# Patient Record
Sex: FEMALE | Race: WHITE | NOT HISPANIC OR LATINO | Employment: FULL TIME | URBAN - METROPOLITAN AREA
[De-identification: names, ages, dates, MRNs, and addresses within clinical notes are randomized per-mention and may not be internally consistent; named-entity substitution may affect disease eponyms.]

---

## 2017-01-23 ENCOUNTER — OFFICE VISIT (OUTPATIENT)
Dept: NEUROLOGY | Facility: CLINIC | Age: 42
End: 2017-01-23
Payer: COMMERCIAL

## 2017-01-23 VITALS — WEIGHT: 137.13 LBS | HEIGHT: 67 IN | BODY MASS INDEX: 21.52 KG/M2

## 2017-01-23 DIAGNOSIS — G43.109 MIGRAINE WITH AURA AND WITHOUT STATUS MIGRAINOSUS, NOT INTRACTABLE: Primary | ICD-10-CM

## 2017-01-23 PROCEDURE — 99999 PR PBB SHADOW E&M-EST. PATIENT-LVL II: CPT | Mod: PBBFAC,,, | Performed by: NEUROMUSCULOSKELETAL MEDICINE & OMM

## 2017-01-23 PROCEDURE — 99214 OFFICE O/P EST MOD 30 MIN: CPT | Mod: S$GLB,,, | Performed by: NEUROMUSCULOSKELETAL MEDICINE & OMM

## 2017-01-23 PROCEDURE — 1159F MED LIST DOCD IN RCRD: CPT | Mod: S$GLB,,, | Performed by: NEUROMUSCULOSKELETAL MEDICINE & OMM

## 2017-01-23 RX ORDER — SUMATRIPTAN SUCCINATE 100 MG/1
100 TABLET ORAL
Qty: 10 TABLET | Refills: 3 | Status: SHIPPED | OUTPATIENT
Start: 2017-01-23 | End: 2017-04-11 | Stop reason: SDUPTHER

## 2017-01-23 RX ORDER — AMITRIPTYLINE HYDROCHLORIDE 10 MG/1
10 TABLET, FILM COATED ORAL NIGHTLY PRN
Qty: 30 TABLET | Refills: 1 | Status: SHIPPED | OUTPATIENT
Start: 2017-01-23 | End: 2017-06-12 | Stop reason: SDUPTHER

## 2017-01-23 NOTE — PROGRESS NOTES
This history is dictated on [email or web address] word recognition program. There are word recognition mistakes that are occasionally missed on review.     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 She has 3 girls.     Present Medical History: Patient continues to have migraine headaches approximately one per month.  She typically takes 3-4 pills during a several day stretch of the headaches during her menstrual cycle.  Imitrex 100 mg works quite well.  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  She occasionally gets the aura but not often.  Alcohol or stress typically triggers her headaches as well as her menstrual cycle.  We discussed taking preventive medicine at this point however she does not wish to take it all month.    Previous note: 4-6-16: This is a 40-year-old white female who presents with a history of severe headache one month ago. She describes a 10/10 headache as a constant throbbing pain in the right frontal area associated with nausea and worse with sitting up. Headache was so bad he could not get up to take her kids to school. She denies any particular triggers for the headache however does note that if she has not slept properly for a couple days this seems to trigger a headache. She does occasionally have blurry vision lasting about 1 hour prior to the headache. This occurs 3-4 times per year the last of which was 3 months ago. Patient went to the ER one month ago with a bad headache. CT scan of the head was reportedly normal. Patient has normal periods but does not think the headaches are related. Patient has been taking birth control pills which she has been on for many years.  Gen. Appearance: Well-developed with no obvious deformities  Carotid arteries  symmetrical pulses  Peripheral vascular shows symmetrical pulses with no obvious edema or tenderness  Neurological Exam:  Mental status-alert and oriented to person, place, and time; attention span and concentration is good. Fund of knowledge-patient is aware of current events and able to give detailed history of the current problem.recent and remote memory seems intact. Language function is normal with no evidence of aphasia     Cranial nerves:Visual acuity to hand chart -normal; visual fields to confrontation normal;pupils were equal and reactive to light ; funduscopic examination was normal with sharp disc margins. external ocular movements were full with no nystagmus. Facial sensation to pinprick : normal ; corneal reflexes intact; Facial muscles were symmetrical. Hearing is unimpaired symmetrical finger rub; Tongue movements - normal ; palate movements - normal ;Swallowing unimpaired. Shoulder shrug was intact with good strength Speech was normal     Motor examination: Upper : normal Lower extremities - Normal and symmetrical;muscle tone was normal ; right-handed  Sensory examination:Upper; normal pinprick and soft touch ; lower extremities - normal and symmetrical. Vibration sense: normal for age :15-20 seconds @ toes  Deep tendon reflexes: upper extremities :1-2+ symmetrical ; lower extremities KJ- 1-2 +; AJ - 1-2+ Both plantar responses were flexor  Cerebellar examination upper: Normal finger to nose and rapid alternating movements  Gait: Steady with no ataxia; heel and toe walk normal  Romberg test: negative Tandem gait: Normal   Involuntary movements: Negative  Cervical examination: Full range of motion with no pain Cervical tenderness :negative  Lumbar examination: Low back tenderness-negative Sciatic notch tenderness-negative Straight leg raising test-negative     Impression:  hormonal migraines;  Recommendations/plan: 3-4 days of headaches patient is interested in preventive medicine for that period of  time.  We will start her on amitriptyline 10 mg at night to start 2 days prior to her menstrual cycle and take throughout the menstrual cycle to see if this will help prevent some of the headaches.  She will keep a headache diary.  Follow-up in 4 months

## 2017-04-11 ENCOUNTER — PATIENT MESSAGE (OUTPATIENT)
Dept: NEUROLOGY | Facility: CLINIC | Age: 42
End: 2017-04-11

## 2017-04-12 RX ORDER — SUMATRIPTAN SUCCINATE 100 MG/1
100 TABLET ORAL
Qty: 10 TABLET | Refills: 3 | Status: SHIPPED | OUTPATIENT
Start: 2017-04-12 | End: 2017-10-20 | Stop reason: ALTCHOICE

## 2017-06-12 ENCOUNTER — PATIENT MESSAGE (OUTPATIENT)
Dept: NEUROLOGY | Facility: CLINIC | Age: 42
End: 2017-06-12

## 2017-06-12 RX ORDER — AMITRIPTYLINE HYDROCHLORIDE 10 MG/1
10 TABLET, FILM COATED ORAL NIGHTLY PRN
Qty: 30 TABLET | Refills: 1 | Status: SHIPPED | OUTPATIENT
Start: 2017-06-12 | End: 2017-10-20 | Stop reason: SDUPTHER

## 2017-10-11 ENCOUNTER — PATIENT OUTREACH (OUTPATIENT)
Dept: INTERNAL MEDICINE | Facility: CLINIC | Age: 42
End: 2017-10-11

## 2017-10-11 NOTE — PROGRESS NOTES
[facility] is committed to your overall health.  To help you get the most out of each of your visits, we will review your information to make sure you are up to date on all of your recommended tests and/or procedures.       Your PCP  Tian Espinal MD   found that you may be due for:       Health Maintenance Due   Topic Date Due    TETANUS VACCINE  07/02/1994    Pap Smear with HPV Cotest  11/06/2016    Mammogram  12/09/2016    Influenza Vaccine  08/01/2017             If you have had any of the above done at another facility, please bring the records or information with you so that your record at [facility] will be complete.  If you would like to schedule any of these, please contact me.     If you are currently taking medication, please bring it with you to your appointment for review.     Also, if you have any type of Advanced Directives, please bring them with you to your office visit so we may scan them into your chart.       Thank you for choosing [facility] for your healthcare needs.

## 2017-10-20 ENCOUNTER — OFFICE VISIT (OUTPATIENT)
Dept: INTERNAL MEDICINE | Facility: CLINIC | Age: 42
End: 2017-10-20
Attending: INTERNAL MEDICINE
Payer: COMMERCIAL

## 2017-10-20 VITALS
SYSTOLIC BLOOD PRESSURE: 118 MMHG | BODY MASS INDEX: 21.97 KG/M2 | OXYGEN SATURATION: 97 % | HEIGHT: 67 IN | WEIGHT: 140 LBS | HEART RATE: 74 BPM | DIASTOLIC BLOOD PRESSURE: 80 MMHG

## 2017-10-20 DIAGNOSIS — F41.9 ANXIETY: ICD-10-CM

## 2017-10-20 DIAGNOSIS — G47.9 SLEEP DISORDER: ICD-10-CM

## 2017-10-20 DIAGNOSIS — Z00.00 ANNUAL PHYSICAL EXAM: Primary | ICD-10-CM

## 2017-10-20 DIAGNOSIS — Z12.4 CERVICAL CANCER SCREENING: ICD-10-CM

## 2017-10-20 PROCEDURE — 90686 IIV4 VACC NO PRSV 0.5 ML IM: CPT | Mod: S$GLB,,, | Performed by: INTERNAL MEDICINE

## 2017-10-20 PROCEDURE — 90471 IMMUNIZATION ADMIN: CPT | Mod: S$GLB,,, | Performed by: INTERNAL MEDICINE

## 2017-10-20 PROCEDURE — 99386 PREV VISIT NEW AGE 40-64: CPT | Mod: 25,S$GLB,, | Performed by: INTERNAL MEDICINE

## 2017-10-20 PROCEDURE — 99999 PR PBB SHADOW E&M-EST. PATIENT-LVL IV: CPT | Mod: PBBFAC,,, | Performed by: INTERNAL MEDICINE

## 2017-10-20 RX ORDER — SUMATRIPTAN SUCCINATE 100 MG/1
TABLET ORAL
COMMUNITY
Start: 2017-08-03 | End: 2017-11-28 | Stop reason: SDUPTHER

## 2017-10-20 RX ORDER — AMITRIPTYLINE HYDROCHLORIDE 25 MG/1
25 TABLET, FILM COATED ORAL NIGHTLY PRN
Qty: 30 TABLET | Refills: 1 | Status: SHIPPED | OUTPATIENT
Start: 2017-10-20 | End: 2017-12-22 | Stop reason: SDUPTHER

## 2017-10-20 NOTE — PROGRESS NOTES
"Patient was given vaccine information sheet for the Flu Vaccine. The area of injection was palpated using the acromion process as a landmark. This area was cleaned with alcohol. Using a 25g 1" safety needle, 0.5mL of the vaccine was placed into the left deltoid muscle. The injection site was dressed with a bandage. Patient experienced no complications and was discharged in stable condition. Fluzone vaccine Lot: [id number] Exp: [id number]    "

## 2017-10-20 NOTE — PROGRESS NOTES
Subjective:   Patient ID: Lu Lopez is a 42 y.o. female  Chief complaint:   Chief Complaint   Patient presents with    [facility]       HPI   Pt here to est care and for annual exam.   + feeling overwhelmed over past couple of months. + fatigue. In racing thoughts and worrying. Has 3 daughters - this time of year is typically tough for her due to anniversary of her 2nd daughter's dx of DM1. Eldest daughter is senior and she is applying for colleges. This is happy time but also emotional as oldest daughter is moving away to college.   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   + trouble falling asleep and staying asleep at times - does watch tv before bed.   Exercising 2-3 days per week   Works from home  Denies depression - mood is good, good support from   Was on lexapro after daughter initially dx with DM1 many years ago - pt stopped as did not like way it made her feel - felt flat  No fevers or chills, Cough or burning with uriantion    Gyn: Dr. Estes - mmg ordered through gyn at [facility] - due for pap next month    Has hx of migraines - followed by neuro - bianca elavil which she takes prior to cycle - 10mg not always helpful with sleep - bianca med well. Ha stable    Review of Systems   Constitutional: Negative for activity change, chills, fatigue and fever.   Eyes: Negative for discharge.   Respiratory: Negative for chest tightness, shortness of breath and wheezing.    Cardiovascular: Negative for chest pain and leg swelling.   Gastrointestinal: Negative for constipation, diarrhea and vomiting.   Genitourinary: Negative for difficulty urinating and hematuria.   Musculoskeletal: Negative for arthralgias and joint swelling.   Skin: Negative for color change and rash.   Neurological: Positive for headaches.   Psychiatric/Behavioral: Positive for decreased concentration and sleep disturbance. Negative for dysphoric mood, self-injury and suicidal ideas. The patient is  "nervous/anxious.      Objective:  Vitals:    10/20/17 1530   BP: 118/80   Pulse: 74   SpO2: 97%   Weight: 63.5 kg (139 lb 15.9 oz)   Height: 5' 7" (1.702 m)     Body mass index is 21.93 kg/m².    Physical Exam   Constitutional: She is oriented to person, place, and time. She appears well-developed and well-nourished.   HENT:   Head: Normocephalic and atraumatic.   Right Ear: External ear normal.   Left Ear: External ear normal.   Nose: Nose normal.   Mouth/Throat: Oropharynx is clear and moist. No oropharyngeal exudate.   No carotid bruits   Eyes: Conjunctivae and EOM are normal.   Neck: Neck supple. No thyromegaly present.   Cardiovascular: Normal rate, regular rhythm, normal heart sounds and intact distal pulses.    Pulmonary/Chest: Effort normal and breath sounds normal.   Abdominal: Soft. Bowel sounds are normal.   Musculoskeletal: She exhibits no edema or tenderness.   Lymphadenopathy:     She has no cervical adenopathy.   Neurological: She is alert and oriented to person, place, and time.   Skin: Skin is warm and dry. Capillary refill takes less than 2 seconds.   Psychiatric: Her behavior is normal. Thought content normal.   Vitals reviewed.    Assessment:  1. Annual physical exam    2. Cervical cancer screening    3. Sleep disorder    4. Anxiety        Plan:  Lu was seen today for establish care.    Diagnoses and all orders for this visit:    Annual physical exam  -     Influenza - Quadrivalent (3 years & older) (PF)  -     TSH; Future  -     Lipid panel; Future  -     CBC auto differential; Future  -     Comprehensive metabolic panel; Future  -     Hemoglobin A1c; Future  Recommend daily sunscreen, cardiovascular exercise min 30 min 5 days per week. Seatbelts routinely.    Sleep disorder: counseled on good sleep hygiene - otc meds discussed - trial of otc benadryl or elavil since bianca this. Will give higher dose of elavil 25mg to see if helpful for sleep as well as migraines (she is currently taking 10mg " near menstrual cycle for migraine prevention)    Anxiety: discussed rx, counseling, exercise - for now will try to improve sleep habits - see above. If no improvement she will consider trial of zoloft and let me know if decides to pursue this as well as counseling    Other orders  -     amitriptyline (ELAVIL) 25 MG tablet; Take 1 tablet (25 mg total) by mouth nightly as needed for Insomnia.      Health Maintenance   Topic Date Due    TETANUS VACCINE  07/02/1994    Pap Smear with HPV Cotest  11/06/2016    Mammogram  01/11/2019    Influenza Vaccine  Completed    Lipid Panel  Completed

## 2017-10-21 PROBLEM — F41.9 ANXIETY: Status: ACTIVE | Noted: 2017-10-21

## 2017-10-21 PROBLEM — G47.9 SLEEP DISORDER: Status: ACTIVE | Noted: 2017-10-21

## 2017-10-24 ENCOUNTER — LAB VISIT (OUTPATIENT)
Dept: LAB | Facility: OTHER | Age: 42
End: 2017-10-24
Attending: INTERNAL MEDICINE
Payer: COMMERCIAL

## 2017-10-24 DIAGNOSIS — Z00.00 ANNUAL PHYSICAL EXAM: ICD-10-CM

## 2017-10-24 LAB
ALBUMIN SERPL BCP-MCNC: 3.8 G/DL
ALP SERPL-CCNC: 35 U/L
ALT SERPL W/O P-5'-P-CCNC: 10 U/L
ANION GAP SERPL CALC-SCNC: 9 MMOL/L
AST SERPL-CCNC: 14 U/L
BASOPHILS # BLD AUTO: 0.01 K/UL
BASOPHILS NFR BLD: 0.2 %
BILIRUB SERPL-MCNC: 0.3 MG/DL
BUN SERPL-MCNC: 12 MG/DL
CALCIUM SERPL-MCNC: 9.1 MG/DL
CHLORIDE SERPL-SCNC: 105 MMOL/L
CHOLEST SERPL-MCNC: 183 MG/DL
CHOLEST/HDLC SERPL: 3.1 {RATIO}
CO2 SERPL-SCNC: 25 MMOL/L
CREAT SERPL-MCNC: 0.8 MG/DL
DIFFERENTIAL METHOD: NORMAL
EOSINOPHIL # BLD AUTO: 0.1 K/UL
EOSINOPHIL NFR BLD: 2 %
ERYTHROCYTE [DISTWIDTH] IN BLOOD BY AUTOMATED COUNT: 13.2 %
EST. GFR  (AFRICAN AMERICAN): >60 ML/MIN/1.73 M^2
EST. GFR  (NON AFRICAN AMERICAN): >60 ML/MIN/1.73 M^2
ESTIMATED AVG GLUCOSE: 97 MG/DL
GLUCOSE SERPL-MCNC: 91 MG/DL
HBA1C MFR BLD HPLC: 5 %
HCT VFR BLD AUTO: 37.5 %
HDLC SERPL-MCNC: 60 MG/DL
HDLC SERPL: 32.8 %
HGB BLD-MCNC: 12.3 G/DL
LDLC SERPL CALC-MCNC: 95.6 MG/DL
LYMPHOCYTES # BLD AUTO: 1.5 K/UL
LYMPHOCYTES NFR BLD: 31.8 %
MCH RBC QN AUTO: 27.9 PG
MCHC RBC AUTO-ENTMCNC: 32.8 G/DL
MCV RBC AUTO: 85 FL
MONOCYTES # BLD AUTO: 0.4 K/UL
MONOCYTES NFR BLD: 8.9 %
NEUTROPHILS # BLD AUTO: 2.6 K/UL
NEUTROPHILS NFR BLD: 56.9 %
NONHDLC SERPL-MCNC: 123 MG/DL
PLATELET # BLD AUTO: 287 K/UL
PMV BLD AUTO: 11.5 FL
POTASSIUM SERPL-SCNC: 3.8 MMOL/L
PROT SERPL-MCNC: 7.6 G/DL
RBC # BLD AUTO: 4.41 M/UL
SODIUM SERPL-SCNC: 139 MMOL/L
TRIGL SERPL-MCNC: 137 MG/DL
TSH SERPL DL<=0.005 MIU/L-ACNC: 1.43 UIU/ML
WBC # BLD AUTO: 4.59 K/UL

## 2017-10-24 PROCEDURE — 36415 COLL VENOUS BLD VENIPUNCTURE: CPT

## 2017-10-24 PROCEDURE — 80053 COMPREHEN METABOLIC PANEL: CPT

## 2017-10-24 PROCEDURE — 85025 COMPLETE CBC W/AUTO DIFF WBC: CPT

## 2017-10-24 PROCEDURE — 84443 ASSAY THYROID STIM HORMONE: CPT

## 2017-10-24 PROCEDURE — 83036 HEMOGLOBIN GLYCOSYLATED A1C: CPT

## 2017-10-24 PROCEDURE — 80061 LIPID PANEL: CPT

## 2017-11-08 ENCOUNTER — OFFICE VISIT (OUTPATIENT)
Dept: NEUROLOGY | Facility: CLINIC | Age: 42
End: 2017-11-08
Payer: COMMERCIAL

## 2017-11-08 VITALS
BODY MASS INDEX: 22.07 KG/M2 | DIASTOLIC BLOOD PRESSURE: 91 MMHG | WEIGHT: 140.63 LBS | HEIGHT: 67 IN | SYSTOLIC BLOOD PRESSURE: 138 MMHG | HEART RATE: 71 BPM

## 2017-11-08 DIAGNOSIS — G43.109 MIGRAINE WITH AURA AND WITHOUT STATUS MIGRAINOSUS, NOT INTRACTABLE: Primary | ICD-10-CM

## 2017-11-08 DIAGNOSIS — F41.9 ANXIETY: ICD-10-CM

## 2017-11-08 PROCEDURE — 99214 OFFICE O/P EST MOD 30 MIN: CPT | Mod: S$GLB,,, | Performed by: NEUROMUSCULOSKELETAL MEDICINE & OMM

## 2017-11-08 PROCEDURE — 99999 PR PBB SHADOW E&M-EST. PATIENT-LVL III: CPT | Mod: PBBFAC,,, | Performed by: NEUROMUSCULOSKELETAL MEDICINE & OMM

## 2017-11-10 NOTE — PROGRESS NOTES
My Last Outpatient Progress Note    Signed  Encounter Date: 1/23/2017 8:20 AM   This history is dictated on [employer] Speaking word recognition program. There are word recognition mistakes that are occasionally missed on review.    Social History : Patient works for [employer]; out of her house. She has 3 girls.  She has one girl with type 1 diabetes     Present Medical History: Patient presents for follow-up for her migraines.  She has been using to-3 Imitrex per month however in the last month she has had 5-6 headaches during her period.  She wakes up the next day with headaches which have lasted more than 24 hours.  She feels flushed and groggy with the headaches.  Imitrex typically works within one-2 hours.  She was recently increased to amitriptyline 25 mg at night which seems to be helping her sleep.  She has been under a lot of stress with a big  for the [employer] coming up which she is responsible for.    Previous note: 1-23-17: Patient continues to have migraine headaches approximately one per month.  She typically takes 3-4 pills during a several day stretch of the headaches during her menstrual cycle.  Imitrex 100 mg works quite well.  She did have a bad month in November where she was having 6-8 headaches under a lot of stress prior to Christmas with fund raising for her [email or web address] that she works for.  She occasionally gets the aura but not often.  Alcohol or stress typically triggers her headaches as well as her menstrual cycle.  We discussed taking preventive medicine at this point however she does not wish to take it all month.     Previous note: 4-6-16: This is a 40-year-old white female who presents with a history of severe headache one month ago. She describes a 10/10 headache as a constant throbbing pain in the right frontal area associated with nausea and worse with sitting up. Headache was so bad he could not get up to take her  kids to school. She denies any particular triggers for the headache however does note that if she has not slept properly for a couple days this seems to trigger a headache. She does occasionally have blurry vision lasting about 1 hour prior to the headache. This occurs 3-4 times per year the last of which was 3 months ago. Patient went to the ER one month ago with a bad headache. CT scan of the head was reportedly normal. Patient has normal periods but does not think the headaches are related. Patient has been taking birth control pills which she has been on for many years.  Gen. Appearance: Well-developed with no obvious deformities  Carotid arteries symmetrical pulses  Peripheral vascular shows symmetrical pulses with no obvious edema or tenderness  Neurological Exam:  Mental status-alert and oriented to person, place, and time; attention span and concentration is good. Fund of knowledge-patient is aware of current events and able to give detailed history of the current problem.recent and remote memory seems intact. Language function is normal with no evidence of aphasia     Cranial nerves:Visual acuity to hand chart -normal; visual fields to confrontation normal;pupils were equal and reactive to light ; funduscopic examination was normal with sharp disc margins. external ocular movements were full with no nystagmus. Facial sensation to pinprick : normal ; corneal reflexes intact; Facial muscles were symmetrical. Hearing is unimpaired symmetrical finger rub; Tongue movements - normal ; palate movements - normal ;Swallowing unimpaired. Shoulder shrug was intact with good strength Speech was normal     Motor examination: Upper : normal Lower extremities - Normal and symmetrical;muscle tone was normal ; right-handed  Sensory examination:Upper; normal pinprick and soft touch ; lower extremities - normal and symmetrical. Vibration sense: normal for age :15-20 seconds @ toes  Deep tendon reflexes: upper extremities  :1-2+ symmetrical ; lower extremities KJ- 1-2 +; AJ - 1-2+ Both plantar responses were flexor  Cerebellar examination upper: Normal finger to nose and rapid alternating movements  Gait: Steady with no ataxia; heel and toe walk normal  Romberg test: negative Tandem gait: Normal   Involuntary movements: Negative  Cervical examination: Full range of motion with no pain Cervical tenderness :negative  Lumbar examination: Low back tenderness-negative Sciatic notch tenderness-negative Straight leg raising test-negative     Impression:  hormonal migraines; anxiety disorder  Recommendations/plan: Continue amitriptyline 25 mg at bedtime; continue Imitrex when necessary for the migraines.   Follow-up in 6 months

## 2017-11-25 ENCOUNTER — PATIENT MESSAGE (OUTPATIENT)
Dept: NEUROLOGY | Facility: CLINIC | Age: 42
End: 2017-11-25

## 2017-11-27 ENCOUNTER — PATIENT MESSAGE (OUTPATIENT)
Dept: NEUROLOGY | Facility: CLINIC | Age: 42
End: 2017-11-27

## 2017-11-28 RX ORDER — SUMATRIPTAN SUCCINATE 100 MG/1
100 TABLET ORAL
Qty: 9 TABLET | Refills: 3 | Status: SHIPPED | OUTPATIENT
Start: 2017-11-28 | End: 2017-12-27 | Stop reason: SDUPTHER

## 2017-12-22 RX ORDER — AMITRIPTYLINE HYDROCHLORIDE 25 MG/1
TABLET, FILM COATED ORAL
Qty: 90 TABLET | Refills: 3 | Status: SHIPPED | OUTPATIENT
Start: 2017-12-22 | End: 2019-01-22 | Stop reason: SDUPTHER

## 2017-12-26 RX ORDER — ONDANSETRON 4 MG/1
TABLET, ORALLY DISINTEGRATING ORAL
Qty: 30 TABLET | Refills: 0 | Status: SHIPPED | OUTPATIENT
Start: 2017-12-26 | End: 2018-03-28 | Stop reason: SDUPTHER

## 2017-12-27 RX ORDER — SUMATRIPTAN SUCCINATE 100 MG/1
100 TABLET ORAL
Qty: 9 TABLET | Refills: 3 | Status: SHIPPED | OUTPATIENT
Start: 2017-12-27 | End: 2019-01-02 | Stop reason: SDUPTHER

## 2018-03-28 RX ORDER — ONDANSETRON 4 MG/1
TABLET, ORALLY DISINTEGRATING ORAL
Qty: 30 TABLET | Refills: 0 | Status: SHIPPED | OUTPATIENT
Start: 2018-03-28 | End: 2019-01-02 | Stop reason: SDUPTHER

## 2018-10-15 RX ORDER — SUMATRIPTAN SUCCINATE 100 MG/1
TABLET ORAL
Qty: 9 TABLET | Refills: 0 | Status: SHIPPED | OUTPATIENT
Start: 2018-10-15 | End: 2019-01-22 | Stop reason: SDUPTHER

## 2018-12-19 ENCOUNTER — PATIENT OUTREACH (OUTPATIENT)
Dept: ADMINISTRATIVE | Facility: HOSPITAL | Age: 43
End: 2018-12-19

## 2019-01-02 ENCOUNTER — OFFICE VISIT (OUTPATIENT)
Dept: INTERNAL MEDICINE | Facility: CLINIC | Age: 44
End: 2019-01-02
Attending: INTERNAL MEDICINE
Payer: COMMERCIAL

## 2019-01-02 ENCOUNTER — LAB VISIT (OUTPATIENT)
Dept: LAB | Facility: OTHER | Age: 44
End: 2019-01-02
Attending: INTERNAL MEDICINE
Payer: COMMERCIAL

## 2019-01-02 VITALS
HEIGHT: 67 IN | HEART RATE: 66 BPM | BODY MASS INDEX: 22.91 KG/M2 | OXYGEN SATURATION: 97 % | SYSTOLIC BLOOD PRESSURE: 120 MMHG | WEIGHT: 145.94 LBS | DIASTOLIC BLOOD PRESSURE: 82 MMHG

## 2019-01-02 DIAGNOSIS — Z00.00 ANNUAL PHYSICAL EXAM: Primary | ICD-10-CM

## 2019-01-02 DIAGNOSIS — G47.9 SLEEP DISORDER: ICD-10-CM

## 2019-01-02 DIAGNOSIS — G43.109 MIGRAINE WITH AURA AND WITHOUT STATUS MIGRAINOSUS, NOT INTRACTABLE: ICD-10-CM

## 2019-01-02 DIAGNOSIS — Z00.00 ANNUAL PHYSICAL EXAM: ICD-10-CM

## 2019-01-02 DIAGNOSIS — Z23 NEED FOR TDAP VACCINATION: ICD-10-CM

## 2019-01-02 DIAGNOSIS — Z23 NEED FOR INFLUENZA VACCINATION: ICD-10-CM

## 2019-01-02 LAB
25(OH)D3+25(OH)D2 SERPL-MCNC: 39 NG/ML
ALBUMIN SERPL BCP-MCNC: 4.1 G/DL
ALP SERPL-CCNC: 38 U/L
ALT SERPL W/O P-5'-P-CCNC: 11 U/L
ANION GAP SERPL CALC-SCNC: 8 MMOL/L
AST SERPL-CCNC: 16 U/L
BASOPHILS # BLD AUTO: 0.05 K/UL
BASOPHILS NFR BLD: 0.7 %
BILIRUB SERPL-MCNC: 0.4 MG/DL
BUN SERPL-MCNC: 16 MG/DL
CALCIUM SERPL-MCNC: 9.7 MG/DL
CHLORIDE SERPL-SCNC: 103 MMOL/L
CO2 SERPL-SCNC: 28 MMOL/L
CREAT SERPL-MCNC: 0.8 MG/DL
DIFFERENTIAL METHOD: NORMAL
EOSINOPHIL # BLD AUTO: 0.2 K/UL
EOSINOPHIL NFR BLD: 2.4 %
ERYTHROCYTE [DISTWIDTH] IN BLOOD BY AUTOMATED COUNT: 13.5 %
EST. GFR  (AFRICAN AMERICAN): >60 ML/MIN/1.73 M^2
EST. GFR  (NON AFRICAN AMERICAN): >60 ML/MIN/1.73 M^2
ESTIMATED AVG GLUCOSE: 105 MG/DL
GLUCOSE SERPL-MCNC: 95 MG/DL
HBA1C MFR BLD HPLC: 5.3 %
HCT VFR BLD AUTO: 38.7 %
HGB BLD-MCNC: 12.8 G/DL
LYMPHOCYTES # BLD AUTO: 1.8 K/UL
LYMPHOCYTES NFR BLD: 26.9 %
MCH RBC QN AUTO: 27.6 PG
MCHC RBC AUTO-ENTMCNC: 33.1 G/DL
MCV RBC AUTO: 84 FL
MONOCYTES # BLD AUTO: 0.5 K/UL
MONOCYTES NFR BLD: 7.8 %
NEUTROPHILS # BLD AUTO: 4.2 K/UL
NEUTROPHILS NFR BLD: 62.1 %
PLATELET # BLD AUTO: 342 K/UL
PMV BLD AUTO: 10.5 FL
POTASSIUM SERPL-SCNC: 3.7 MMOL/L
PROT SERPL-MCNC: 7.9 G/DL
RBC # BLD AUTO: 4.63 M/UL
SODIUM SERPL-SCNC: 139 MMOL/L
TSH SERPL DL<=0.005 MIU/L-ACNC: 1.2 UIU/ML
WBC # BLD AUTO: 6.8 K/UL

## 2019-01-02 PROCEDURE — 99214 PR OFFICE/OUTPT VISIT, EST, LEVL IV, 30-39 MIN: ICD-10-PCS | Mod: 25,S$GLB,, | Performed by: INTERNAL MEDICINE

## 2019-01-02 PROCEDURE — 90472 TDAP VACCINE GREATER THAN OR EQUAL TO 7YO IM: ICD-10-PCS | Mod: S$GLB,,, | Performed by: INTERNAL MEDICINE

## 2019-01-02 PROCEDURE — 90472 IMMUNIZATION ADMIN EACH ADD: CPT | Mod: S$GLB,,, | Performed by: INTERNAL MEDICINE

## 2019-01-02 PROCEDURE — 90715 TDAP VACCINE 7 YRS/> IM: CPT | Mod: S$GLB,,, | Performed by: INTERNAL MEDICINE

## 2019-01-02 PROCEDURE — 85025 COMPLETE CBC W/AUTO DIFF WBC: CPT

## 2019-01-02 PROCEDURE — 36415 COLL VENOUS BLD VENIPUNCTURE: CPT

## 2019-01-02 PROCEDURE — 90471 IMMUNIZATION ADMIN: CPT | Mod: S$GLB,,, | Performed by: INTERNAL MEDICINE

## 2019-01-02 PROCEDURE — 99999 PR PBB SHADOW E&M-EST. PATIENT-LVL IV: CPT | Mod: PBBFAC,,, | Performed by: INTERNAL MEDICINE

## 2019-01-02 PROCEDURE — 90686 IIV4 VACC NO PRSV 0.5 ML IM: CPT | Mod: S$GLB,,, | Performed by: INTERNAL MEDICINE

## 2019-01-02 PROCEDURE — 82306 VITAMIN D 25 HYDROXY: CPT

## 2019-01-02 PROCEDURE — 84443 ASSAY THYROID STIM HORMONE: CPT

## 2019-01-02 PROCEDURE — 83036 HEMOGLOBIN GLYCOSYLATED A1C: CPT

## 2019-01-02 PROCEDURE — 99214 OFFICE O/P EST MOD 30 MIN: CPT | Mod: 25,S$GLB,, | Performed by: INTERNAL MEDICINE

## 2019-01-02 PROCEDURE — 80053 COMPREHEN METABOLIC PANEL: CPT

## 2019-01-02 PROCEDURE — 90471 FLU VACCINE (QUAD) GREATER THAN OR EQUAL TO 3YO PRESERVATIVE FREE IM: ICD-10-PCS | Mod: S$GLB,,, | Performed by: INTERNAL MEDICINE

## 2019-01-02 PROCEDURE — 99999 PR PBB SHADOW E&M-EST. PATIENT-LVL IV: ICD-10-PCS | Mod: PBBFAC,,, | Performed by: INTERNAL MEDICINE

## 2019-01-02 PROCEDURE — 90715 TDAP VACCINE GREATER THAN OR EQUAL TO 7YO IM: ICD-10-PCS | Mod: S$GLB,,, | Performed by: INTERNAL MEDICINE

## 2019-01-02 PROCEDURE — 90686 FLU VACCINE (QUAD) GREATER THAN OR EQUAL TO 3YO PRESERVATIVE FREE IM: ICD-10-PCS | Mod: S$GLB,,, | Performed by: INTERNAL MEDICINE

## 2019-01-02 RX ORDER — ONDANSETRON 4 MG/1
TABLET, ORALLY DISINTEGRATING ORAL
Qty: 30 TABLET | Refills: 0 | Status: SHIPPED | OUTPATIENT
Start: 2019-01-02 | End: 2020-01-10 | Stop reason: SDUPTHER

## 2019-01-02 NOTE — PROGRESS NOTES
"Patient was given vaccine information sheet for the Tdap immunization. The area of injection was palpated using the acromion process as a landmark. This area was cleaned with alcohol. Using a 25g 1" safety needle, 0.5mL of the vaccine was placed into the left detoid muscle. The injection site was dressed with a bandage. Patient experienced no complications and was discharged in stable condition. Tdap Lot: [id number] Exp: [id number]      Patient was given vaccine information sheet for the Flu Vaccine. The area of injection was palpated using the acromion process as a landmark. This area was cleaned with alcohol. Using a 25g 1" safety needle, 0.5mL of the vaccine was placed into the right muscle. The injection site was dressed with a bandage. Patient experienced no complications and was discharged in stable condition. Fluzone vaccine Lot: [id number] Exp: [id number]      "

## 2019-01-02 NOTE — PROGRESS NOTES
"Subjective:   Patient ID: Lu Lopez is a 43 y.o. female  Chief complaint:   Chief Complaint   Patient presents with    Annual Exam       HPI   Here for annual exam     Migraines: stable - followed by neuro - taking 25mg elavil nightly and triptan prn   - taking zofran prn migraines    Stress at home -   Oldest child in college   - Sleep disorder: attrib to stress - 25mg of elavil is effective for this     Gyn: Dr. Estes - mmg ordered through gyn at [facility] license of [facility] for mmg in March    Exercising - last was 2 months    Review of Systems    Objective:  Vitals:    01/02/19 0756   BP: 120/82   BP Location: Left arm   Patient Position: Sitting   BP Method: Medium (Manual)   Pulse: 66   SpO2: 97%   Weight: 66.2 kg (145 lb 15.1 oz)   Height: 5' 7" (1.702 m)     Body mass index is 22.86 kg/m².    Physical Exam   Constitutional: She is oriented to person, place, and time. She appears well-developed and well-nourished.   HENT:   Head: Normocephalic and atraumatic.   Right Ear: External ear normal.   Left Ear: External ear normal.   Nose: Nose normal.   Mouth/Throat: Oropharynx is clear and moist. No oropharyngeal exudate.   No carotid bruits   Eyes: Conjunctivae and EOM are normal.   Neck: Neck supple. No thyromegaly present.   Cardiovascular: Normal rate, regular rhythm, normal heart sounds and intact distal pulses.   Pulmonary/Chest: Effort normal and breath sounds normal.   Abdominal: Soft. Bowel sounds are normal.   Musculoskeletal: She exhibits no edema or tenderness.   Lymphadenopathy:     She has no cervical adenopathy.   Neurological: She is alert and oriented to person, place, and time.   Skin: Skin is warm and dry.   Psychiatric: Her behavior is normal. Thought content normal.   Vitals reviewed.      Assessment:  1. Annual physical exam    2. Need for Tdap vaccination    3. Need for influenza vaccination    4. Migraine with aura and without status migrainosus, not intractable    5. Sleep disorder  "       Plan:  Lu was seen today for annual exam.    Diagnoses and all orders for this visit:    Annual physical exam  -     CBC auto differential; Future  -     Comprehensive metabolic panel; Future  -     TSH; Future  -     Vitamin D; Future  -     Hemoglobin A1c; Future  Recommend daily sunscreen, cardiovascular exercise min 30 min 5 days per week. Seatbelts routinely.    Need for Tdap vaccination  -     Tdap Vaccine; Future    Need for influenza vaccination  -     Influenza - Quadrivalent (3 years & older) (PF)    Migraines: current rx effective - HA stable   Prn med refill given today   Followed by neuro  -     ondansetron (ZOFRAN-ODT) 4 MG TbDL; DISSOLVE 2 TABLETS(8 MG) ON THE TONGUE TWICE DAILY AS NEEDED    Sleep disorder: attrib to stress - 25mg of elavil is effective for this - graded exercise program, good sleep hygiene     Health Maintenance   Topic Date Due    TETANUS VACCINE  07/02/1994    Influenza Vaccine  08/01/2018    Mammogram  03/21/2020    Pap Smear with HPV Cotest  11/15/2020    Lipid Panel  Completed

## 2019-01-17 RX ORDER — SUMATRIPTAN SUCCINATE 100 MG/1
TABLET ORAL
Qty: 9 TABLET | Refills: 0 | OUTPATIENT
Start: 2019-01-17

## 2019-01-22 ENCOUNTER — OFFICE VISIT (OUTPATIENT)
Dept: NEUROLOGY | Facility: CLINIC | Age: 44
End: 2019-01-22
Payer: COMMERCIAL

## 2019-01-22 VITALS
WEIGHT: 146.63 LBS | BODY MASS INDEX: 23.01 KG/M2 | DIASTOLIC BLOOD PRESSURE: 90 MMHG | SYSTOLIC BLOOD PRESSURE: 131 MMHG | HEIGHT: 67 IN | HEART RATE: 89 BPM

## 2019-01-22 DIAGNOSIS — G43.109 MIGRAINE WITH AURA AND WITHOUT STATUS MIGRAINOSUS, NOT INTRACTABLE: Primary | ICD-10-CM

## 2019-01-22 PROCEDURE — 99214 OFFICE O/P EST MOD 30 MIN: CPT | Mod: S$GLB,,, | Performed by: NEUROMUSCULOSKELETAL MEDICINE & OMM

## 2019-01-22 PROCEDURE — 99214 PR OFFICE/OUTPT VISIT, EST, LEVL IV, 30-39 MIN: ICD-10-PCS | Mod: S$GLB,,, | Performed by: NEUROMUSCULOSKELETAL MEDICINE & OMM

## 2019-01-22 PROCEDURE — 99999 PR PBB SHADOW E&M-EST. PATIENT-LVL III: CPT | Mod: PBBFAC,,, | Performed by: NEUROMUSCULOSKELETAL MEDICINE & OMM

## 2019-01-22 PROCEDURE — 99999 PR PBB SHADOW E&M-EST. PATIENT-LVL III: ICD-10-PCS | Mod: PBBFAC,,, | Performed by: NEUROMUSCULOSKELETAL MEDICINE & OMM

## 2019-01-22 RX ORDER — AMITRIPTYLINE HYDROCHLORIDE 25 MG/1
TABLET, FILM COATED ORAL
Qty: 90 TABLET | Refills: 3 | Status: SHIPPED | OUTPATIENT
Start: 2019-01-22 | End: 2020-01-28

## 2019-01-22 RX ORDER — SUMATRIPTAN SUCCINATE 100 MG/1
TABLET ORAL
Qty: 9 TABLET | Refills: 5 | Status: SHIPPED | OUTPATIENT
Start: 2019-01-22 | End: 2019-08-03 | Stop reason: SDUPTHER

## 2019-01-22 NOTE — PROGRESS NOTES
This history is dictated on [employer] word recognition program. There are word recognition mistakes that are occasionally missed on review.     Social History : Patient works for [employer]; out of her house. She has 3 girls.  She has one girl with type 1 diabetes     Present Medical History:  The patient presents for follow-up for migraine headaches.  She typically gets 2-3 headaches per month now.  Imitrex works 98% of the time within less than 2 hr.  She occasionally has to take a 2nd pill.  She has had rainbow alteration of her vision on several occasions throughout the year preceding the headache.    Previous patient:  11-8-17:   Patient presents for follow-up for her migraines.  She has been using 2-3 Imitrex per month however in the last month she has had 5-6 headaches during her period.  She wakes up the next day with headaches which have lasted more than 24 hours.  She feels flushed and groggy with the headaches.  Imitrex typically works within one-2 hours.  She was recently increased to amitriptyline 25 mg at night which seems to be helping her sleep.  She has been under a lot of stress with a big  for the diabetes [employer] coming up which she is responsible for.     Previous note: 1-23-17: Patient continues to have migraine headaches approximately one per month.  She typically takes 3-4 pills during a several day stretch of the headaches during her menstrual cycle.  Imitrex 100 mg works quite well.  She did have a bad month in November where she was having 6-8 headaches under a lot of stress prior to Christmas with fund raising for her [employer] that she works for.  She occasionally gets the aura but not often.  Alcohol or stress typically triggers her headaches as well as her menstrual cycle.  We discussed taking preventive medicine at this point however she does not wish to take it all month.       Gen. Appearance: Well-developed with  no obvious deformities  Carotid arteries symmetrical pulses  Peripheral vascular shows symmetrical pulses with no obvious edema or tenderness  Neurological Exam:  Mental status-alert and oriented to person, place, and time; attention span and concentration is good. Fund of knowledge-patient is aware of current events and able to give detailed history of the current problem.recent and remote memory seems intact. Language function is normal with no evidence of aphasia     Cranial nerves:Visual acuity to hand chart -normal; visual fields to confrontation normal;pupils were equal and reactive to light ; funduscopic examination was normal with sharp disc margins. external ocular movements were full with no nystagmus. Facial sensation to pinprick : normal ; corneal reflexes intact; Facial muscles were symmetrical. Hearing is unimpaired symmetrical finger rub; Tongue movements - normal ; palate movements - normal ;Swallowing unimpaired. Shoulder shrug was intact with good strength Speech was normal     Motor examination: Upper : normal Lower extremities - Normal and symmetrical;muscle tone was normal ; right-handed  Sensory examination:Upper; normal pinprick and soft touch ; lower extremities - normal and symmetrical. Vibration sense: normal for age :15-20 seconds @ toes  Deep tendon reflexes: upper extremities :1-2+ symmetrical ; lower extremities KJ- 1-2 +; AJ - 1-2+ Both plantar responses were flexor  Cerebellar examination upper: Normal finger to nose and rapid alternating movements  Gait: Steady with no ataxia; heel and toe walk normal  Romberg test: negative Tandem gait: Normal   Involuntary movements: Negative  Cervical examination: Full range of motion with no pain Cervical tenderness :negative  Lumbar examination: Low back tenderness-negative Sciatic notch tenderness-negative Straight leg raising test-negative     Impression:  hormonal migraines; anxiety disorder  Recommendations/plan:  we discussed the vision  alteration is probably an aura.  Patient can take the Imitrex at the time of the aura in attempt to prevent the headache from coming on completely.  Continue amitriptyline 25 mg at bedtime; continue Imitrex 100 mg she when necessary for the migraines.   Follow-up in 4months

## 2019-08-06 RX ORDER — SUMATRIPTAN SUCCINATE 100 MG/1
TABLET ORAL
Qty: 9 TABLET | Refills: 0 | Status: SHIPPED | OUTPATIENT
Start: 2019-08-06 | End: 2019-10-25 | Stop reason: SDUPTHER

## 2019-10-29 RX ORDER — SUMATRIPTAN SUCCINATE 100 MG/1
TABLET ORAL
Qty: 9 TABLET | Refills: 3 | Status: SHIPPED | OUTPATIENT
Start: 2019-10-29 | End: 2020-07-13

## 2020-01-06 ENCOUNTER — TELEPHONE (OUTPATIENT)
Dept: INTERNAL MEDICINE | Facility: CLINIC | Age: 45
End: 2020-01-06

## 2020-01-06 ENCOUNTER — PATIENT MESSAGE (OUTPATIENT)
Dept: INTERNAL MEDICINE | Facility: CLINIC | Age: 45
End: 2020-01-06

## 2020-01-07 ENCOUNTER — PATIENT MESSAGE (OUTPATIENT)
Dept: INTERNAL MEDICINE | Facility: CLINIC | Age: 45
End: 2020-01-07

## 2020-01-10 ENCOUNTER — PATIENT MESSAGE (OUTPATIENT)
Dept: INTERNAL MEDICINE | Facility: CLINIC | Age: 45
End: 2020-01-10

## 2020-01-10 RX ORDER — ONDANSETRON 4 MG/1
TABLET, ORALLY DISINTEGRATING ORAL
Qty: 30 TABLET | Refills: 0 | Status: SHIPPED | OUTPATIENT
Start: 2020-01-10 | End: 2020-03-13 | Stop reason: SDUPTHER

## 2020-01-10 RX ORDER — ACETAMINOPHEN AND CODEINE PHOSPHATE 120; 12 MG/5ML; MG/5ML
SOLUTION ORAL
COMMUNITY
Start: 2020-01-04

## 2020-01-14 ENCOUNTER — PATIENT MESSAGE (OUTPATIENT)
Dept: INTERNAL MEDICINE | Facility: CLINIC | Age: 45
End: 2020-01-14

## 2020-01-14 DIAGNOSIS — Z00.00 ANNUAL PHYSICAL EXAM: Primary | ICD-10-CM

## 2020-01-15 ENCOUNTER — PATIENT MESSAGE (OUTPATIENT)
Dept: ADMINISTRATIVE | Facility: OTHER | Age: 45
End: 2020-01-15

## 2020-01-15 ENCOUNTER — PATIENT MESSAGE (OUTPATIENT)
Dept: INTERNAL MEDICINE | Facility: CLINIC | Age: 45
End: 2020-01-15

## 2020-01-15 ENCOUNTER — OFFICE VISIT (OUTPATIENT)
Dept: INTERNAL MEDICINE | Facility: CLINIC | Age: 45
End: 2020-01-15
Attending: INTERNAL MEDICINE
Payer: COMMERCIAL

## 2020-01-15 ENCOUNTER — LAB VISIT (OUTPATIENT)
Dept: LAB | Facility: OTHER | Age: 45
End: 2020-01-15
Attending: INTERNAL MEDICINE
Payer: COMMERCIAL

## 2020-01-15 VITALS
OXYGEN SATURATION: 100 % | HEART RATE: 87 BPM | WEIGHT: 149.69 LBS | SYSTOLIC BLOOD PRESSURE: 124 MMHG | BODY MASS INDEX: 23.49 KG/M2 | DIASTOLIC BLOOD PRESSURE: 90 MMHG | HEIGHT: 67 IN

## 2020-01-15 DIAGNOSIS — R06.09 DOE (DYSPNEA ON EXERTION): ICD-10-CM

## 2020-01-15 DIAGNOSIS — R00.2 PALPITATIONS: ICD-10-CM

## 2020-01-15 DIAGNOSIS — G43.119 INTRACTABLE MIGRAINE WITH AURA WITHOUT STATUS MIGRAINOSUS: ICD-10-CM

## 2020-01-15 DIAGNOSIS — F41.9 ANXIETY: ICD-10-CM

## 2020-01-15 DIAGNOSIS — Z00.00 ANNUAL PHYSICAL EXAM: ICD-10-CM

## 2020-01-15 DIAGNOSIS — I10 HYPERTENSION, BENIGN: ICD-10-CM

## 2020-01-15 DIAGNOSIS — Z00.00 ANNUAL PHYSICAL EXAM: Primary | ICD-10-CM

## 2020-01-15 PROBLEM — G47.00 INSOMNIA: Status: ACTIVE | Noted: 2020-01-15

## 2020-01-15 PROBLEM — N80.9 ENDOMETRIOSIS: Status: ACTIVE | Noted: 2020-01-15

## 2020-01-15 PROBLEM — G43.109 MIGRAINE WITH AURA AND WITHOUT STATUS MIGRAINOSUS, NOT INTRACTABLE: Status: ACTIVE | Noted: 2019-12-08

## 2020-01-15 PROBLEM — R03.0 BORDERLINE HIGH BLOOD PRESSURE: Status: ACTIVE | Noted: 2019-12-08

## 2020-01-15 LAB
25(OH)D3+25(OH)D2 SERPL-MCNC: 30 NG/ML (ref 30–96)
ALBUMIN SERPL BCP-MCNC: 4.1 G/DL (ref 3.5–5.2)
ALP SERPL-CCNC: 48 U/L (ref 55–135)
ALT SERPL W/O P-5'-P-CCNC: 30 U/L (ref 10–44)
ANION GAP SERPL CALC-SCNC: 9 MMOL/L (ref 8–16)
AST SERPL-CCNC: 25 U/L (ref 10–40)
BASOPHILS # BLD AUTO: 0.03 K/UL (ref 0–0.2)
BASOPHILS NFR BLD: 0.5 % (ref 0–1.9)
BILIRUB SERPL-MCNC: 0.5 MG/DL (ref 0.1–1)
BUN SERPL-MCNC: 13 MG/DL (ref 6–20)
CALCIUM SERPL-MCNC: 9.3 MG/DL (ref 8.7–10.5)
CHLORIDE SERPL-SCNC: 107 MMOL/L (ref 95–110)
CHOLEST SERPL-MCNC: 174 MG/DL (ref 120–199)
CHOLEST/HDLC SERPL: 2.7 {RATIO} (ref 2–5)
CO2 SERPL-SCNC: 26 MMOL/L (ref 23–29)
CREAT SERPL-MCNC: 0.8 MG/DL (ref 0.5–1.4)
DIFFERENTIAL METHOD: ABNORMAL
EOSINOPHIL # BLD AUTO: 0.4 K/UL (ref 0–0.5)
EOSINOPHIL NFR BLD: 6 % (ref 0–8)
ERYTHROCYTE [DISTWIDTH] IN BLOOD BY AUTOMATED COUNT: 13.2 % (ref 11.5–14.5)
EST. GFR  (AFRICAN AMERICAN): >60 ML/MIN/1.73 M^2
EST. GFR  (NON AFRICAN AMERICAN): >60 ML/MIN/1.73 M^2
ESTIMATED AVG GLUCOSE: 105 MG/DL (ref 68–131)
GLUCOSE SERPL-MCNC: 85 MG/DL (ref 70–110)
HBA1C MFR BLD HPLC: 5.3 % (ref 4–5.6)
HCT VFR BLD AUTO: 39.1 % (ref 37–48.5)
HDLC SERPL-MCNC: 64 MG/DL (ref 40–75)
HDLC SERPL: 36.8 % (ref 20–50)
HGB BLD-MCNC: 12 G/DL (ref 12–16)
IMM GRANULOCYTES # BLD AUTO: 0.01 K/UL (ref 0–0.04)
IMM GRANULOCYTES NFR BLD AUTO: 0.2 % (ref 0–0.5)
LDLC SERPL CALC-MCNC: 94 MG/DL (ref 63–159)
LYMPHOCYTES # BLD AUTO: 2 K/UL (ref 1–4.8)
LYMPHOCYTES NFR BLD: 34.2 % (ref 18–48)
MCH RBC QN AUTO: 26.5 PG (ref 27–31)
MCHC RBC AUTO-ENTMCNC: 30.7 G/DL (ref 32–36)
MCV RBC AUTO: 87 FL (ref 82–98)
MONOCYTES # BLD AUTO: 0.7 K/UL (ref 0.3–1)
MONOCYTES NFR BLD: 11.2 % (ref 4–15)
NEUTROPHILS # BLD AUTO: 2.9 K/UL (ref 1.8–7.7)
NEUTROPHILS NFR BLD: 47.9 % (ref 38–73)
NONHDLC SERPL-MCNC: 110 MG/DL
NRBC BLD-RTO: 0 /100 WBC
PLATELET # BLD AUTO: 311 K/UL (ref 150–350)
PMV BLD AUTO: 10.7 FL (ref 9.2–12.9)
POTASSIUM SERPL-SCNC: 3.8 MMOL/L (ref 3.5–5.1)
PROT SERPL-MCNC: 7.3 G/DL (ref 6–8.4)
RBC # BLD AUTO: 4.52 M/UL (ref 4–5.4)
SODIUM SERPL-SCNC: 142 MMOL/L (ref 136–145)
TRIGL SERPL-MCNC: 80 MG/DL (ref 30–150)
TSH SERPL DL<=0.005 MIU/L-ACNC: 1.37 UIU/ML (ref 0.4–4)
WBC # BLD AUTO: 5.96 K/UL (ref 3.9–12.7)

## 2020-01-15 PROCEDURE — 99396 PREV VISIT EST AGE 40-64: CPT | Mod: S$GLB,,, | Performed by: INTERNAL MEDICINE

## 2020-01-15 PROCEDURE — 99999 PR PBB SHADOW E&M-EST. PATIENT-LVL IV: ICD-10-PCS | Mod: PBBFAC,,, | Performed by: INTERNAL MEDICINE

## 2020-01-15 PROCEDURE — 99396 PR PREVENTIVE VISIT,EST,40-64: ICD-10-PCS | Mod: S$GLB,,, | Performed by: INTERNAL MEDICINE

## 2020-01-15 PROCEDURE — 85025 COMPLETE CBC W/AUTO DIFF WBC: CPT

## 2020-01-15 PROCEDURE — 80053 COMPREHEN METABOLIC PANEL: CPT

## 2020-01-15 PROCEDURE — 83036 HEMOGLOBIN GLYCOSYLATED A1C: CPT

## 2020-01-15 PROCEDURE — 36415 COLL VENOUS BLD VENIPUNCTURE: CPT

## 2020-01-15 PROCEDURE — 82306 VITAMIN D 25 HYDROXY: CPT

## 2020-01-15 PROCEDURE — 84443 ASSAY THYROID STIM HORMONE: CPT

## 2020-01-15 PROCEDURE — 99999 PR PBB SHADOW E&M-EST. PATIENT-LVL IV: CPT | Mod: PBBFAC,,, | Performed by: INTERNAL MEDICINE

## 2020-01-15 PROCEDURE — 80061 LIPID PANEL: CPT

## 2020-01-15 RX ORDER — HYDROCHLOROTHIAZIDE 12.5 MG/1
12.5 TABLET ORAL DAILY
Qty: 30 TABLET | Refills: 1 | Status: SHIPPED | OUTPATIENT
Start: 2020-01-15 | End: 2020-02-19 | Stop reason: SDUPTHER

## 2020-01-15 NOTE — PATIENT INSTRUCTIONS
I recommend:  monitor bp closely at home and record bp log to bring to appt  only check blood pressure when you can rest for at least 5 minutes prior to checking bp  Sit up in comfortable chair with feet flat on ground and arm resting at side on table  Avoid checking bp at times when you are nervous, in pain, frustrated or excited    Do not talk or watch TV or check emails while resting prior to and when measuring bp

## 2020-01-15 NOTE — PROGRESS NOTES
"Subjective:   Patient ID: Lu Lopez is a 44 y.o. female  Chief complaint:   Chief Complaint   Patient presents with    Annual Exam       HPI    Here for annual exam     Exercising 3 times per week and more sob than she used to   - at times will feel like her heart is racing at times   - unsure if anxiety     Palpitations: noticed when resting over past few months  - no triggers - random at times - can last up to 30 min   - reports at one time hr was 115 when sitting and watching TV  - self resolving   - no lightheadedness or chest pain with this - reports 'feels off' when this occurs  - caffeine - 1 cup per day    More stress with daughter dx of anxiety and depression   - no panic attacks at this time      Switched ocps due to elevated bp 144/90  - no prior hx of HTN but bp elevated on mult occ upon chart review     Migraines:   - stable - followed by neuro - taking 25mg elavil nightly and triptan prn and effective as needed   - trigger is periods  - taking zofran prn migraines  - has appt with neuro in Feb      Stress at home -   Oldest child in college   - Sleep disorder: attrib to stress - 25mg of elavil is effective for this      Gyn: Dr. Estes - mmg ordered through gyn at [facility] - due for mmg in March     Exercising - last was 2 months     Due for flu vaccine    Review of Systems    Objective:  Vitals:    01/15/20 0800   BP: (!) 124/90   BP Location: Left arm   Patient Position: Sitting   BP Method: Medium (Manual)   Pulse: 87   SpO2: 100%   Weight: 67.9 kg (149 lb 11.1 oz)   Height: 5' 7" (1.702 m)     Body mass index is 23.45 kg/m².    Physical Exam   Constitutional: She is oriented to person, place, and time. She appears well-developed and well-nourished.   HENT:   Head: Normocephalic and atraumatic.   Right Ear: External ear normal.   Left Ear: External ear normal.   Nose: Nose normal.   Mouth/Throat: Oropharynx is clear and moist. No oropharyngeal exudate.   No carotid bruits   Eyes: Conjunctivae " and EOM are normal.   Neck: Neck supple. No thyromegaly present.   Cardiovascular: Normal rate, regular rhythm, normal heart sounds and intact distal pulses.   Pulmonary/Chest: Effort normal and breath sounds normal.   Abdominal: Soft. Bowel sounds are normal.   Musculoskeletal: She exhibits no edema or tenderness.   Lymphadenopathy:     She has no cervical adenopathy.   Neurological: She is alert and oriented to person, place, and time.   Skin: Skin is warm and dry.   Psychiatric: Her behavior is normal. Thought content normal.   Vitals reviewed.      Assessment:  1. Annual physical exam    2. CRAWFORD (dyspnea on exertion)    3. Palpitations    4. Hypertension, benign    5. Intractable migraine with aura without status migrainosus    6. Anxiety        Plan:  Lu was seen today for annual exam.    Diagnoses and all orders for this visit:    Annual physical exam  Labs pending from today    CRAWFORD (dyspnea on exertion)  -     Stress Echo Which stress agent will be used? Treadmill Exercise; Color Flow Doppler? No; Future  -     SCHEDULED EKG 12-LEAD (to Muse); Future    Palpitations  -     Holter monitor - 48 hour; Future  -     Ambulatory Referral to Electrophysiology    Hypertension, benign  -     hydroCHLOROthiazide (HYDRODIURIL) 12.5 MG Tab; Take 1 tablet (12.5 mg total) by mouth once daily.  -     Hypertension Digital Medicine (HDMP) Enrollment Order  -     Hypertension Digital Medicine (HDMP): Assign Onboarding Questionnaires    Start hctz  Dig htn prog   Low salt diet   rtc in 2 weeks for bp check     Intractable migraine with aura without status migrainosus  Cont med and f/u with neuro   Stable    Anxiety  Cont med     Er and rtc prompts given     Health Maintenance   Topic Date Due    Mammogram  03/21/2020    Pap Smear with HPV Cotest  11/15/2020    TETANUS VACCINE  01/02/2029    Lipid Panel  Completed

## 2020-01-16 ENCOUNTER — PATIENT MESSAGE (OUTPATIENT)
Dept: INTERNAL MEDICINE | Facility: CLINIC | Age: 45
End: 2020-01-16

## 2020-01-17 ENCOUNTER — PATIENT OUTREACH (OUTPATIENT)
Dept: OTHER | Facility: OTHER | Age: 45
End: 2020-01-17

## 2020-01-17 NOTE — PROGRESS NOTES
Digital Medicine: Health  Introduction    Introduced Lu Lopez to Digital Medicine. Discussed health  role and recommended lifestyle modifications.    The history is provided by the patient. No  was used.     HYPERTENSION  Our goal is to get BP to consistently below 130/80mmHg and make the process convenient so patient can avoid extra trips to the office. Getting your blood pressure below 130/80mmHg (definition of control) will reduce your risk for heart attack, kidney failure, stroke and death (as well as kidney failure, eye disease, & dementia)      Reviewed that the Digital Medicine care team - consisting of a clinician and a health  - will follow the most current evidence-based national guidelines for treating your condition.  The health  will focus on lifestyle modifications and motivation while the clinician will focus on medication therapy.  The care team will review all data on a regular basis and reach out as needed.      Explained that one of the key parts of the program is communication with the care team.  Asked patient to respond to outreach attempts and complete questionnaires.  Stressed importance of medication adherence.    Explained that we expect patient to obtain several blood pressures per week at random times of day.  Instructed patient not to allow anyone else to use phone and monitoring device.  Confirmed appropriate BP monitoring technique.      Explained to patient that the digital medicine team is not available for emergencies.  Patient will call [facility] on-call ([phone] or [phone]) or [phone] if needed.      Patient's BP goal is 130/80.Patient's BP average is 125/88 mmHg, which is above goal, per 2017 ACC/AHA Hypertension Guidelines.        Last 5 Patient Entered Readings                                      Current 30 Day Average: 125/88     Recent Readings 1/17/2020 1/16/2020 1/16/2020 1/15/2020 1/15/2020    SBP (mmHg) 120 118 133 138  132    DBP (mmHg) 83 83 84 91 89    Pulse 113 90 91 84 94        There are no preventive care reminders to display for this patient.    Reviewed the importance of self-monitoring, medication adherence, and that the health  can be used as a resource for lifestyle modifications to help reduce or maintain a healthy lifestyle.    Sent link to [facility]'s [employer] webpages and my contact information via [employer] for future questions. Follow up scheduled.

## 2020-01-17 NOTE — LETTER
January 17, 2020     Lu Lopez  [address] [facility] [place] [address]       Dear Lu,    Welcome to [facility]! Our goal is to make care effective, proactive and convenient by using data you send us from home to better treat your chronic conditions.              My name is Sherri Osman, and I am your dedicated Digital Medicine clinician. As an expert in medication management, I will help ensure that the medications you are taking continue to provide the intended benefits and help you reach your goals. You can reach me directly at [phone] or by sending me a message directly through your [facility] account.      I am Guicho Ann and I will be your health . My job is to help you identify lifestyle changes to improve your disease control. We will talk about nutrition, exercise, and other ways you may be able to adjust your current habits to better your health. Additionally, we will help ensure you are completing the tests and screenings that are necessary to help manage your conditions. You can reach me directly at [phone] or by sending me a message directly through your [facility] account.    Most importantly, YOU are at the center of this team. Together, we will work to improve your overall health and encourage you to meet your goals for a healthier lifestyle.     What we expect from YOU:  · Please take frequent home blood pressure measurements. We ask that you take at least 1 blood pressure reading per week, but more information will better help us get you know you. Be sure you rest for a few minutes before taking the reading in a quiet, comfortable place.     Be available to receive phone calls or invest[employer] messages, when appropriate, from your care team. Please let us know if there are any specific days or times that work best for us to reach you via phone.     Complete routine tests and screenings. Dont worry, we will help keep you on track!           What you should  expect from your Digital Medicine Care Team:   We will work with you to create a personalized plan of care and provide you with encouragement and education, including regarding lifestyle changes, that could help you manage your disease states.     We will adjust your current medications, if needed, and continue to monitor your long-term progress.     We will provide you and your physician with monthly progress reports after you have been in the program for more than 30 days.     We will send you reminders through [employer] and text messages to help ensure you do not miss any testing deadlines to help manage your disease states.    You will be able to reach us by phone or through your [employer] account by clicking our names under Care Team on the right side of the home screen.    I look forward to working with you to achieve your blood pressure goals!    We look forward to working with you to help manage your health,    Sincerely,    Your Digital Medicine Team    Please visit our websites to learn more:   · Hypertension: [email or web address]      Remember, we are not available for emergencies. If you have an emergency, please contact your doctors office directly or call [facility] on-call ([phone] or [phone]) or [phone].

## 2020-01-17 NOTE — LETTER
January 17, 2020     Lu Lopez  [address] [facility] [place] [address]       Dear Lu,    Welcome to [facility]! Our goal is to make care effective, proactive and convenient by using data you send us from home to better treat your chronic conditions.              My name is Sherri Osman, and I am your dedicated Digital Medicine clinician. As an expert in medication management, I will help ensure that the medications you are taking continue to provide the intended benefits and help you reach your goals. You can reach me directly at [phone] or by sending me a message directly through your [facility] account.      I am Guicho Ann and I will be your health . My job is to help you identify lifestyle changes to improve your disease control. We will talk about nutrition, exercise, and other ways you may be able to adjust your current habits to better your health. Additionally, we will help ensure you are completing the tests and screenings that are necessary to help manage your conditions. You can reach me directly at [phone] or by sending me a message directly through your [facility] account.    Most importantly, YOU are at the center of this team. Together, we will work to improve your overall health and encourage you to meet your goals for a healthier lifestyle.     What we expect from YOU:  · Please take frequent home blood pressure measurements. We ask that you take at least 1 blood pressure reading per week, but more information will better help us get you know you. Be sure you rest for a few minutes before taking the reading in a quiet, comfortable place.     Be available to receive phone calls or [employer] messages, when appropriate, from your care team. Please let us know if there are any specific days or times that work best for us to reach you via phone.     Complete routine tests and screenings. Dont worry, we will help keep you on track!           What you should  expect from your Digital Medicine Care Team:   We will work with you to create a personalized plan of care and provide you with encouragement and education, including regarding lifestyle changes, that could help you manage your disease states.     We will adjust your current medications, if needed, and continue to monitor your long-term progress.     We will provide you and your physician with monthly progress reports after you have been in the program for more than 30 days.     We will send you reminders through [employer] and text messages to help ensure you do not miss any testing deadlines to help manage your disease states.    You will be able to reach us by phone or through your [employer] account by clicking our names under Care Team on the right side of the home screen.    I look forward to working with you to achieve your blood pressure goals!    We look forward to working with you to help manage your health,    Sincerely,    Your Digital Medicine Team    Please visit our websites to learn more:   · Hypertension: [email or web address]      Remember, we are not available for emergencies. If you have an emergency, please contact your doctors office directly or call [facility] on-call ([phone] or [phone]) or [phone].

## 2020-01-21 ENCOUNTER — HOSPITAL ENCOUNTER (OUTPATIENT)
Dept: CARDIOLOGY | Facility: OTHER | Age: 45
Discharge: HOME | End: 2020-01-21
Attending: INTERNAL MEDICINE
Payer: COMMERCIAL

## 2020-01-21 ENCOUNTER — TELEPHONE (OUTPATIENT)
Dept: ELECTROPHYSIOLOGY | Facility: CLINIC | Age: 45
End: 2020-01-21

## 2020-01-21 ENCOUNTER — PATIENT MESSAGE (OUTPATIENT)
Dept: ELECTROPHYSIOLOGY | Facility: CLINIC | Age: 45
End: 2020-01-21

## 2020-01-21 VITALS — WEIGHT: 149 LBS | BODY MASS INDEX: 23.39 KG/M2 | HEIGHT: 67 IN

## 2020-01-21 DIAGNOSIS — R06.09 DOE (DYSPNEA ON EXERTION): ICD-10-CM

## 2020-01-21 DIAGNOSIS — I49.8 OTHER SPECIFIED CARDIAC ARRHYTHMIAS: Primary | ICD-10-CM

## 2020-01-21 LAB
BSA FOR ECHO PROCEDURE: 1.79 M2
CV ECHO LV RWT: 0.38 CM
CV STRESS BASE HR: 89 BPM
DIASTOLIC BLOOD PRESSURE: 90 MMHG
ECHO LV POSTERIOR WALL: 0.9 CM (ref 0.6–1.1)
FRACTIONAL SHORTENING: 36 % (ref 28–44)
INTERVENTRICULAR SEPTUM: 0.9 CM (ref 0.6–1.1)
LEFT INTERNAL DIMENSION IN SYSTOLE: 3 CM (ref 2.1–4)
LEFT VENTRICLE MASS INDEX: 80 G/M2
LEFT VENTRICULAR INTERNAL DIMENSION IN DIASTOLE: 4.7 CM (ref 3.5–6)
LEFT VENTRICULAR MASS: 142.71 G
OHS CV CPX 85 PERCENT MAX PREDICTED HEART RATE MALE: 143
OHS CV CPX ESTIMATED METS: 15
OHS CV CPX MAX PREDICTED HEART RATE: 168
OHS CV CPX PATIENT IS FEMALE: 1
OHS CV CPX PATIENT IS MALE: 0
OHS CV CPX PEAK DIASTOLIC BLOOD PRESSURE: 89 MMHG
OHS CV CPX PEAK HEAR RATE: 182 BPM
OHS CV CPX PEAK RATE PRESSURE PRODUCT: NORMAL
OHS CV CPX PEAK SYSTOLIC BLOOD PRESSURE: 224 MMHG
OHS CV CPX PERCENT MAX PREDICTED HEART RATE ACHIEVED: 108
OHS CV CPX RATE PRESSURE PRODUCT PRESENTING: NORMAL
STRESS ANGINA INDEX: 0
STRESS ECHO POST EXERCISE DUR MIN: 13 MINUTES
STRESS ECHO POST EXERCISE DUR SEC: 8 SECONDS
SYSTOLIC BLOOD PRESSURE: 132 MMHG

## 2020-01-21 PROCEDURE — 93351 STRESS TTE COMPLETE: CPT | Mod: 26,,, | Performed by: INTERNAL MEDICINE

## 2020-01-21 PROCEDURE — 93351 STRESS TTE COMPLETE: CPT

## 2020-01-21 PROCEDURE — 93010 ELECTROCARDIOGRAM REPORT: CPT | Mod: ,,, | Performed by: INTERNAL MEDICINE

## 2020-01-21 PROCEDURE — 93005 ELECTROCARDIOGRAM TRACING: CPT | Mod: 59

## 2020-01-21 PROCEDURE — 93010 EKG 12-LEAD: ICD-10-PCS | Mod: ,,, | Performed by: INTERNAL MEDICINE

## 2020-01-21 PROCEDURE — 93351 STRESS ECHO (CUPID ONLY): ICD-10-PCS | Mod: 26,,, | Performed by: INTERNAL MEDICINE

## 2020-01-23 ENCOUNTER — TELEPHONE (OUTPATIENT)
Dept: ELECTROPHYSIOLOGY | Facility: CLINIC | Age: 45
End: 2020-01-23

## 2020-01-23 NOTE — TELEPHONE ENCOUNTER
Voice message left for patient with appointment times for 1/28/2020 and instructions to call [phone] if she needs to reschedule.

## 2020-01-27 ENCOUNTER — PATIENT OUTREACH (OUTPATIENT)
Dept: ADMINISTRATIVE | Facility: OTHER | Age: 45
End: 2020-01-27

## 2020-01-28 ENCOUNTER — INITIAL CONSULT (OUTPATIENT)
Dept: ELECTROPHYSIOLOGY | Facility: CLINIC | Age: 45
End: 2020-01-28
Payer: COMMERCIAL

## 2020-01-28 ENCOUNTER — HOSPITAL ENCOUNTER (OUTPATIENT)
Dept: CARDIOLOGY | Facility: CLINIC | Age: 45
Discharge: HOME | End: 2020-01-28
Payer: COMMERCIAL

## 2020-01-28 VITALS
DIASTOLIC BLOOD PRESSURE: 64 MMHG | HEIGHT: 67 IN | BODY MASS INDEX: 23.49 KG/M2 | SYSTOLIC BLOOD PRESSURE: 108 MMHG | WEIGHT: 149.69 LBS | HEART RATE: 75 BPM

## 2020-01-28 DIAGNOSIS — I10 HYPERTENSION, BENIGN: ICD-10-CM

## 2020-01-28 DIAGNOSIS — I49.8 OTHER SPECIFIED CARDIAC ARRHYTHMIAS: ICD-10-CM

## 2020-01-28 DIAGNOSIS — R00.2 PALPITATIONS: Primary | ICD-10-CM

## 2020-01-28 DIAGNOSIS — R06.02 SHORTNESS OF BREATH ON EXERTION: ICD-10-CM

## 2020-01-28 PROCEDURE — 93005 RHYTHM STRIP: ICD-10-PCS | Mod: S$GLB,,, | Performed by: INTERNAL MEDICINE

## 2020-01-28 PROCEDURE — 99204 OFFICE O/P NEW MOD 45 MIN: CPT | Mod: S$GLB,,, | Performed by: INTERNAL MEDICINE

## 2020-01-28 PROCEDURE — 93010 ELECTROCARDIOGRAM REPORT: CPT | Mod: S$GLB,,, | Performed by: INTERNAL MEDICINE

## 2020-01-28 PROCEDURE — 99999 PR PBB SHADOW E&M-EST. PATIENT-LVL III: CPT | Mod: PBBFAC,,, | Performed by: INTERNAL MEDICINE

## 2020-01-28 PROCEDURE — 93005 ELECTROCARDIOGRAM TRACING: CPT | Mod: S$GLB,,, | Performed by: INTERNAL MEDICINE

## 2020-01-28 PROCEDURE — 93010 RHYTHM STRIP: ICD-10-PCS | Mod: S$GLB,,, | Performed by: INTERNAL MEDICINE

## 2020-01-28 PROCEDURE — 99204 PR OFFICE/OUTPT VISIT, NEW, LEVL IV, 45-59 MIN: ICD-10-PCS | Mod: S$GLB,,, | Performed by: INTERNAL MEDICINE

## 2020-01-28 PROCEDURE — 99999 PR PBB SHADOW E&M-EST. PATIENT-LVL III: ICD-10-PCS | Mod: PBBFAC,,, | Performed by: INTERNAL MEDICINE

## 2020-01-28 RX ORDER — AMITRIPTYLINE HYDROCHLORIDE 25 MG/1
TABLET, FILM COATED ORAL
Qty: 90 TABLET | Refills: 3 | Status: SHIPPED | OUTPATIENT
Start: 2020-01-28 | End: 2021-01-26 | Stop reason: SDUPTHER

## 2020-01-28 NOTE — PROGRESS NOTES
Subjective:    Patient ID:  Lu Lopez is a 44 y.o. female who presents for evaluation of Palpitations    Referring Physician: Shawnee Cisneros MD    HPI  I had the pleasure of seeing Mrs. Lopez today in our electrophysiology clinic in consultation for her palpitations. As you are aware she is a pleasant 44 year-old woman with hypertension and migraine headaches. She recently saw Dr. Cisneros for her annual evaluation. She reported over the past year. She noted that she was experiencing more shortness of breath with exercise than she usually does as well as feeling like her heart was racing. She reported as well having random periods of palpitations while at rest with elevated heart rate (can have a heart rate of 115 bpm at times) that can last up to 30 minutes then resolve. A holter monitor was ordered to evaluate this however has not been completed yet. An exercise stress echocardiogram recently noted a preserved LVEF with no ischemic changes with 15 METS of activity and a peak heart rate of 182 bpm. No arrhythmias were observed. She did have a hypertensive response to exercise. She does report having family stressors.    I reviewed all available ECGs including the stress ECG which are consistent with sinus rhythm. No preexcitation noted.    My interpretation of today's in clinic electrocardiogram is normal sinus rhythm with normal intervals.    Review of Systems   Constitution: Negative for fever and malaise/fatigue.   HENT: Negative for congestion and sore throat.    Eyes: Negative for blurred vision and visual disturbance.   Cardiovascular: Positive for dyspnea on exertion and palpitations. Negative for chest pain, irregular heartbeat, leg swelling, near-syncope, orthopnea, paroxysmal nocturnal dyspnea and syncope.   Respiratory: Negative for cough and shortness of breath.    Hematologic/Lymphatic: Negative for bleeding problem. Does not bruise/bleed easily.   Skin: Negative.    Musculoskeletal:  Negative.    Gastrointestinal: Negative for bloating and abdominal pain.   Neurological: Negative for excessive daytime sleepiness and dizziness.        Objective:    Physical Exam   Constitutional: She is oriented to person, place, and time. She appears well-developed and well-nourished. No distress.   HENT:   Head: Normocephalic and atraumatic.   Eyes: Conjunctivae are normal. Right eye exhibits no discharge. Left eye exhibits no discharge.   Neck: Neck supple. No JVD present.   Cardiovascular: Normal rate and regular rhythm. Exam reveals no gallop and no friction rub.   No murmur heard.  Pulmonary/Chest: Effort normal and breath sounds normal. No respiratory distress. She has no wheezes. She has no rales.   Abdominal: Soft. Bowel sounds are normal. She exhibits no distension. There is no tenderness. There is no rebound.   Musculoskeletal: She exhibits no edema.   Neurological: She is alert and oriented to person, place, and time.   Skin: Skin is warm and dry. She is not diaphoretic.   Psychiatric: She has a normal mood and affect. Her behavior is normal. Judgment and thought content normal.   Vitals reviewed.        Assessment:       1. Palpitations    2. Hypertension, benign    3. Shortness of breath on exertion         Plan:       In summary, Mrs. Lopez is a pleasant 44 year-old woman with hypertension presenting for evaluation of palpitations. She has a structurally normal heart and no arrhythmias observed during a treadmill stress test. Discussed the wide differential of symptoms of palpitations and need for a symptom rhythm correlate to reach a diagnosis and then discuss treatment indications and options. Will follow-up with patient after her holter monitor. Should it be normal and she not have her typical symptoms while wearing it then would recommend an extended monitor.    Regarding her shortness of breath with exercise, this may be related to her hypertensive response (BP was 224/89 at peak). She is  quite concerned regarding this. Will refer her to Dr. Chi Chen for further evaluation of this and recommendations.    Will call with results of the monitor and discuss next steps including follow-up visit scheduling if needed.    Thank you for allowing me to participate in the care of this patient. Please do not hesitate to call me with any questions or concerns.    Lei Broderick MD, PhD  Cardiac Electrophysiology

## 2020-01-28 NOTE — LETTER
January 28, 2020      Shawnee Cisneros MD  [address]  [facility] [address]           [facility] Arrhythmia  [address]  [facility] [address]  Phone: [phone]  Fax: [phone]          Patient: Lu Lopez   MR Number: [id number]   YOB: 1975   Date of Visit: 1/28/2020       Dear Dr. Shawnee Cisneros:    Thank you for referring Lu Lopez to me for evaluation. Attached you will find relevant portions of my assessment and plan of care.    If you have questions, please do not hesitate to call me. I look forward to following Lu Lopez along with you.    Sincerely,    Lei Broderick MD    Enclosure  CC:  No Recipients    If you would like to receive this communication electronically, please contact [email or web address] or [phone] to request more information on [employer] access.    For providers and/or their staff who would like to refer a patient to [facility], please contact us through our one-stop-shop provider referral line, [facility], at [phone].    If you feel you have received this communication in error or would no longer like to receive these types of communications, please e-mail [email or web address]

## 2020-01-31 ENCOUNTER — PATIENT OUTREACH (OUTPATIENT)
Dept: OTHER | Facility: OTHER | Age: 45
End: 2020-01-31

## 2020-01-31 NOTE — PROGRESS NOTES
Digital Medicine: Health  Follow-Up    The history is provided by the patient. No  was used.     Follow Up  Follow-up reason(s): reading review      Mrs. Lopez feels her high blood pressure is related more to stress and anxiety. She states she does work a high stress job. She uses meditation to help reduce stress. She states that sometimes she will forget to take her blood pressure and will hurry up and rush to do so and feels that she is not fully calm and relaxed before taking it. Recommended on the days that she forgets, but quickly remembers, to set an alarm to take it the following day so that she can ensure that she is fully relaxed before submitting a reading.     INTERVENTION(S)  encouragement/support    There are no preventive care reminders to display for this patient.    Last 5 Patient Entered Readings                                      Current 30 Day Average: 123/85     Recent Readings 1/30/2020 1/28/2020 1/27/2020 1/27/2020 1/26/2020    SBP (mmHg) 127 106 121 135 118    DBP (mmHg) 84 77 87 91 74    Pulse 96 97 86 84 96            Diet Screening   Breakfast is typically between. Ceral and milk .  Lunch is typically between. Salad or chicken salad with crackers .    Dinner is typically between. Red beans and rice, chicken noodle soup, burgers .    Patient reports eating or drinking the following: water and caffeine    Physical Activity Screening   When asked if exercising, patient responded: yes4 day(s) a week.  Her level of intensity when exercising is moderate.    Patient participates in the following activities: yoga/stretching and total body burn class     Mrs. Lopez states she also practices meditation and breathing techniques to help with stress. She states she does not always get to do this, but reports it does help. Encouraged to continue using these techniques during stressful times.     Medication Adherence Screening   She misses doses: never      Compliant with  current medication regimen. Mrs. Lopez has been on her medications for 2 weeks. She states that she will take it in the morning but sometimes will forget and will take it at 11AM vs 9AM. Suggested setting an alarm reminder or keeping a pill box close by, such as by the sink, so that when she brushes her teeth it will be visible to take. Suggested this for consistency of time every day.     Intervention(s): adherence tools

## 2020-02-04 ENCOUNTER — TELEPHONE (OUTPATIENT)
Dept: NEUROLOGY | Facility: CLINIC | Age: 45
End: 2020-02-04

## 2020-02-04 NOTE — TELEPHONE ENCOUNTER
----- Message from Oly Baires sent at 2/4/2020  6:41 AM CST -----  [facility]  Appointment Request From: Lu Lopez    With Provider: Abilio Valenzuela MD [[facility] - Neurology]    Preferred Date Range: 2/4/2020 - 2/14/2020    Preferred Times: Tuesday Morning, Thursday Morning    Reason for visit: Existing Patient    Comments:  Check in regarding my migraines

## 2020-02-05 ENCOUNTER — PATIENT OUTREACH (OUTPATIENT)
Dept: OTHER | Facility: OTHER | Age: 45
End: 2020-02-05

## 2020-02-05 DIAGNOSIS — I10 HYPERTENSION, BENIGN: ICD-10-CM

## 2020-02-05 NOTE — PROGRESS NOTES
Called to complete clinician introduction  Left voicemail requesting call back  BP improved with initiation of hctz ~ 1/15/20  Recommend repeat BMP  BP average 124/84 mmHg  Pertinent PMH includes: migraine, anxiety, htn, palpitations    Hypertension Medications             hydroCHLOROthiazide (HYDRODIURIL) 12.5 MG Tab Take 1 tablet (12.5 mg total) by mouth once daily.

## 2020-02-07 ENCOUNTER — PATIENT OUTREACH (OUTPATIENT)
Dept: ADMINISTRATIVE | Facility: OTHER | Age: 45
End: 2020-02-07

## 2020-02-11 ENCOUNTER — OFFICE VISIT (OUTPATIENT)
Dept: NEUROLOGY | Facility: CLINIC | Age: 45
End: 2020-02-11
Payer: COMMERCIAL

## 2020-02-11 VITALS
DIASTOLIC BLOOD PRESSURE: 78 MMHG | HEIGHT: 67 IN | BODY MASS INDEX: 23.67 KG/M2 | WEIGHT: 150.81 LBS | SYSTOLIC BLOOD PRESSURE: 119 MMHG | HEART RATE: 106 BPM

## 2020-02-11 DIAGNOSIS — G43.009 MIGRAINE WITHOUT AURA AND WITHOUT STATUS MIGRAINOSUS, NOT INTRACTABLE: Primary | ICD-10-CM

## 2020-02-11 PROCEDURE — 99213 OFFICE O/P EST LOW 20 MIN: CPT | Mod: S$GLB,,, | Performed by: NEUROMUSCULOSKELETAL MEDICINE & OMM

## 2020-02-11 PROCEDURE — 99999 PR PBB SHADOW E&M-EST. PATIENT-LVL III: CPT | Mod: PBBFAC,,, | Performed by: NEUROMUSCULOSKELETAL MEDICINE & OMM

## 2020-02-11 PROCEDURE — 99213 PR OFFICE/OUTPT VISIT, EST, LEVL III, 20-29 MIN: ICD-10-PCS | Mod: S$GLB,,, | Performed by: NEUROMUSCULOSKELETAL MEDICINE & OMM

## 2020-02-11 PROCEDURE — 99999 PR PBB SHADOW E&M-EST. PATIENT-LVL III: ICD-10-PCS | Mod: PBBFAC,,, | Performed by: NEUROMUSCULOSKELETAL MEDICINE & OMM

## 2020-02-11 NOTE — PROGRESS NOTES
Progress Notes   This history is dictated on [employer] word recognition program. There are word recognition mistakes that are occasionally missed on review.   Social History : Patient works for [employer]; out of her house. She has 3 girls. She has one girl with type 1 diabetes   Present Medical History:  Patient has been doing really well with her migraines.  Her birth control pills which change to a progesterone only with the elimination of estrogen and she has had no migraines in the last month.  She continues to take the amitriptyline 25 mg at night predominantly for sleep.  She was recently diagnosed with high blood pressure which she takes medicine.  Previous note:  1-22-19:  The patient presents for follow-up for migraine headaches. She typically gets 2-3 headaches per month now. Imitrex works 98% of the time within less than 2 hr. She occasionally has to take a 2nd pill. She has had rainbow alteration of her vision on several occasions throughout the year preceding the headache.   Previous patient: 11-8-17: Patient presents for follow-up for her migraines. She has been using 2-3 Imitrex per month however in the last month she has had 5-6 headaches during her period. She wakes up the next day with headaches which have lasted more than 24 hours. She feels flushed and groggy with the headaches. Imitrex typically works within one-2 hours. She was recently increased to amitriptyline 25 mg at night which seems to be helping her sleep. She has been under a lot of stress with a big  for the [employer] coming up which she is responsible for.   Previous note: 1-23-17: Patient continues to have migraine headaches approximately one per month. She typically takes 3-4 pills during a several day stretch of the headaches during her menstrual cycle. Imitrex 100 mg works quite well. 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 She occasionally gets the aura but not often. Alcohol or stress typically triggers her headaches as well as her menstrual cycle. We discussed taking preventive medicine at this point however she does not wish to take it all month.   Gen. Appearance: Well-developed with no obvious deformities   Carotid arteries symmetrical pulses   Peripheral vascular shows symmetrical pulses with no obvious edema or tenderness   Neurological Exam:   Mental status-alert and oriented to person, place, and time; attention span and concentration is good. Fund of knowledge-patient is aware of current events and able to give detailed history of the current problem.recent and remote memory seems intact. Language function is normal with no evidence of aphasia   Cranial nerves:Visual acuity to hand chart -normal; visual fields to confrontation normal;pupils were equal and reactive to light ; funduscopic examination was normal with sharp disc margins. external ocular movements were full with no nystagmus. Facial sensation to pinprick : normal ; corneal reflexes intact; Facial muscles were symmetrical. Hearing is unimpaired symmetrical finger rub; Tongue movements - normal ; palate movements - normal ;Swallowing unimpaired. Shoulder shrug was intact with good strength Speech was normal   Motor examination: Upper : normal Lower extremities - Normal and symmetrical;muscle tone was normal ; right-handed   Sensory examination:Upper; normal pinprick and soft touch ; lower extremities - normal and symmetrical. Vibration sense: normal for age :15-20 seconds @ toes   Deep tendon reflexes: upper extremities :1-2+ symmetrical ; lower extremities KJ- 1-2 +; AJ - 1-2+ Both plantar responses were flexor   Cerebellar examination upper: Normal finger to nose and rapid alternating movements   Gait: Steady with no ataxia; heel and toe walk normal   Romberg  test: negative Tandem gait: Normal   Involuntary movements: Negative   Cervical examination: Full range of motion with no pain Cervical tenderness :negative   Lumbar examination: Low back tenderness-negative Sciatic notch tenderness-negative Straight leg raising test-negative   Impression: hormonal migraines; anxiety disorder   Recommendations/plan:  Continue amitriptyline 25 mg at bedtime; continue Imitrex 100 mg when necessary for the migraines. Follow-up in  6 months

## 2020-02-14 ENCOUNTER — TELEPHONE (OUTPATIENT)
Dept: ELECTROPHYSIOLOGY | Facility: CLINIC | Age: 45
End: 2020-02-14

## 2020-02-19 RX ORDER — HYDROCHLOROTHIAZIDE 12.5 MG/1
12.5 TABLET ORAL DAILY
Qty: 30 TABLET | Refills: 5 | Status: SHIPPED | OUTPATIENT
Start: 2020-02-19 | End: 2020-09-03

## 2020-02-19 NOTE — PROGRESS NOTES
"Digital Medicine: Clinician Introduction    Lu Lopez is a 44 y.o. female who is newly enrolled in the Digital Medicine Clinic.    The following information was reviewed and updated:  Preferred pharmacy   [id number] - [place] - [address]  [address] [facility] [address]  [address] [place] [address]  Phone: [phone] Fax: [phone]      Patient prefers a 30 days supply.     Review of patient's allergies indicates:  No Known Allergies    Patient reports a "woozy" feeling a couple hours after taking hctz dose in the beginning, lightheaded when changing position. States feeling has decreased.    PMH includes: migraine, anxiety, htn, palpitations    The history is provided by the patient.     HYPERTENSION  Our goal is to get BP to consistently below 130/80mmHg and make the process convenient so patient can avoid extra trips to the office. Getting your blood pressure below 130/80mmHg (definition of control) will reduce your risk for heart attack, kidney failure, stroke and death (as well as kidney failure, eye disease, & dementia)      Reviewed non-pharmacologic therapies and impact on BP      Explained that we expect patient to obtain several blood pressures per week at random times of day.  Instructed patient not to allow anyone else to use phone and monitoring device.  Confirmed appropriate BP monitoring technique.      Explained to patient that the digital medicine team is not available for emergencies.  Patient will call [facility] on-call ([phone] or [phone]) or [phone] if needed.    Patient's BP goal is 130/80. Patients BP average is 120/79 mmHg, which is at or below goal, per 2017 ACC/AHA Hypertension Guidelines.      Med Review complete.    Allergies reviewed.      Last 5 Patient Entered Readings                                      Current 30 Day Average: 120/79     Recent Readings 2/18/2020 2/17/2020 2/16/2020 2/16/2020 2/14/2020    SBP (mmHg) 109 115 123 " 128 106    DBP (mmHg) 70 71 79 83 71    Pulse 87 87 71 88 89            INTERVENTION(S)  reviewed appropriate dose schedule, encouragement/support and denied questions    PLAN  patient verbalizes understanding and continue monitoring    Appears hypotensive/orthostatic symptoms have resolved as patient has been on hctz longer  Reviewed s/s of hypotension and advised her to call me if experiencing  Patient amenable to repeat BMP with appointment 3/5/20  Continue current regimen      There are no preventive care reminders to display for this patient.    Current Medication Regimen:  Hypertension Medications             hydroCHLOROthiazide (HYDRODIURIL) 12.5 MG Tab Take 1 tablet (12.5 mg total) by mouth once daily.            Reviewed the importance of self-monitoring, medication adherence, and that the health  can be used as a resource for lifestyle modifications to help reduce or maintain a healthy lifestyle.    Sent link to [facility] [employer] webpages and my contact information via [employer] for future questions. Follow up scheduled.                     Medication Adherence Screening     Patient knows purpose of medications.

## 2020-02-28 ENCOUNTER — PATIENT OUTREACH (OUTPATIENT)
Dept: OTHER | Facility: OTHER | Age: 45
End: 2020-02-28

## 2020-03-02 NOTE — PROGRESS NOTES
Subjective:   Patient ID:  Lu Lopez is a 44 y.o. female who presents for follow-up of HTN    HPI:  Patient is here for evaluation and treatment of hypertension ( borderline and HTN with exercise)-also some CRAWFORD and palpitations.  The patient has no chest pain, TIA,  syncope or pre-syncope.Patient does  exercise a fair amount.BPs average < 120/80 recently.        Review of Systems   Constitution: Negative for chills, decreased appetite, diaphoresis, fever, malaise/fatigue, night sweats, weight gain and weight loss.   HENT: Negative for congestion, hoarse voice, nosebleeds, sore throat and tinnitus.    Eyes: Negative for blurred vision, double vision, vision loss in left eye, vision loss in right eye, visual disturbance and visual halos.   Cardiovascular: Positive for dyspnea on exertion and palpitations. Negative for chest pain, claudication, cyanosis, irregular heartbeat, leg swelling, near-syncope, orthopnea, paroxysmal nocturnal dyspnea and syncope.   Respiratory: Positive for shortness of breath. Negative for cough, hemoptysis, sleep disturbances due to breathing, snoring, sputum production and wheezing.    Endocrine: Negative for cold intolerance, heat intolerance, polydipsia, polyphagia and polyuria.   Hematologic/Lymphatic: Negative for adenopathy and bleeding problem. Does not bruise/bleed easily.   Skin: Negative for color change, dry skin, flushing, itching, nail changes, poor wound healing, rash, skin cancer, suspicious lesions and unusual hair distribution.   Musculoskeletal: Negative for arthritis, back pain, falls, gout, joint pain, joint swelling, muscle cramps, muscle weakness, myalgias and stiffness.   Gastrointestinal: Negative for abdominal pain, anorexia, change in bowel habit, constipation, diarrhea, dysphagia, heartburn, hematemesis, hematochezia, melena and vomiting.   Genitourinary: Negative for decreased libido, dysuria, hematuria, hesitancy and urgency.   Neurological: Negative for  "excessive daytime sleepiness, dizziness, focal weakness, headaches, light-headedness, loss of balance, numbness, paresthesias, seizures, sensory change, tremors, vertigo and weakness.   Psychiatric/Behavioral: Negative for altered mental status, depression, hallucinations, memory loss, substance abuse and suicidal ideas. The patient does not have insomnia and is not nervous/anxious.    Allergic/Immunologic: Negative for environmental allergies and hives.       Objective: /80 (BP Location: Left arm, Patient Position: Sitting, BP Method: Medium (Manual))   Pulse 76   Ht 5' 7" (1.702 m)   Wt 68.1 kg (150 lb 2.1 oz)   LMP 02/11/2020   SpO2 100%   BMI 23.51 kg/m²      Physical Exam   Constitutional: She is oriented to person, place, and time. She appears well-developed and well-nourished.   HENT:   Head: Normocephalic.   Eyes: Pupils are equal, round, and reactive to light. EOM are normal.   Neck: Normal range of motion. Normal carotid pulses, no hepatojugular reflux and no JVD present. Carotid bruit is not present. No thyromegaly present.   Cardiovascular: Normal rate, regular rhythm, normal heart sounds and intact distal pulses. Exam reveals no gallop and no friction rub.   No murmur heard.  Pulmonary/Chest: Effort normal and breath sounds normal. No tachypnea. No respiratory distress. She has no wheezes. She has no rales. She exhibits no tenderness.   Abdominal: Soft. Bowel sounds are normal. She exhibits no distension and no mass. There is no tenderness. There is no rebound and no guarding.   Musculoskeletal: Normal range of motion. She exhibits no edema or tenderness.   Lymphadenopathy:     She has no cervical adenopathy.   Neurological: She is alert and oriented to person, place, and time. No cranial nerve deficit. Coordination normal.   Skin: Skin is warm. No rash noted. No erythema.   Psychiatric: She has a normal mood and affect. Her behavior is normal. Judgment and thought content normal. "       Assessment:     1. HTN (hypertension), benign    2. Palpitations    3. Shortness of breath on exertion    4. Sleep disorder        Plan:   Discussed diet , achieving and maintaining ideal body weight, and exercise.   We reviewed meds in detail.  Reassured-discussed goals, options, plan.  Discussed pros and cons of BP med and our guidelines.    Lu was seen today for hypertension and shortness of breath.    Diagnoses and all orders for this visit:    HTN (hypertension), benign    Palpitations    Shortness of breath on exertion    Sleep disorder            Follow up for Can follow prn.

## 2020-03-03 ENCOUNTER — PATIENT OUTREACH (OUTPATIENT)
Dept: ADMINISTRATIVE | Facility: OTHER | Age: 45
End: 2020-03-03

## 2020-03-04 ENCOUNTER — PATIENT MESSAGE (OUTPATIENT)
Dept: OTHER | Facility: OTHER | Age: 45
End: 2020-03-04

## 2020-03-05 ENCOUNTER — OFFICE VISIT (OUTPATIENT)
Dept: CARDIOLOGY | Facility: CLINIC | Age: 45
End: 2020-03-05
Payer: COMMERCIAL

## 2020-03-05 VITALS
HEART RATE: 76 BPM | HEIGHT: 67 IN | SYSTOLIC BLOOD PRESSURE: 118 MMHG | WEIGHT: 150.13 LBS | BODY MASS INDEX: 23.56 KG/M2 | DIASTOLIC BLOOD PRESSURE: 80 MMHG | OXYGEN SATURATION: 100 %

## 2020-03-05 DIAGNOSIS — R00.2 PALPITATIONS: ICD-10-CM

## 2020-03-05 DIAGNOSIS — G47.9 SLEEP DISORDER: ICD-10-CM

## 2020-03-05 DIAGNOSIS — R06.02 SHORTNESS OF BREATH ON EXERTION: ICD-10-CM

## 2020-03-05 DIAGNOSIS — I10 HTN (HYPERTENSION), BENIGN: Primary | ICD-10-CM

## 2020-03-05 PROCEDURE — 99999 PR PBB SHADOW E&M-EST. PATIENT-LVL III: CPT | Mod: PBBFAC,,, | Performed by: INTERNAL MEDICINE

## 2020-03-05 PROCEDURE — 99203 PR OFFICE/OUTPT VISIT, NEW, LEVL III, 30-44 MIN: ICD-10-PCS | Mod: S$GLB,,, | Performed by: INTERNAL MEDICINE

## 2020-03-05 PROCEDURE — 99203 OFFICE O/P NEW LOW 30 MIN: CPT | Mod: S$GLB,,, | Performed by: INTERNAL MEDICINE

## 2020-03-05 PROCEDURE — 99999 PR PBB SHADOW E&M-EST. PATIENT-LVL III: ICD-10-PCS | Mod: PBBFAC,,, | Performed by: INTERNAL MEDICINE

## 2020-03-05 NOTE — LETTER
March 5, 2020      Lei Broderick MD  [address] [facility] [address]           [facility]  [address]  [place] [address]  Phone: [phone]          Patient: Lu Lopez   MR Number: [id number]   YOB: 1975   Date of Visit: 3/5/2020       Dear Dr. Lei Broderick:    Thank you for referring Lu Lopez to me for evaluation. Attached you will find relevant portions of my assessment and plan of care.    If you have questions, please do not hesitate to call me. I look forward to following Lu Lopez along with you.    Sincerely,    Dion Chen MD    Enclosure  CC:  No Recipients    If you would like to receive this communication electronically, please contact [email or web address] or [phone] to request more information on [employer] access.    For providers and/or their staff who would like to refer a patient to [facility], please contact us through our one-stop-shop provider referral line, [facility], at [phone].    If you feel you have received this communication in error or would no longer like to receive these types of communications, please e-mail [email or web address]

## 2020-03-05 NOTE — PATIENT INSTRUCTIONS
Discussed diet , achieving and maintaining ideal body weight, and exercise.   We reviewed meds in detail.  Reassured-discussed goals, options, plan.  Discussed pros and cons of BP med and our guidelines.

## 2020-03-06 LAB
BUN SERPL-MCNC: 15 MG/DL (ref 7–25)
BUN/CREAT SERPL: NORMAL (CALC) (ref 6–22)
CALCIUM SERPL-MCNC: 9.5 MG/DL (ref 8.6–10.2)
CHLORIDE SERPL-SCNC: 103 MMOL/L (ref 98–110)
CO2 SERPL-SCNC: 27 MMOL/L (ref 20–32)
CREAT SERPL-MCNC: 0.67 MG/DL (ref 0.5–1.1)
GFRSERPLBLD MDRD-ARVRAT: 108 ML/MIN/1.73M2
GLUCOSE SERPL-MCNC: 87 MG/DL (ref 65–99)
POTASSIUM SERPL-SCNC: 3.9 MMOL/L (ref 3.5–5.3)
SODIUM SERPL-SCNC: 139 MMOL/L (ref 135–146)

## 2020-03-13 RX ORDER — ONDANSETRON 4 MG/1
TABLET, ORALLY DISINTEGRATING ORAL
Qty: 30 TABLET | Refills: 0 | Status: SHIPPED | OUTPATIENT
Start: 2020-03-13 | End: 2021-02-26 | Stop reason: SDUPTHER

## 2020-04-15 NOTE — PROGRESS NOTES
Digital Medicine: Health  Follow-Up    The history is provided by the patient. No  was used.     Follow Up  Follow-up reason(s): reading review      Readings are trending down due to lifestyle change.    Mrs. Lopez and her  are currently working from home. She states that she feels her downward trend in her blood pressure is because she is handling stress better, increasing activity, and is around her family.     INTERVENTION(S)  encouragement/support    There are no preventive care reminders to display for this patient.    Last 5 Patient Entered Readings                                      Current 30 Day Average: 119/77     Recent Readings 4/14/2020 4/13/2020 4/7/2020 4/5/2020 4/1/2020    SBP (mmHg) 119 130 120 128 109    DBP (mmHg) 74 91 72 85 74    Pulse 98 100 99 72 95            Physical Activity Screening   When asked if exercising, patient responded: yes7 day(s) a week.  Her level of intensity when exercising is high.    Patient participates in the following activities: group classes and walking    Mrs. Lopez participates in her group class virtually 3 times a week and states her and her  will go on a walk every day. Praise provided for increase in physical activity.     Medication Adherence Screening   She did not miss a dose this month.    No questions or concerns at this time.

## 2020-05-25 ENCOUNTER — PATIENT OUTREACH (OUTPATIENT)
Dept: OTHER | Facility: OTHER | Age: 45
End: 2020-05-25

## 2020-05-25 NOTE — PROGRESS NOTES
Left voicemail and sent [facility] message to follow up with Ms. Lu Lopez.    Per 30 day average, blood pressure is right at goal 121/81 mmHg. Encouraged adherence to low sodium diet and physical activity guidelines. Advised patient to call or message with questions or concerns.     If initial reading is elevated, patient repeats reading and it is at or near goal    Hypertension Medications             hydroCHLOROthiazide (HYDRODIURIL) 12.5 MG Tab Take 1 tablet (12.5 mg total) by mouth once daily.

## 2020-05-27 ENCOUNTER — PATIENT OUTREACH (OUTPATIENT)
Dept: OTHER | Facility: OTHER | Age: 45
End: 2020-05-27

## 2020-06-24 NOTE — PROGRESS NOTES
Digital Medicine: Health  Follow-Up    The history is provided by the patient. No  was used.   Follow Up  Follow-up reason(s): reading review    Readings are consistent since last encounter.   Feels meditation should help BP more because it used to help. Recommended charging monitor once a month to help with accurate readings. Practices correct technique.     INTERVENTION(S)  encouragement/support    There are no preventive care reminders to display for this patient.    Last 5 Patient Entered Readings                                      Current 30 Day Average: 123/83     Recent Readings 6/21/2020 6/16/2020 6/10/2020 6/9/2020 6/8/2020    SBP (mmHg) 128 121 129 119 123    DBP (mmHg) 85 82 87 82 83    Pulse 88 76 87 83 81            Physical Activity Screening   No change to exercise routine.    When asked if exercising, patient responded: yes    Praise provided for maintaining activity. Patient answered phone while working out.    Medication Adherence Screening   She did not miss a dose this month.    No questions or concerns at this time.

## 2020-07-07 ENCOUNTER — LAB VISIT (OUTPATIENT)
Dept: LAB | Facility: OTHER | Age: 45
End: 2020-07-07
Attending: INTERNAL MEDICINE
Payer: COMMERCIAL

## 2020-07-07 ENCOUNTER — OFFICE VISIT (OUTPATIENT)
Dept: INTERNAL MEDICINE | Facility: CLINIC | Age: 45
End: 2020-07-07
Attending: INTERNAL MEDICINE
Payer: COMMERCIAL

## 2020-07-07 VITALS
BODY MASS INDEX: 24.24 KG/M2 | OXYGEN SATURATION: 99 % | HEART RATE: 97 BPM | SYSTOLIC BLOOD PRESSURE: 122 MMHG | DIASTOLIC BLOOD PRESSURE: 92 MMHG | WEIGHT: 154.75 LBS

## 2020-07-07 DIAGNOSIS — R63.5 WEIGHT GAIN: ICD-10-CM

## 2020-07-07 DIAGNOSIS — R14.0 BLOATING: ICD-10-CM

## 2020-07-07 DIAGNOSIS — I10 HTN (HYPERTENSION), BENIGN: Primary | ICD-10-CM

## 2020-07-07 LAB
IGA SERPL-MCNC: 135 MG/DL (ref 40–350)
TSH SERPL DL<=0.005 MIU/L-ACNC: 0.83 UIU/ML (ref 0.4–4)

## 2020-07-07 PROCEDURE — 83516 IMMUNOASSAY NONANTIBODY: CPT

## 2020-07-07 PROCEDURE — 99999 PR PBB SHADOW E&M-EST. PATIENT-LVL III: ICD-10-PCS | Mod: PBBFAC,,, | Performed by: INTERNAL MEDICINE

## 2020-07-07 PROCEDURE — 82784 ASSAY IGA/IGD/IGG/IGM EACH: CPT

## 2020-07-07 PROCEDURE — 36415 COLL VENOUS BLD VENIPUNCTURE: CPT

## 2020-07-07 PROCEDURE — 99214 OFFICE O/P EST MOD 30 MIN: CPT | Mod: S$GLB,,, | Performed by: INTERNAL MEDICINE

## 2020-07-07 PROCEDURE — 84443 ASSAY THYROID STIM HORMONE: CPT

## 2020-07-07 PROCEDURE — 99214 PR OFFICE/OUTPT VISIT, EST, LEVL IV, 30-39 MIN: ICD-10-PCS | Mod: S$GLB,,, | Performed by: INTERNAL MEDICINE

## 2020-07-07 PROCEDURE — 99999 PR PBB SHADOW E&M-EST. PATIENT-LVL III: CPT | Mod: PBBFAC,,, | Performed by: INTERNAL MEDICINE

## 2020-07-07 NOTE — PROGRESS NOTES
Subjective:   Patient ID: Lu Lopez is a 45 y.o. female  Chief complaint:   Chief Complaint   Patient presents with    Hypertension     f/u    Bloated       HPI  Here for f/u of HTN     Seen by cards     HTN: argued with teenager just before visit and bp elevated   Not checking bp reg but when checks it it at goal   - furloughed from job - inc stress  - bp at goal on 7/2/2020 per dig htn reading    Walking 2mi per day, exercising reg     Reports bloating at times   - felt bloated after eating ritz crackers and worried about gluten     Vit d def:  Not c/w otc vit d suppl    mmg ordered throught gyn at [facility]   Flu vaccine in fall     Review of Systems    Objective:  Vitals:    07/07/20 1016   BP: (!) 122/92   BP Location: Left arm   Patient Position: Sitting   Pulse: 97   SpO2: 99%   Weight: 70.2 kg (154 lb 12.2 oz)     Body mass index is 24.24 kg/m².    Physical Exam  Vitals signs reviewed.   Constitutional:       Appearance: Normal appearance. She is well-developed.   HENT:      Head: Normocephalic and atraumatic.      Right Ear: Tympanic membrane, ear canal and external ear normal.      Left Ear: Tympanic membrane, ear canal and external ear normal.      Nose: Nose normal. No congestion.      Mouth/Throat:      Mouth: Mucous membranes are moist.      Pharynx: Oropharynx is clear. No oropharyngeal exudate.   Eyes:      Extraocular Movements: Extraocular movements intact.      Conjunctiva/sclera: Conjunctivae normal.   Neck:      Musculoskeletal: Neck supple.      Thyroid: No thyromegaly.   Cardiovascular:      Rate and Rhythm: Normal rate and regular rhythm.      Pulses: Normal pulses.      Heart sounds: Normal heart sounds.   Pulmonary:      Effort: Pulmonary effort is normal.      Breath sounds: Normal breath sounds.   Abdominal:      General: Bowel sounds are normal.      Palpations: Abdomen is soft.   Musculoskeletal:         General: No swelling or tenderness.   Lymphadenopathy:      Cervical: No  cervical adenopathy.   Skin:     General: Skin is warm and dry.      Capillary Refill: Capillary refill takes less than 2 seconds.   Neurological:      General: No focal deficit present.      Mental Status: She is alert and oriented to person, place, and time.   Psychiatric:         Behavior: Behavior normal.         Thought Content: Thought content normal.         Assessment:  1. Bloating    2. Weight gain    3. HTN (hypertension), benign        Plan:  Lu was seen today for hypertension and bloated.    Diagnoses and all orders for this visit:    Bloating  -     Tissue transglutaminase, IgA; Future  -     IgA; Future  Avoid food triggers - dairy   - check labs  Probiotic   Er and rtc prompts given     Weight gain  -     TSH; Future  - cont diet and exercise  - increase intensity and duration of CV exercise to continue weight loss  - goal wt loss one pound per week  - portion control, healthy choices    HTN (hypertension), benign  Cont with dig htn prog  bp at goal on prior bp reading  Cont meds  Low salt diet    Health Maintenance   Topic Date Due    Mammogram  06/16/2022    TETANUS VACCINE  01/02/2029    Lipid Panel  Completed

## 2020-07-10 LAB — TTG IGA SER-ACNC: 3 UNITS

## 2020-08-12 ENCOUNTER — PATIENT OUTREACH (OUTPATIENT)
Dept: OTHER | Facility: OTHER | Age: 45
End: 2020-08-12

## 2020-09-02 DIAGNOSIS — I10 HYPERTENSION, BENIGN: ICD-10-CM

## 2020-09-02 NOTE — PROGRESS NOTES
BP at goal, average 117/77 mmHg  Continue current regimen and monitoring  Health  is attempting contact

## 2020-09-02 NOTE — TELEPHONE ENCOUNTER
Care Due:                  Date            Visit Type   Department     Provider  --------------------------------------------------------------------------------                                ESTABLISHED   [facility] Internal  Last Visit: 07-      PATIENT      Med            Shawnee Cisnreos  Next Visit: None Scheduled  None         None Found                                                            Last  Test          Frequency    Reason                     Performed    Due Date  --------------------------------------------------------------------------------    Cr..........  6 months...  hydroCHLOROthiazide......  03- 09-    K...........  6 months...  hydroCHLOROthiazide......  03- 09-    Na..........  6 months...  hydroCHLOROthiazide......  03- 09-    Powered by [employer]. Reference number: [id number]. 9/02/2020 3:24:03 AM CDT

## 2020-09-03 RX ORDER — HYDROCHLOROTHIAZIDE 12.5 MG/1
TABLET ORAL
Qty: 90 TABLET | Refills: 0 | Status: SHIPPED | OUTPATIENT
Start: 2020-09-03 | End: 2020-12-02 | Stop reason: SDUPTHER

## 2020-09-03 NOTE — TELEPHONE ENCOUNTER
Provider Staff:     Please schedule patient for Nurse BP Check and Labs (SCr, Na, K)    Please also check with your provider if any further labs need to be added and scheduled together.    Thanks!  [facility]     Appointments  past 12m or future 3m with PCP    Date Provider   Last Visit   7/7/2020 Shawnee Cisneros MD   Next Visit   Visit date not found Shawnee Cisneros MD     Note composed:1:41 PM 09/03/2020

## 2020-09-03 NOTE — PROGRESS NOTES
Refill Routing Note   Medication(s) are not appropriate for processing by [facility]:       - Required vitals are abnormal     Will follow up with your staff to schedule appointment and labs after your decision.    Medication-related problems identified:   Requires labs  Requires appointment  Medication Therapy Plan: DREW NTBO(SCr, Na, K); BP-ELEVATED(122/92) AT LOV NOT TO ORC STANDARDS ABNORMAL VITALS, PLEASE ADVISE; NEEDS APPT(BP CHECK), DEFER TO YOU  Medication reconciliation completed: No      Automatic Epic Generated Protocol Data:        Requested Prescriptions   Pending Prescriptions Disp Refills    hydroCHLOROthiazide (HYDRODIURIL) 12.5 MG Tab [Pharmacy Med Name: HYDROCHLOROTHIAZIDE 12.5MG TABLETS] 90 tablet 0     Sig: TAKE 1 TABLET BY MOUTH ONCE DAILY       Cardiovascular: Diuretics - Thiazide Failed - 9/2/2020  3:23 AM        Failed - Last BP in normal range within 360 days.     BP Readings from Last 3 Encounters:   07/07/20 (!) 122/92   03/05/20 118/80   02/11/20 119/78              Failed - Cr is 1.3 or below and within 180 days     Creatinine   Date Value Ref Range Status   03/05/2020 0.67 0.50 - 1.10 mg/dL Final   01/15/2020 0.8 0.5 - 1.4 mg/dL Final   01/02/2019 0.8 0.5 - 1.4 mg/dL Final              Failed - K in normal range and within 180 days     Potassium   Date Value Ref Range Status   03/05/2020 3.9 3.5 - 5.3 mmol/L Final   01/15/2020 3.8 3.5 - 5.1 mmol/L Final   01/02/2019 3.7 3.5 - 5.1 mmol/L Final              Failed - Na is between 130 and 148 and within 180 days     Sodium   Date Value Ref Range Status   03/05/2020 139 135 - 146 mmol/L Final   01/15/2020 142 136 - 145 mmol/L Final   01/02/2019 139 136 - 145 mmol/L Final              Failed - eGFR within 180 days     eGFR if non    Date Value Ref Range Status   03/05/2020 108 > OR = 60 mL/min/1.73m2 Final   01/15/2020 >60 >60 mL/min/1.73 m^2 Final     Comment:     Calculation used to obtain the estimated  glomerular filtration  rate (eGFR) is the CKD-EPI equation.      01/02/2019 >60 >60 mL/min/1.73 m^2 Final     Comment:     Calculation used to obtain the estimated glomerular filtration  rate (eGFR) is the CKD-EPI equation.        eGFR if    Date Value Ref Range Status   03/05/2020 125 > OR = 60 mL/min/1.73m2 Final   01/15/2020 >60 >60 mL/min/1.73 m^2 Final   01/02/2019 >60 >60 mL/min/1.73 m^2 Final              Passed - Patient is at least 18 years old        Passed - Negative Pregnancy Status Check        Passed - Office visit in past 12 months or future 90 days.     Recent Outpatient Visits            1 month ago HTN (hypertension), benign    [facility] Shawnee Cisneros MD    6 months ago HTN (hypertension), benign    [facility] 3rd [facility] Dion Chen MD    6 months ago Migraine without aura and without status migrainosus, not intractable    [place] [facility] Abilio Valenzuela MD    7 months ago Annual physical exam    [facility] [facility] [address] Shawnee Cisneros MD    1 year ago Migraine with aura and without status migrainosus, not intractable    [place] [facility] Abilio Valenzuela MD                    Passed - Ca in normal range and within 360 days     Calcium   Date Value Ref Range Status   03/05/2020 9.5 8.6 - 10.2 mg/dL Final   01/15/2020 9.3 8.7 - 10.5 mg/dL Final   01/02/2019 9.7 8.7 - 10.5 mg/dL Final                    Appointments  past 12m or future 3m with PCP    Date Provider   Last Visit   7/7/2020 Shawnee Cisneros MD   Next Visit   Visit date not found Shawnee Cisneros MD   ED visits in past 90 days: 0     Note composed:12:27 PM 09/03/2020

## 2020-09-16 NOTE — PROGRESS NOTES
Digital Medicine: Health  Follow-Up    The history is provided by the patient.             Reason for review: Blood pressure at goal        Topics Covered on Call: stress     Additional Follow-up details: Feeling well. Pleased with readings. Reports no symptoms of low BP with lower readings.   Visit with nurse tomorrow.           Diet-Not assessed          Physical Activity-Not assessed    Medication Adherence-Medication Adherence not addressed.        Substance, Sleep, Stress-change  stress-assessed  Details:Notes less stress with new job   Intervention(s):    Sleep-not assessed  Details:  Intervention(s):    Alcohol -not assessed  Details:  Intervention(s):    Tobacco-Not Assessed  Details:  Intervention(s):    Additional details:Mrs. Lopez started a new job about a month ago. She feels that stress from her previous job was affecting her BP. She is pleased with low readings associated with decrease in stress. .            Addressed patient questions and patient has my contact information if needed prior to next outreach.     There are no preventive care reminders to display for this patient.    Last 5 Patient Entered Readings                                      Current 30 Day Average: 115/76     Recent Readings 9/13/2020 9/11/2020 9/10/2020 9/8/2020 9/4/2020    SBP (mmHg) 105 110 111 111 110    DBP (mmHg) 72 78 75 70 73    Pulse 108 87 105 85 102

## 2020-09-17 ENCOUNTER — TELEPHONE (OUTPATIENT)
Dept: INTERNAL MEDICINE | Facility: CLINIC | Age: 45
End: 2020-09-17

## 2020-09-17 ENCOUNTER — CLINICAL SUPPORT (OUTPATIENT)
Dept: INTERNAL MEDICINE | Facility: CLINIC | Age: 45
End: 2020-09-17
Payer: COMMERCIAL

## 2020-09-17 VITALS — SYSTOLIC BLOOD PRESSURE: 122 MMHG | OXYGEN SATURATION: 100 % | DIASTOLIC BLOOD PRESSURE: 80 MMHG | HEART RATE: 94 BPM

## 2020-09-17 LAB
CREAT SERPL-MCNC: 0.88 MG/DL (ref 0.5–1.1)
GFRSERPLBLD MDRD-ARVRAT: 80 ML/MIN/1.73M2
POTASSIUM SERPL-SCNC: 3.7 MMOL/L (ref 3.5–5.3)
SODIUM SERPL-SCNC: 139 MMOL/L (ref 135–146)

## 2020-09-17 PROCEDURE — 99999 PR PBB SHADOW E&M-EST. PATIENT-LVL II: ICD-10-PCS | Mod: PBBFAC,,,

## 2020-09-17 PROCEDURE — 99999 PR PBB SHADOW E&M-EST. PATIENT-LVL II: CPT | Mod: PBBFAC,,,

## 2020-09-17 NOTE — TELEPHONE ENCOUNTER
----- Message from Ekaterina Auguste LPN sent at 9/17/2020 11:02 AM CDT -----  Does patient have record of home blood pressure readings no. Readings have been averaging unknown. Last dose of blood pressure medication was taken at 0630am.Patient is asymptomatic. BP: 122/80 , Pulse: 94 .

## 2020-09-17 NOTE — PROGRESS NOTES
Lu Lopez 45 y.o. female is here today for Blood Pressure check.   History of HTN yes.    Review of patient's allergies indicates:  No Known Allergies  Creatinine   Date Value Ref Range Status   09/16/2020 0.88 0.50 - 1.10 mg/dL Final     Sodium   Date Value Ref Range Status   09/16/2020 139 135 - 146 mmol/L Final     Potassium   Date Value Ref Range Status   09/16/2020 3.7 3.5 - 5.3 mmol/L Final   ]  Patient verifies taking blood pressure medications on a regular basis at the same time of the day.     Current Outpatient Medications:     amitriptyline (ELAVIL) 25 MG tablet, TAKE 1 TABLET(25 MG) BY MOUTH EVERY NIGHT AS NEEDED FOR INSOMNIA, Disp: 90 tablet, Rfl: 3    hydroCHLOROthiazide (HYDRODIURIL) 12.5 MG Tab, TAKE 1 TABLET BY MOUTH ONCE DAILY, Disp: 90 tablet, Rfl: 0    norethindrone (MICRONOR) 0.35 mg tablet, , Disp: , Rfl:     ondansetron (ZOFRAN-ODT) 4 MG TbDL, DISSOLVE 2 TABLETS(8 MG) ON THE TONGUE TWICE DAILY AS NEEDED, Disp: 30 tablet, Rfl: 0    sumatriptan (IMITREX) 100 MG tablet, TAKE 1 TABLET BY MOUTH AT ONSET OF HEADACHE. REPEAT IN 2 HOURS IF NEEDED, Disp: 9 tablet, Rfl: 3    Current Facility-Administered Medications:     ondansetron disintegrating tablet 8 mg, 8 mg, Oral, 1 time in Clinic/HOD, Abilio Valenzuela MD  Does patient have record of home blood pressure readings no. Readings have been averaging unknown.   Last dose of blood pressure medication was taken at 0630am.  Patient is asymptomatic.   BP: 122/80 , Pulse: 94 .  Dr. Cisneros notified.

## 2020-09-17 NOTE — Clinical Note
Does patient have record of home blood pressure readings no. Readings have been averaging unknown.   Last dose of blood pressure medication was taken at 0630am.  Patient is asymptomatic.   BP: 122/80 , Pulse: 94 .

## 2020-11-25 ENCOUNTER — PATIENT OUTREACH (OUTPATIENT)
Dept: OTHER | Facility: OTHER | Age: 45
End: 2020-11-25

## 2020-12-02 ENCOUNTER — PATIENT MESSAGE (OUTPATIENT)
Dept: INTERNAL MEDICINE | Facility: CLINIC | Age: 45
End: 2020-12-02

## 2020-12-02 DIAGNOSIS — I10 HYPERTENSION, BENIGN: ICD-10-CM

## 2020-12-02 RX ORDER — HYDROCHLOROTHIAZIDE 12.5 MG/1
12.5 TABLET ORAL DAILY
Qty: 90 TABLET | Refills: 2 | Status: SHIPPED | OUTPATIENT
Start: 2020-12-02 | End: 2021-03-31 | Stop reason: SDUPTHER

## 2020-12-02 NOTE — TELEPHONE ENCOUNTER
No new care gaps identified.  Powered by [employer]. Reference number: [id number]. 12/02/2020 8:40:00 AM   CST

## 2020-12-02 NOTE — PROGRESS NOTES
Refill Authorization Note   Lu Lopez  is requesting a refill authorization.  Brief Assessment and Rationale for Refill:  Approve     Medication Therapy Plan:  [facility]    Medication Reconciliation Completed: No   Comments:   Orders Placed This Encounter    hydroCHLOROthiazide (HYDRODIURIL) 12.5 MG Tab      Requested Prescriptions   Signed Prescriptions Disp Refills    hydroCHLOROthiazide (HYDRODIURIL) 12.5 MG Tab 90 tablet 2     Sig: Take 1 tablet (12.5 mg total) by mouth once daily.       Cardiovascular: Diuretics - Thiazide Passed - 12/2/2020  8:39 AM        Passed - Patient is at least 18 years old        Passed - Negative Pregnancy Status Check        Passed - Last BP in normal range within 360 days.     BP Readings from Last 3 Encounters:   09/17/20 122/80   07/07/20 (!) 122/92   03/05/20 118/80              Passed - Office visit in past 12 months or future 90 days     Recent Outpatient Visits            4 months ago HTN (hypertension), benign    [facility] [address] Shawnee Cisneros MD    9 months ago HTN (hypertension), benign    [facility] 3rd [facility] Dion Chen MD    9 months ago Migraine without aura and without status migrainosus, not intractable    [facility]    10 months ago Annual physical exam    [facility] [address] Shawnee Cisneros MD    1 year ago Migraine with aura and without status migrainosus, not intractable    [place] [facility]          Future Appointments              In 1 month Shawnee Cisneros MD [facility] [address], [facility]                Passed - Cr is 1.4 or below and within 360 days     Creatinine   Date Value Ref Range Status   09/16/2020 0.88 0.50 - 1.10 mg/dL Final   03/05/2020 0.67 0.50 - 1.10 mg/dL Final   01/15/2020 0.8 0.5 - 1.4 mg/dL Final              Passed - K in normal range and within 360 days     Potassium    Date Value Ref Range Status   09/16/2020 3.7 3.5 - 5.3 mmol/L Final   03/05/2020 3.9 3.5 - 5.3 mmol/L Final   01/15/2020 3.8 3.5 - 5.1 mmol/L Final              Passed - Na is between 130 and 148 and within 360 days     Sodium   Date Value Ref Range Status   09/16/2020 139 135 - 146 mmol/L Final   03/05/2020 139 135 - 146 mmol/L Final   01/15/2020 142 136 - 145 mmol/L Final              Passed - eGFR within 360 days     eGFR if non    Date Value Ref Range Status   09/16/2020 80 > OR = 60 mL/min/1.73m2 Final   03/05/2020 108 > OR = 60 mL/min/1.73m2 Final   01/15/2020 >60 >60 mL/min/1.73 m^2 Final     Comment:     Calculation used to obtain the estimated glomerular filtration  rate (eGFR) is the CKD-EPI equation.        eGFR if    Date Value Ref Range Status   09/16/2020 93 > OR = 60 mL/min/1.73m2 Final   03/05/2020 125 > OR = 60 mL/min/1.73m2 Final   01/15/2020 >60 >60 mL/min/1.73 m^2 Final                  Appointments  past 12m or future 3m with PCP    Date Provider   Last Visit   7/7/2020 Shawnee Cisneros MD   Next Visit   1/19/2021 Shawnee Cisneros MD   ED visits in past 90 days: 0     Note composed:12:45 PM 12/02/2020

## 2020-12-17 ENCOUNTER — TELEPHONE (OUTPATIENT)
Dept: INTERNAL MEDICINE | Facility: CLINIC | Age: 45
End: 2020-12-17

## 2020-12-17 NOTE — TELEPHONE ENCOUNTER
Attempted to contact patient to reschedule 1/19 appointment due to provider being out. Left message on voicemail to contact the office.

## 2020-12-23 ENCOUNTER — PATIENT OUTREACH (OUTPATIENT)
Dept: OTHER | Facility: OTHER | Age: 45
End: 2020-12-23

## 2020-12-23 NOTE — PROGRESS NOTES
Attempted to reach patient for routine follow up. Reviewed available blood pressure readings and labs. Per 2017 ACC/AHA Hypertension Guidelines, current 30-day average is at goal.     Last 5 Patient Entered Readings                                      Current 30 Day Average: 117/79     Recent Readings 12/21/2020 12/18/2020 12/17/2020 12/14/2020 12/10/2020    SBP (mmHg) 106 125 113 121 120    DBP (mmHg) 74 82 77 82 78    Pulse 100 76 81 76 93        Hypertension Medications             hydroCHLOROthiazide (HYDRODIURIL) 12.5 MG Tab Take 1 tablet (12.5 mg total) by mouth once daily.          Unable to leave voicemail. Sent "[employer]" message and requested patient call back if She has any questions or concerns.     Will continue to monitor regularly. Will follow up in 4-6 months, sooner if blood pressure begins to trend up.    Addendum: Patient responded that medication is correct.

## 2021-01-26 RX ORDER — AMITRIPTYLINE HYDROCHLORIDE 25 MG/1
TABLET, FILM COATED ORAL
Qty: 30 TABLET | Refills: 0 | Status: SHIPPED | OUTPATIENT
Start: 2021-01-26 | End: 2021-02-18

## 2021-01-27 ENCOUNTER — PATIENT MESSAGE (OUTPATIENT)
Dept: ADMINISTRATIVE | Facility: OTHER | Age: 46
End: 2021-01-27

## 2021-02-26 ENCOUNTER — OFFICE VISIT (OUTPATIENT)
Dept: INTERNAL MEDICINE | Facility: CLINIC | Age: 46
End: 2021-02-26
Attending: INTERNAL MEDICINE
Payer: COMMERCIAL

## 2021-02-26 VITALS
SYSTOLIC BLOOD PRESSURE: 122 MMHG | HEART RATE: 93 BPM | HEIGHT: 67 IN | DIASTOLIC BLOOD PRESSURE: 78 MMHG | OXYGEN SATURATION: 99 % | BODY MASS INDEX: 22.32 KG/M2 | WEIGHT: 142.19 LBS

## 2021-02-26 DIAGNOSIS — G47.00 INSOMNIA, UNSPECIFIED TYPE: ICD-10-CM

## 2021-02-26 DIAGNOSIS — I10 ESSENTIAL HYPERTENSION: ICD-10-CM

## 2021-02-26 DIAGNOSIS — G43.109 MIGRAINE WITH AURA AND WITHOUT STATUS MIGRAINOSUS, NOT INTRACTABLE: ICD-10-CM

## 2021-02-26 DIAGNOSIS — Z00.00 ANNUAL PHYSICAL EXAM: Primary | ICD-10-CM

## 2021-02-26 PROCEDURE — 1126F PR PAIN SEVERITY QUANTIFIED, NO PAIN PRESENT: ICD-10-PCS | Mod: S$GLB,,, | Performed by: INTERNAL MEDICINE

## 2021-02-26 PROCEDURE — 99396 PR PREVENTIVE VISIT,EST,40-64: ICD-10-PCS | Mod: S$GLB,,, | Performed by: INTERNAL MEDICINE

## 2021-02-26 PROCEDURE — 3008F BODY MASS INDEX DOCD: CPT | Mod: CPTII,S$GLB,, | Performed by: INTERNAL MEDICINE

## 2021-02-26 PROCEDURE — 99999 PR PBB SHADOW E&M-EST. PATIENT-LVL III: CPT | Mod: PBBFAC,,, | Performed by: INTERNAL MEDICINE

## 2021-02-26 PROCEDURE — 3078F PR MOST RECENT DIASTOLIC BLOOD PRESSURE < 80 MM HG: ICD-10-PCS | Mod: CPTII,S$GLB,, | Performed by: INTERNAL MEDICINE

## 2021-02-26 PROCEDURE — 1126F AMNT PAIN NOTED NONE PRSNT: CPT | Mod: S$GLB,,, | Performed by: INTERNAL MEDICINE

## 2021-02-26 PROCEDURE — 3074F SYST BP LT 130 MM HG: CPT | Mod: CPTII,S$GLB,, | Performed by: INTERNAL MEDICINE

## 2021-02-26 PROCEDURE — 3008F PR BODY MASS INDEX (BMI) DOCUMENTED: ICD-10-PCS | Mod: CPTII,S$GLB,, | Performed by: INTERNAL MEDICINE

## 2021-02-26 PROCEDURE — 99396 PREV VISIT EST AGE 40-64: CPT | Mod: S$GLB,,, | Performed by: INTERNAL MEDICINE

## 2021-02-26 PROCEDURE — 3078F DIAST BP <80 MM HG: CPT | Mod: CPTII,S$GLB,, | Performed by: INTERNAL MEDICINE

## 2021-02-26 PROCEDURE — 99999 PR PBB SHADOW E&M-EST. PATIENT-LVL III: ICD-10-PCS | Mod: PBBFAC,,, | Performed by: INTERNAL MEDICINE

## 2021-02-26 PROCEDURE — 3074F PR MOST RECENT SYSTOLIC BLOOD PRESSURE < 130 MM HG: ICD-10-PCS | Mod: CPTII,S$GLB,, | Performed by: INTERNAL MEDICINE

## 2021-02-26 RX ORDER — AMITRIPTYLINE HYDROCHLORIDE 50 MG/1
TABLET, FILM COATED ORAL
Qty: 30 TABLET | Refills: 3 | Status: SHIPPED | OUTPATIENT
Start: 2021-02-26 | End: 2021-07-06

## 2021-02-26 RX ORDER — ONDANSETRON 4 MG/1
TABLET, ORALLY DISINTEGRATING ORAL
Qty: 30 TABLET | Refills: 0 | Status: SHIPPED | OUTPATIENT
Start: 2021-02-26 | End: 2022-05-10 | Stop reason: SDUPTHER

## 2021-02-28 PROBLEM — G47.9 SLEEP DISORDER: Status: RESOLVED | Noted: 2017-10-21 | Resolved: 2021-02-28

## 2021-02-28 PROBLEM — R03.0 BORDERLINE HIGH BLOOD PRESSURE: Status: RESOLVED | Noted: 2019-12-08 | Resolved: 2021-02-28

## 2021-03-01 ENCOUNTER — TELEPHONE (OUTPATIENT)
Dept: INTERNAL MEDICINE | Facility: CLINIC | Age: 46
End: 2021-03-01

## 2021-03-01 ENCOUNTER — PATIENT MESSAGE (OUTPATIENT)
Dept: INTERNAL MEDICINE | Facility: CLINIC | Age: 46
End: 2021-03-01

## 2021-03-01 ENCOUNTER — LAB VISIT (OUTPATIENT)
Dept: LAB | Facility: OTHER | Age: 46
End: 2021-03-01
Attending: INTERNAL MEDICINE
Payer: COMMERCIAL

## 2021-03-01 DIAGNOSIS — D64.9 ANEMIA, UNSPECIFIED TYPE: Primary | ICD-10-CM

## 2021-03-01 DIAGNOSIS — Z00.00 ANNUAL PHYSICAL EXAM: ICD-10-CM

## 2021-03-01 LAB
25(OH)D3+25(OH)D2 SERPL-MCNC: 48 NG/ML (ref 30–96)
ALBUMIN SERPL BCP-MCNC: 4.2 G/DL (ref 3.5–5.2)
ALP SERPL-CCNC: 48 U/L (ref 55–135)
ALT SERPL W/O P-5'-P-CCNC: 12 U/L (ref 10–44)
ANION GAP SERPL CALC-SCNC: 8 MMOL/L (ref 8–16)
AST SERPL-CCNC: 13 U/L (ref 10–40)
BASOPHILS # BLD AUTO: 0.03 K/UL (ref 0–0.2)
BASOPHILS NFR BLD: 0.5 % (ref 0–1.9)
BILIRUB SERPL-MCNC: 0.6 MG/DL (ref 0.1–1)
BUN SERPL-MCNC: 13 MG/DL (ref 6–20)
CALCIUM SERPL-MCNC: 8.9 MG/DL (ref 8.7–10.5)
CHLORIDE SERPL-SCNC: 105 MMOL/L (ref 95–110)
CHOLEST SERPL-MCNC: 143 MG/DL (ref 120–199)
CHOLEST/HDLC SERPL: 2.8 {RATIO} (ref 2–5)
CO2 SERPL-SCNC: 25 MMOL/L (ref 23–29)
CREAT SERPL-MCNC: 0.7 MG/DL (ref 0.5–1.4)
DIFFERENTIAL METHOD: ABNORMAL
EOSINOPHIL # BLD AUTO: 0.2 K/UL (ref 0–0.5)
EOSINOPHIL NFR BLD: 3.1 % (ref 0–8)
ERYTHROCYTE [DISTWIDTH] IN BLOOD BY AUTOMATED COUNT: 13.4 % (ref 11.5–14.5)
EST. GFR  (AFRICAN AMERICAN): >60 ML/MIN/1.73 M^2
EST. GFR  (NON AFRICAN AMERICAN): >60 ML/MIN/1.73 M^2
GLUCOSE SERPL-MCNC: 88 MG/DL (ref 70–110)
HCT VFR BLD AUTO: 35.8 % (ref 37–48.5)
HDLC SERPL-MCNC: 51 MG/DL (ref 40–75)
HDLC SERPL: 35.7 % (ref 20–50)
HGB BLD-MCNC: 11.5 G/DL (ref 12–16)
IMM GRANULOCYTES # BLD AUTO: 0.01 K/UL (ref 0–0.04)
IMM GRANULOCYTES NFR BLD AUTO: 0.2 % (ref 0–0.5)
LDLC SERPL CALC-MCNC: 78.2 MG/DL (ref 63–159)
LYMPHOCYTES # BLD AUTO: 1.9 K/UL (ref 1–4.8)
LYMPHOCYTES NFR BLD: 32.1 % (ref 18–48)
MCH RBC QN AUTO: 27.4 PG (ref 27–31)
MCHC RBC AUTO-ENTMCNC: 32.1 G/DL (ref 32–36)
MCV RBC AUTO: 85 FL (ref 82–98)
MONOCYTES # BLD AUTO: 0.6 K/UL (ref 0.3–1)
MONOCYTES NFR BLD: 9.6 % (ref 4–15)
NEUTROPHILS # BLD AUTO: 3.3 K/UL (ref 1.8–7.7)
NEUTROPHILS NFR BLD: 54.5 % (ref 38–73)
NONHDLC SERPL-MCNC: 92 MG/DL
NRBC BLD-RTO: 0 /100 WBC
PLATELET # BLD AUTO: 281 K/UL (ref 150–350)
PMV BLD AUTO: 10.3 FL (ref 9.2–12.9)
POTASSIUM SERPL-SCNC: 3.5 MMOL/L (ref 3.5–5.1)
PROT SERPL-MCNC: 7.1 G/DL (ref 6–8.4)
RBC # BLD AUTO: 4.2 M/UL (ref 4–5.4)
SODIUM SERPL-SCNC: 138 MMOL/L (ref 136–145)
TRIGL SERPL-MCNC: 69 MG/DL (ref 30–150)
TSH SERPL DL<=0.005 MIU/L-ACNC: 1.78 UIU/ML (ref 0.4–4)
WBC # BLD AUTO: 6.05 K/UL (ref 3.9–12.7)

## 2021-03-01 PROCEDURE — 82306 VITAMIN D 25 HYDROXY: CPT

## 2021-03-01 PROCEDURE — 80061 LIPID PANEL: CPT

## 2021-03-01 PROCEDURE — 36415 COLL VENOUS BLD VENIPUNCTURE: CPT

## 2021-03-01 PROCEDURE — 80053 COMPREHEN METABOLIC PANEL: CPT

## 2021-03-01 PROCEDURE — 85025 COMPLETE CBC W/AUTO DIFF WBC: CPT

## 2021-03-01 PROCEDURE — 84443 ASSAY THYROID STIM HORMONE: CPT

## 2021-03-05 NOTE — PROGRESS NOTES
Slight upward trend noted  Health  completed outreach 3/2/21 and discussed increased stress  Patient seen by Dr. Cisneros 2/26/21 and no changes made to BP medications  Average 127/82 mmHg  Continue current regimen and monitoring

## 2021-03-15 ENCOUNTER — PATIENT MESSAGE (OUTPATIENT)
Dept: INTERNAL MEDICINE | Facility: CLINIC | Age: 46
End: 2021-03-15

## 2021-03-15 ENCOUNTER — LAB VISIT (OUTPATIENT)
Dept: LAB | Facility: OTHER | Age: 46
End: 2021-03-15
Attending: INTERNAL MEDICINE
Payer: COMMERCIAL

## 2021-03-15 DIAGNOSIS — D64.9 ANEMIA, UNSPECIFIED TYPE: ICD-10-CM

## 2021-03-15 LAB
FERRITIN SERPL-MCNC: 44 NG/ML (ref 20–300)
FOLATE SERPL-MCNC: 7 NG/ML (ref 4–24)
IRON SERPL-MCNC: 52 UG/DL (ref 30–160)
SATURATED IRON: 16 % (ref 20–50)
TOTAL IRON BINDING CAPACITY: 321 UG/DL (ref 250–450)
TRANSFERRIN SERPL-MCNC: 217 MG/DL (ref 200–375)
VIT B12 SERPL-MCNC: 244 PG/ML (ref 210–950)

## 2021-03-15 PROCEDURE — 82607 VITAMIN B-12: CPT | Performed by: INTERNAL MEDICINE

## 2021-03-15 PROCEDURE — 36415 COLL VENOUS BLD VENIPUNCTURE: CPT | Performed by: INTERNAL MEDICINE

## 2021-03-15 PROCEDURE — 82746 ASSAY OF FOLIC ACID SERUM: CPT | Performed by: INTERNAL MEDICINE

## 2021-03-15 PROCEDURE — 83540 ASSAY OF IRON: CPT | Performed by: INTERNAL MEDICINE

## 2021-03-15 PROCEDURE — 82728 ASSAY OF FERRITIN: CPT | Performed by: INTERNAL MEDICINE

## 2021-03-17 ENCOUNTER — PATIENT MESSAGE (OUTPATIENT)
Dept: INTERNAL MEDICINE | Facility: CLINIC | Age: 46
End: 2021-03-17

## 2021-03-17 DIAGNOSIS — E53.8 LOW SERUM VITAMIN B12: ICD-10-CM

## 2021-03-17 DIAGNOSIS — O28.0 LOW MATERNAL SERUM VITAMIN B12: ICD-10-CM

## 2021-03-17 DIAGNOSIS — D64.9 ANEMIA, UNSPECIFIED TYPE: Primary | ICD-10-CM

## 2021-03-18 ENCOUNTER — IMMUNIZATION (OUTPATIENT)
Dept: INTERNAL MEDICINE | Facility: CLINIC | Age: 46
End: 2021-03-18
Payer: COMMERCIAL

## 2021-03-18 DIAGNOSIS — Z23 NEED FOR VACCINATION: Primary | ICD-10-CM

## 2021-03-18 PROCEDURE — 91300 COVID-19, MRNA, LNP-S, PF, 30 MCG/0.3 ML DOSE VACCINE: CPT | Mod: PBBFAC | Performed by: INTERNAL MEDICINE

## 2021-03-25 ENCOUNTER — LAB VISIT (OUTPATIENT)
Dept: LAB | Facility: OTHER | Age: 46
End: 2021-03-25
Attending: INTERNAL MEDICINE
Payer: COMMERCIAL

## 2021-03-25 DIAGNOSIS — E53.8 LOW SERUM VITAMIN B12: ICD-10-CM

## 2021-03-25 LAB — HCYS SERPL-SCNC: 8.9 UMOL/L (ref 4–15.5)

## 2021-03-25 PROCEDURE — 83921 ORGANIC ACID SINGLE QUANT: CPT | Performed by: INTERNAL MEDICINE

## 2021-03-25 PROCEDURE — 36415 COLL VENOUS BLD VENIPUNCTURE: CPT | Performed by: INTERNAL MEDICINE

## 2021-03-25 PROCEDURE — 83090 ASSAY OF HOMOCYSTEINE: CPT | Performed by: INTERNAL MEDICINE

## 2021-03-30 ENCOUNTER — PATIENT MESSAGE (OUTPATIENT)
Dept: INTERNAL MEDICINE | Facility: CLINIC | Age: 46
End: 2021-03-30

## 2021-03-30 DIAGNOSIS — I10 HYPERTENSION, BENIGN: ICD-10-CM

## 2021-03-30 LAB — METHYLMALONATE SERPL-SCNC: 0.23 UMOL/L

## 2021-04-02 RX ORDER — HYDROCHLOROTHIAZIDE 12.5 MG/1
12.5 TABLET ORAL DAILY
Qty: 90 TABLET | Refills: 3 | Status: SHIPPED | OUTPATIENT
Start: 2021-04-02 | End: 2022-06-18

## 2021-04-06 ENCOUNTER — PATIENT MESSAGE (OUTPATIENT)
Dept: INTERNAL MEDICINE | Facility: CLINIC | Age: 46
End: 2021-04-06

## 2021-04-08 ENCOUNTER — IMMUNIZATION (OUTPATIENT)
Dept: INTERNAL MEDICINE | Facility: CLINIC | Age: 46
End: 2021-04-08
Payer: COMMERCIAL

## 2021-04-08 DIAGNOSIS — Z23 NEED FOR VACCINATION: Primary | ICD-10-CM

## 2021-04-08 PROCEDURE — 91300 COVID-19, MRNA, LNP-S, PF, 30 MCG/0.3 ML DOSE VACCINE: CPT | Mod: PBBFAC | Performed by: INTERNAL MEDICINE

## 2021-04-08 PROCEDURE — 0002A COVID-19, MRNA, LNP-S, PF, 30 MCG/0.3 ML DOSE VACCINE: CPT | Mod: PBBFAC | Performed by: INTERNAL MEDICINE

## 2021-06-18 ENCOUNTER — LAB VISIT (OUTPATIENT)
Dept: LAB | Facility: OTHER | Age: 46
End: 2021-06-18
Attending: INTERNAL MEDICINE
Payer: COMMERCIAL

## 2021-06-18 DIAGNOSIS — D64.9 ANEMIA, UNSPECIFIED TYPE: ICD-10-CM

## 2021-06-18 LAB
BASOPHILS # BLD AUTO: 0.04 K/UL (ref 0–0.2)
BASOPHILS NFR BLD: 0.7 % (ref 0–1.9)
DIFFERENTIAL METHOD: NORMAL
EOSINOPHIL # BLD AUTO: 0.1 K/UL (ref 0–0.5)
EOSINOPHIL NFR BLD: 2 % (ref 0–8)
ERYTHROCYTE [DISTWIDTH] IN BLOOD BY AUTOMATED COUNT: 12.9 % (ref 11.5–14.5)
HCT VFR BLD AUTO: 38.4 % (ref 37–48.5)
HGB BLD-MCNC: 12.4 G/DL (ref 12–16)
IMM GRANULOCYTES # BLD AUTO: 0.02 K/UL (ref 0–0.04)
IMM GRANULOCYTES NFR BLD AUTO: 0.4 % (ref 0–0.5)
IRON SERPL-MCNC: 88 UG/DL (ref 30–160)
LYMPHOCYTES # BLD AUTO: 1.7 K/UL (ref 1–4.8)
LYMPHOCYTES NFR BLD: 30.6 % (ref 18–48)
MCH RBC QN AUTO: 27.7 PG (ref 27–31)
MCHC RBC AUTO-ENTMCNC: 32.3 G/DL (ref 32–36)
MCV RBC AUTO: 86 FL (ref 82–98)
MONOCYTES # BLD AUTO: 0.5 K/UL (ref 0.3–1)
MONOCYTES NFR BLD: 9.4 % (ref 4–15)
NEUTROPHILS # BLD AUTO: 3.2 K/UL (ref 1.8–7.7)
NEUTROPHILS NFR BLD: 56.9 % (ref 38–73)
NRBC BLD-RTO: 0 /100 WBC
PLATELET # BLD AUTO: 307 K/UL (ref 150–450)
PMV BLD AUTO: 10.1 FL (ref 9.2–12.9)
RBC # BLD AUTO: 4.47 M/UL (ref 4–5.4)
SATURATED IRON: 25 % (ref 20–50)
TOTAL IRON BINDING CAPACITY: 346 UG/DL (ref 250–450)
TRANSFERRIN SERPL-MCNC: 234 MG/DL (ref 200–375)
WBC # BLD AUTO: 5.53 K/UL (ref 3.9–12.7)

## 2021-06-18 PROCEDURE — 85025 COMPLETE CBC W/AUTO DIFF WBC: CPT | Performed by: INTERNAL MEDICINE

## 2021-06-18 PROCEDURE — 82728 ASSAY OF FERRITIN: CPT | Performed by: INTERNAL MEDICINE

## 2021-06-18 PROCEDURE — 83540 ASSAY OF IRON: CPT | Performed by: INTERNAL MEDICINE

## 2021-06-18 PROCEDURE — 36415 COLL VENOUS BLD VENIPUNCTURE: CPT | Performed by: INTERNAL MEDICINE

## 2021-06-19 LAB — FERRITIN SERPL-MCNC: 97 NG/ML (ref 20–300)

## 2021-07-06 RX ORDER — AMITRIPTYLINE HYDROCHLORIDE 50 MG/1
TABLET, FILM COATED ORAL
Qty: 30 TABLET | Refills: 3 | Status: SHIPPED | OUTPATIENT
Start: 2021-07-06 | End: 2021-10-01

## 2021-10-01 RX ORDER — AMITRIPTYLINE HYDROCHLORIDE 50 MG/1
TABLET, FILM COATED ORAL
Qty: 90 TABLET | Refills: 1 | Status: SHIPPED | OUTPATIENT
Start: 2021-10-01 | End: 2022-02-15

## 2021-12-15 LAB — HPV MRNA E6/E7: NOT DETECTED

## 2022-01-26 ENCOUNTER — PATIENT OUTREACH (OUTPATIENT)
Dept: ADMINISTRATIVE | Facility: OTHER | Age: 47
End: 2022-01-26
Payer: COMMERCIAL

## 2022-01-26 DIAGNOSIS — Z12.11 ENCOUNTER FOR FIT (FECAL IMMUNOCHEMICAL TEST) SCREENING: Primary | ICD-10-CM

## 2022-01-26 NOTE — PROGRESS NOTES
Care Everywhere: updated, uploaded 2021 mammogram report to health maintenance   Immunization:   Health Maintenance: updated  Media Review:   Legacy Review:   DIS:  Order placed: fit kit   Upcoming appts:  EFAX:  Task Tickets:  Referrals:

## 2022-01-27 ENCOUNTER — OFFICE VISIT (OUTPATIENT)
Dept: NEUROLOGY | Facility: CLINIC | Age: 47
End: 2022-01-27
Payer: COMMERCIAL

## 2022-01-27 VITALS — BODY MASS INDEX: 23.03 KG/M2 | WEIGHT: 147.06 LBS

## 2022-01-27 DIAGNOSIS — G43.109 MIGRAINE WITH AURA AND WITHOUT STATUS MIGRAINOSUS, NOT INTRACTABLE: Primary | ICD-10-CM

## 2022-01-27 PROCEDURE — 99214 OFFICE O/P EST MOD 30 MIN: CPT | Mod: S$GLB,,, | Performed by: NEUROMUSCULOSKELETAL MEDICINE & OMM

## 2022-01-27 PROCEDURE — 99999 PR PBB SHADOW E&M-EST. PATIENT-LVL II: ICD-10-PCS | Mod: PBBFAC,,, | Performed by: NEUROMUSCULOSKELETAL MEDICINE & OMM

## 2022-01-27 PROCEDURE — 1159F MED LIST DOCD IN RCRD: CPT | Mod: CPTII,S$GLB,, | Performed by: NEUROMUSCULOSKELETAL MEDICINE & OMM

## 2022-01-27 PROCEDURE — 3008F BODY MASS INDEX DOCD: CPT | Mod: CPTII,S$GLB,, | Performed by: NEUROMUSCULOSKELETAL MEDICINE & OMM

## 2022-01-27 PROCEDURE — 1159F PR MEDICATION LIST DOCUMENTED IN MEDICAL RECORD: ICD-10-PCS | Mod: CPTII,S$GLB,, | Performed by: NEUROMUSCULOSKELETAL MEDICINE & OMM

## 2022-01-27 PROCEDURE — 3008F PR BODY MASS INDEX (BMI) DOCUMENTED: ICD-10-PCS | Mod: CPTII,S$GLB,, | Performed by: NEUROMUSCULOSKELETAL MEDICINE & OMM

## 2022-01-27 PROCEDURE — 99999 PR PBB SHADOW E&M-EST. PATIENT-LVL II: CPT | Mod: PBBFAC,,, | Performed by: NEUROMUSCULOSKELETAL MEDICINE & OMM

## 2022-01-27 PROCEDURE — 99214 PR OFFICE/OUTPT VISIT, EST, LEVL IV, 30-39 MIN: ICD-10-PCS | Mod: S$GLB,,, | Performed by: NEUROMUSCULOSKELETAL MEDICINE & OMM

## 2022-01-27 NOTE — PROGRESS NOTES
Progress Notes   This history is dictated on [employer] word recognition program. There are word recognition mistakes that are occasionally missed on review.   Social History : Patient works for [employer]; out of her house. She has 3 girls. She has one girl with type 1 diabetes   Present Medical History:   patient presents for follow-up for her migraine headaches.  The last couple months she has had 1-2 headaches per month.  She continues to use Imitrex 100 mg which works within 2-3 hours.  She does have a history of high blood pressure.  She is not interested in migraine prevention medicine at the present time.      Previous note:  2-11-20  Patient has been doing really well with her migraines.  Her birth control pills which change to a progesterone only with the elimination of estrogen and she has had no migraines in the last month.  She continues to take the amitriptyline 25 mg at night predominantly for sleep.  She was recently diagnosed with high blood pressure which she takes medicine.  Previous note:  1-22-19:  The patient presents for follow-up for migraine headaches. She typically gets 2-3 headaches per month now. Imitrex works 98% of the time within less than 2 hr. She occasionally has to take a 2nd pill. She has had rainbow alteration of her vision on several occasions throughout the year preceding the headache.     Gen. Appearance: Well-developed with no obvious deformities   Carotid arteries symmetrical pulses   Peripheral vascular shows symmetrical pulses with no obvious edema or tenderness   Neurological Exam:   Mental status-alert and oriented to person, place, and time; attention span and concentration is good. Fund of knowledge-patient is aware of current events and able to give detailed history of the current problem.recent and remote memory seems intact. Language function is normal with no evidence of aphasia   Cranial nerves:Visual acuity to hand  chart -normal; visual fields to confrontation normal;pupils were equal and reactive to light ; funduscopic examination was normal with sharp disc margins. external ocular movements were full with no nystagmus. Facial sensation to pinprick : normal ; corneal reflexes intact; Facial muscles were symmetrical. Hearing is unimpaired symmetrical finger rub; Tongue movements - normal ; palate movements - normal ;Swallowing unimpaired. Shoulder shrug was intact with good strength Speech was normal   Motor examination: Upper : normal Lower extremities - Normal and symmetrical;muscle tone was normal ; right-handed   Sensory examination:Upper; normal pinprick and soft touch ; lower extremities - normal and symmetrical. Vibration sense: normal for age :15-20 seconds @ toes   Deep tendon reflexes: upper extremities :1-2+ symmetrical ; lower extremities KJ- 1-2 +; AJ - 1-2+ Both plantar responses were flexor   Cerebellar examination upper: Normal finger to nose and rapid alternating movements   Gait: Steady with no ataxia; heel and toe walk normal   Romberg test: negative Tandem gait: Normal   Involuntary movements: Negative   Cervical examination: Full range of motion with no pain Cervical tenderness :negative   Lumbar examination: Low back tenderness-negative Sciatic notch tenderness-negative Straight leg raising test-negative   Impression: hormonal migraines; anxiety disorder   Recommendations/plan:  ; continue Imitrex 100 mg when necessary for the migraines. Follow-up in  6 months

## 2022-02-15 ENCOUNTER — OFFICE VISIT (OUTPATIENT)
Dept: INTERNAL MEDICINE | Facility: CLINIC | Age: 47
End: 2022-02-15
Attending: INTERNAL MEDICINE
Payer: COMMERCIAL

## 2022-02-15 VITALS
HEIGHT: 67 IN | WEIGHT: 146.63 LBS | DIASTOLIC BLOOD PRESSURE: 84 MMHG | HEART RATE: 88 BPM | BODY MASS INDEX: 23.01 KG/M2 | OXYGEN SATURATION: 100 % | SYSTOLIC BLOOD PRESSURE: 124 MMHG

## 2022-02-15 DIAGNOSIS — D50.9 IRON DEFICIENCY ANEMIA, UNSPECIFIED IRON DEFICIENCY ANEMIA TYPE: ICD-10-CM

## 2022-02-15 DIAGNOSIS — G43.001 MIGRAINE WITHOUT AURA AND WITH STATUS MIGRAINOSUS, NOT INTRACTABLE: ICD-10-CM

## 2022-02-15 DIAGNOSIS — G47.00 INSOMNIA, UNSPECIFIED TYPE: ICD-10-CM

## 2022-02-15 DIAGNOSIS — Z00.00 ANNUAL PHYSICAL EXAM: Primary | ICD-10-CM

## 2022-02-15 DIAGNOSIS — I10 HTN (HYPERTENSION), BENIGN: ICD-10-CM

## 2022-02-15 DIAGNOSIS — Z12.11 SCREENING FOR COLON CANCER: ICD-10-CM

## 2022-02-15 DIAGNOSIS — Z11.59 ENCOUNTER FOR HEPATITIS C SCREENING TEST FOR LOW RISK PATIENT: ICD-10-CM

## 2022-02-15 PROCEDURE — 99396 PR PREVENTIVE VISIT,EST,40-64: ICD-10-PCS | Mod: S$GLB,,, | Performed by: INTERNAL MEDICINE

## 2022-02-15 PROCEDURE — 1160F RVW MEDS BY RX/DR IN RCRD: CPT | Mod: CPTII,S$GLB,, | Performed by: INTERNAL MEDICINE

## 2022-02-15 PROCEDURE — 1160F PR REVIEW ALL MEDS BY PRESCRIBER/CLIN PHARMACIST DOCUMENTED: ICD-10-PCS | Mod: CPTII,S$GLB,, | Performed by: INTERNAL MEDICINE

## 2022-02-15 PROCEDURE — 3074F PR MOST RECENT SYSTOLIC BLOOD PRESSURE < 130 MM HG: ICD-10-PCS | Mod: CPTII,S$GLB,, | Performed by: INTERNAL MEDICINE

## 2022-02-15 PROCEDURE — 99396 PREV VISIT EST AGE 40-64: CPT | Mod: S$GLB,,, | Performed by: INTERNAL MEDICINE

## 2022-02-15 PROCEDURE — 1159F MED LIST DOCD IN RCRD: CPT | Mod: CPTII,S$GLB,, | Performed by: INTERNAL MEDICINE

## 2022-02-15 PROCEDURE — 99999 PR PBB SHADOW E&M-EST. PATIENT-LVL IV: ICD-10-PCS | Mod: PBBFAC,,, | Performed by: INTERNAL MEDICINE

## 2022-02-15 PROCEDURE — 3079F PR MOST RECENT DIASTOLIC BLOOD PRESSURE 80-89 MM HG: ICD-10-PCS | Mod: CPTII,S$GLB,, | Performed by: INTERNAL MEDICINE

## 2022-02-15 PROCEDURE — 3008F PR BODY MASS INDEX (BMI) DOCUMENTED: ICD-10-PCS | Mod: CPTII,S$GLB,, | Performed by: INTERNAL MEDICINE

## 2022-02-15 PROCEDURE — 3074F SYST BP LT 130 MM HG: CPT | Mod: CPTII,S$GLB,, | Performed by: INTERNAL MEDICINE

## 2022-02-15 PROCEDURE — 3079F DIAST BP 80-89 MM HG: CPT | Mod: CPTII,S$GLB,, | Performed by: INTERNAL MEDICINE

## 2022-02-15 PROCEDURE — 1159F PR MEDICATION LIST DOCUMENTED IN MEDICAL RECORD: ICD-10-PCS | Mod: CPTII,S$GLB,, | Performed by: INTERNAL MEDICINE

## 2022-02-15 PROCEDURE — 3008F BODY MASS INDEX DOCD: CPT | Mod: CPTII,S$GLB,, | Performed by: INTERNAL MEDICINE

## 2022-02-15 PROCEDURE — 99999 PR PBB SHADOW E&M-EST. PATIENT-LVL IV: CPT | Mod: PBBFAC,,, | Performed by: INTERNAL MEDICINE

## 2022-02-15 RX ORDER — AMITRIPTYLINE HYDROCHLORIDE 25 MG/1
25 TABLET, FILM COATED ORAL NIGHTLY
Qty: 90 TABLET | Refills: 3 | Status: SHIPPED | OUTPATIENT
Start: 2022-02-15 | End: 2023-02-19 | Stop reason: SDUPTHER

## 2022-02-15 RX ORDER — TRAZODONE HYDROCHLORIDE 50 MG/1
TABLET ORAL
Qty: 30 TABLET | Refills: 1 | Status: SHIPPED | OUTPATIENT
Start: 2022-02-15 | End: 2022-03-10

## 2022-02-15 NOTE — PROGRESS NOTES
"Subjective:   Patient ID: Lu Lopez is a 46 y.o. female  Chief complaint:   Chief Complaint   Patient presents with    Annual Exam       HPI    Here for annual exam   Exercising consistently     Migraines:   - improved after switching ocps   - seen by neuro and now on elavil 50mg and will only get migraine if very stressed or after a few days of poor sleep - migraines are mild at this time     HTN:   - did get another job after lost prior job from Covid   - bp at goal   - in dig htn program  - [facility] med on med card     Vit d def:  - c/w otc vit d suppl    High risk for breast cancer:   mmg ordered through gyn at [facility] 6/2021 and breast MRI 12/2021 - [facility]    Insomnia:  - has periods when sx wax and wane  Taking elavil 50mg and not helpful for sleep   Tried nyquil, benadryl, melatonin and ineffective   - exercising as above     Anxiety:  - too flat on lexapro in past - manageable at this time     Jennifer:   Mild - stopped oral riona t end of last year due to constipation even with colace   - periods intermittent - spotting mild     HM:  Hep c screening   cscope    Review of Systems      Objective:  Vitals:    02/15/22 0906   BP: 124/84   BP Location: Left arm   Patient Position: Sitting   Pulse: 88   SpO2: 100%   Weight: 66.5 kg (146 lb 9.7 oz)   Height: 5' 7" (1.702 m)     Body mass index is 22.96 kg/m².    Physical Exam  Vitals reviewed.   Constitutional:       Appearance: Normal appearance. She is well-developed.   HENT:      Head: Normocephalic and atraumatic.      Right Ear: Tympanic membrane, ear canal and external ear normal.      Left Ear: Tympanic membrane, ear canal and external ear normal.      Nose:      Comments: Wearing mask   Eyes:      Extraocular Movements: Extraocular movements intact.      Conjunctiva/sclera: Conjunctivae normal.   Neck:      Thyroid: No thyromegaly.   Cardiovascular:      Rate and Rhythm: Normal rate and regular rhythm.      Pulses: Normal pulses.      Heart sounds: Normal " heart sounds.   Pulmonary:      Effort: Pulmonary effort is normal.      Breath sounds: Normal breath sounds.   Abdominal:      General: Bowel sounds are normal.      Palpations: Abdomen is soft.   Musculoskeletal:         General: No swelling or tenderness.      Cervical back: Neck supple.   Lymphadenopathy:      Cervical: No cervical adenopathy.   Skin:     General: Skin is warm and dry.      Capillary Refill: Capillary refill takes less than 2 seconds.   Neurological:      General: No focal deficit present.      Mental Status: She is alert and oriented to person, place, and time. Mental status is at baseline.   Psychiatric:         Behavior: Behavior normal.         Thought Content: Thought content normal.         Assessment:  1. Annual physical exam    2. HTN (hypertension), benign    3. Insomnia, unspecified type    4. Screening for colon cancer    5. Migraine without aura and with status migrainosus, not intractable    6. Encounter for hepatitis C screening test for low risk patient    7. Iron deficiency anemia, unspecified iron deficiency anemia type        Plan:  Lu was seen today for annual exam.    Diagnoses and all orders for this visit:    Annual physical exam  -     TSH; Future  -     CBC Auto Differential; Future  -     Comprehensive Metabolic Panel; Future  -     Lipid Panel; Future  -     Ferritin; Future  -     Iron and TIBC; Future    HTN (hypertension), benign    Insomnia, unspecified type  -     traZODone (DESYREL) 50 MG tablet; Take 1/2 tab to 1 tab nightly by mouth as needed for insomnia    Screening for colon cancer  -     Ambulatory referral/consult to Gastroenterology; Future    Migraine without aura and with status migrainosus, not intractable  -     amitriptyline (ELAVIL) 25 MG tablet; Take 1 tablet (25 mg total) by mouth every evening.    Encounter for hepatitis C screening test for low risk patient  -     Hepatitis C Antibody; Future    Iron deficiency anemia, unspecified iron  deficiency anemia type  -     Ferritin; Future  -     Iron and TIBC; Future      Here for annual exam   Cont current bp med   Dec elavil to 25mg - will plan to stop if migraines stable in 4 weeks   Start trial of trazodone - she will update me on how responding to this in a couple of weeks or sooner if any issues     Health Maintenance   Topic Date Due    Hepatitis C Screening  Never done    Mammogram  06/18/2022    Lipid Panel  03/01/2026    TETANUS VACCINE  01/02/2029

## 2022-02-16 ENCOUNTER — LAB VISIT (OUTPATIENT)
Dept: LAB | Facility: OTHER | Age: 47
End: 2022-02-16
Attending: INTERNAL MEDICINE
Payer: COMMERCIAL

## 2022-02-16 DIAGNOSIS — Z11.59 ENCOUNTER FOR HEPATITIS C SCREENING TEST FOR LOW RISK PATIENT: ICD-10-CM

## 2022-02-16 DIAGNOSIS — D50.9 IRON DEFICIENCY ANEMIA, UNSPECIFIED IRON DEFICIENCY ANEMIA TYPE: ICD-10-CM

## 2022-02-16 DIAGNOSIS — Z00.00 ANNUAL PHYSICAL EXAM: ICD-10-CM

## 2022-02-16 LAB
ALBUMIN SERPL BCP-MCNC: 3.9 G/DL (ref 3.5–5.2)
ALP SERPL-CCNC: 39 U/L (ref 55–135)
ALT SERPL W/O P-5'-P-CCNC: 12 U/L (ref 10–44)
ANION GAP SERPL CALC-SCNC: 9 MMOL/L (ref 8–16)
AST SERPL-CCNC: 18 U/L (ref 10–40)
BASOPHILS # BLD AUTO: 0.04 K/UL (ref 0–0.2)
BASOPHILS NFR BLD: 0.7 % (ref 0–1.9)
BILIRUB SERPL-MCNC: 0.5 MG/DL (ref 0.1–1)
BUN SERPL-MCNC: 19 MG/DL (ref 6–20)
CALCIUM SERPL-MCNC: 8.7 MG/DL (ref 8.7–10.5)
CHLORIDE SERPL-SCNC: 108 MMOL/L (ref 95–110)
CHOLEST SERPL-MCNC: 159 MG/DL (ref 120–199)
CHOLEST/HDLC SERPL: 2.8 {RATIO} (ref 2–5)
CO2 SERPL-SCNC: 25 MMOL/L (ref 23–29)
CREAT SERPL-MCNC: 0.8 MG/DL (ref 0.5–1.4)
DIFFERENTIAL METHOD: NORMAL
EOSINOPHIL # BLD AUTO: 0.3 K/UL (ref 0–0.5)
EOSINOPHIL NFR BLD: 5.6 % (ref 0–8)
ERYTHROCYTE [DISTWIDTH] IN BLOOD BY AUTOMATED COUNT: 13 % (ref 11.5–14.5)
EST. GFR  (AFRICAN AMERICAN): >60 ML/MIN/1.73 M^2
EST. GFR  (NON AFRICAN AMERICAN): >60 ML/MIN/1.73 M^2
FERRITIN SERPL-MCNC: 89 NG/ML (ref 20–300)
GLUCOSE SERPL-MCNC: 93 MG/DL (ref 70–110)
HCT VFR BLD AUTO: 37.7 % (ref 37–48.5)
HDLC SERPL-MCNC: 57 MG/DL (ref 40–75)
HDLC SERPL: 35.8 % (ref 20–50)
HGB BLD-MCNC: 12.1 G/DL (ref 12–16)
IMM GRANULOCYTES # BLD AUTO: 0.01 K/UL (ref 0–0.04)
IMM GRANULOCYTES NFR BLD AUTO: 0.2 % (ref 0–0.5)
IRON SERPL-MCNC: 123 UG/DL (ref 30–160)
LDLC SERPL CALC-MCNC: 87.4 MG/DL (ref 63–159)
LYMPHOCYTES # BLD AUTO: 1.9 K/UL (ref 1–4.8)
LYMPHOCYTES NFR BLD: 31.7 % (ref 18–48)
MCH RBC QN AUTO: 27.9 PG (ref 27–31)
MCHC RBC AUTO-ENTMCNC: 32.1 G/DL (ref 32–36)
MCV RBC AUTO: 87 FL (ref 82–98)
MONOCYTES # BLD AUTO: 0.6 K/UL (ref 0.3–1)
MONOCYTES NFR BLD: 10.4 % (ref 4–15)
NEUTROPHILS # BLD AUTO: 3 K/UL (ref 1.8–7.7)
NEUTROPHILS NFR BLD: 51.4 % (ref 38–73)
NONHDLC SERPL-MCNC: 102 MG/DL
NRBC BLD-RTO: 0 /100 WBC
PLATELET # BLD AUTO: 322 K/UL (ref 150–450)
PMV BLD AUTO: 10.7 FL (ref 9.2–12.9)
POTASSIUM SERPL-SCNC: 3.7 MMOL/L (ref 3.5–5.1)
PROT SERPL-MCNC: 7 G/DL (ref 6–8.4)
RBC # BLD AUTO: 4.33 M/UL (ref 4–5.4)
SATURATED IRON: 40 % (ref 20–50)
SODIUM SERPL-SCNC: 142 MMOL/L (ref 136–145)
TOTAL IRON BINDING CAPACITY: 306 UG/DL (ref 250–450)
TRANSFERRIN SERPL-MCNC: 207 MG/DL (ref 200–375)
TRIGL SERPL-MCNC: 73 MG/DL (ref 30–150)
TSH SERPL DL<=0.005 MIU/L-ACNC: 1.67 UIU/ML (ref 0.4–4)
WBC # BLD AUTO: 5.89 K/UL (ref 3.9–12.7)

## 2022-02-16 PROCEDURE — 84443 ASSAY THYROID STIM HORMONE: CPT | Performed by: INTERNAL MEDICINE

## 2022-02-16 PROCEDURE — 85025 COMPLETE CBC W/AUTO DIFF WBC: CPT | Performed by: INTERNAL MEDICINE

## 2022-02-16 PROCEDURE — 82728 ASSAY OF FERRITIN: CPT | Performed by: INTERNAL MEDICINE

## 2022-02-16 PROCEDURE — 80053 COMPREHEN METABOLIC PANEL: CPT | Performed by: INTERNAL MEDICINE

## 2022-02-16 PROCEDURE — 36415 COLL VENOUS BLD VENIPUNCTURE: CPT | Performed by: INTERNAL MEDICINE

## 2022-02-16 PROCEDURE — 80061 LIPID PANEL: CPT | Performed by: INTERNAL MEDICINE

## 2022-02-16 PROCEDURE — 84466 ASSAY OF TRANSFERRIN: CPT | Performed by: INTERNAL MEDICINE

## 2022-02-16 PROCEDURE — 86803 HEPATITIS C AB TEST: CPT | Performed by: INTERNAL MEDICINE

## 2022-02-18 LAB — HCV AB SERPL QL IA: NEGATIVE

## 2022-03-02 NOTE — PROGRESS NOTES
BP remains at goal, average 120/76 mmHg    Hypertension Medications             hydroCHLOROthiazide (HYDRODIURIL) 12.5 MG Tab Take 1 tablet (12.5 mg total) by mouth once daily.           detailed exam

## 2022-03-07 ENCOUNTER — PATIENT MESSAGE (OUTPATIENT)
Dept: ADMINISTRATIVE | Facility: OTHER | Age: 47
End: 2022-03-07
Payer: COMMERCIAL

## 2022-03-09 DIAGNOSIS — G47.00 INSOMNIA, UNSPECIFIED TYPE: ICD-10-CM

## 2022-03-09 NOTE — TELEPHONE ENCOUNTER
No new care gaps identified.  Powered by [employer]. Reference number: [id number].   3/09/2022 7:33:05 AM CST

## 2022-03-10 RX ORDER — TRAZODONE HYDROCHLORIDE 50 MG/1
TABLET ORAL
Qty: 30 TABLET | Refills: 3 | Status: SHIPPED | OUTPATIENT
Start: 2022-03-10 | End: 2022-06-09

## 2022-03-10 NOTE — TELEPHONE ENCOUNTER
This Rx Request does not qualify for assessment with the ORC   Please review protocol details and the Care Due Message for extra clinical information    Reasons Rx Request may be deferred:  Labs/Vitals overdue  Labs/Vitals abnormal  Patient has been seen in the ED/Hospital since the last PCP visit  Medication is non-delegated  Provider is a non-participating provider  Medication diagnosis association not within scope for ORC    Note composed:10:38 PM 03/09/2022

## 2022-04-27 ENCOUNTER — PATIENT OUTREACH (OUTPATIENT)
Dept: INTERNAL MEDICINE | Facility: CLINIC | Age: 47
End: 2022-04-27
Payer: COMMERCIAL

## 2022-05-10 RX ORDER — ONDANSETRON 4 MG/1
TABLET, ORALLY DISINTEGRATING ORAL
Qty: 30 TABLET | Refills: 0 | Status: SHIPPED | OUTPATIENT
Start: 2022-05-10 | End: 2022-08-10

## 2022-05-10 NOTE — TELEPHONE ENCOUNTER
No new care gaps identified.  [facility] Embedded Care Gaps. Reference number: [id number]. 5/10/2022   3:21:59 PM CDT

## 2022-05-25 ENCOUNTER — PATIENT MESSAGE (OUTPATIENT)
Dept: OTHER | Facility: OTHER | Age: 47
End: 2022-05-25
Payer: COMMERCIAL

## 2022-06-09 DIAGNOSIS — G47.00 INSOMNIA, UNSPECIFIED TYPE: ICD-10-CM

## 2022-06-09 RX ORDER — TRAZODONE HYDROCHLORIDE 50 MG/1
TABLET ORAL
Qty: 90 TABLET | Refills: 1 | Status: SHIPPED | OUTPATIENT
Start: 2022-06-09 | End: 2022-10-31

## 2022-06-09 NOTE — TELEPHONE ENCOUNTER
Refill Routing Note   Medication(s) are not appropriate for processing by [facility] for the following reason(s):      - Indication is outside of scope for ORC    ORC action(s):  Defer          Medication reconciliation completed: No     Appointments  past 12m or future 3m with PCP    Date Provider   Last Visit   2/15/2022 Shawnee Cisneros MD   Next Visit   Visit date not found Shawnee Cisneros MD   ED visits in past 90 days: 0        Note composed:10:13 AM 06/09/2022

## 2022-06-09 NOTE — TELEPHONE ENCOUNTER
No new care gaps identified.  [facility] Embedded Care Gaps. Reference number: [id number]. 6/09/2022   12:20:17 AM CDT

## 2022-06-18 DIAGNOSIS — I10 HYPERTENSION, BENIGN: ICD-10-CM

## 2022-06-18 RX ORDER — HYDROCHLOROTHIAZIDE 12.5 MG/1
TABLET ORAL
Qty: 90 TABLET | Refills: 2 | Status: SHIPPED | OUTPATIENT
Start: 2022-06-18 | End: 2023-02-19 | Stop reason: SDUPTHER

## 2022-06-18 NOTE — TELEPHONE ENCOUNTER
No new care gaps identified.  [facility] Embedded Care Gaps. Reference number: [id number]. 6/18/2022   8:10:33 AM CDT

## 2022-06-18 NOTE — TELEPHONE ENCOUNTER
Refill Authorization Note   Lu Lopez  is requesting a refill authorization.  Brief Assessment and Rationale for Refill:  Approve     Medication Therapy Plan:       Medication Reconciliation Completed: No   Comments:     No Care Gaps recommended.     Note composed:4:44 PM 06/18/2022

## 2022-07-27 ENCOUNTER — OFFICE VISIT (OUTPATIENT)
Dept: NEUROLOGY | Facility: CLINIC | Age: 47
End: 2022-07-27
Payer: COMMERCIAL

## 2022-07-27 VITALS
SYSTOLIC BLOOD PRESSURE: 142 MMHG | WEIGHT: 143.31 LBS | HEART RATE: 93 BPM | HEIGHT: 67 IN | BODY MASS INDEX: 22.49 KG/M2 | DIASTOLIC BLOOD PRESSURE: 92 MMHG

## 2022-07-27 DIAGNOSIS — G43.109 MIGRAINE WITH AURA AND WITHOUT STATUS MIGRAINOSUS, NOT INTRACTABLE: Primary | ICD-10-CM

## 2022-07-27 PROCEDURE — 3008F BODY MASS INDEX DOCD: CPT | Mod: CPTII,S$GLB,, | Performed by: NEUROMUSCULOSKELETAL MEDICINE & OMM

## 2022-07-27 PROCEDURE — 3080F PR MOST RECENT DIASTOLIC BLOOD PRESSURE >= 90 MM HG: ICD-10-PCS | Mod: CPTII,S$GLB,, | Performed by: NEUROMUSCULOSKELETAL MEDICINE & OMM

## 2022-07-27 PROCEDURE — 3008F PR BODY MASS INDEX (BMI) DOCUMENTED: ICD-10-PCS | Mod: CPTII,S$GLB,, | Performed by: NEUROMUSCULOSKELETAL MEDICINE & OMM

## 2022-07-27 PROCEDURE — 1159F PR MEDICATION LIST DOCUMENTED IN MEDICAL RECORD: ICD-10-PCS | Mod: CPTII,S$GLB,, | Performed by: NEUROMUSCULOSKELETAL MEDICINE & OMM

## 2022-07-27 PROCEDURE — 99214 PR OFFICE/OUTPT VISIT, EST, LEVL IV, 30-39 MIN: ICD-10-PCS | Mod: S$GLB,,, | Performed by: NEUROMUSCULOSKELETAL MEDICINE & OMM

## 2022-07-27 PROCEDURE — 3080F DIAST BP >= 90 MM HG: CPT | Mod: CPTII,S$GLB,, | Performed by: NEUROMUSCULOSKELETAL MEDICINE & OMM

## 2022-07-27 PROCEDURE — 99999 PR PBB SHADOW E&M-EST. PATIENT-LVL III: CPT | Mod: PBBFAC,,, | Performed by: NEUROMUSCULOSKELETAL MEDICINE & OMM

## 2022-07-27 PROCEDURE — 1159F MED LIST DOCD IN RCRD: CPT | Mod: CPTII,S$GLB,, | Performed by: NEUROMUSCULOSKELETAL MEDICINE & OMM

## 2022-07-27 PROCEDURE — 3077F PR MOST RECENT SYSTOLIC BLOOD PRESSURE >= 140 MM HG: ICD-10-PCS | Mod: CPTII,S$GLB,, | Performed by: NEUROMUSCULOSKELETAL MEDICINE & OMM

## 2022-07-27 PROCEDURE — 99999 PR PBB SHADOW E&M-EST. PATIENT-LVL III: ICD-10-PCS | Mod: PBBFAC,,, | Performed by: NEUROMUSCULOSKELETAL MEDICINE & OMM

## 2022-07-27 PROCEDURE — 3077F SYST BP >= 140 MM HG: CPT | Mod: CPTII,S$GLB,, | Performed by: NEUROMUSCULOSKELETAL MEDICINE & OMM

## 2022-07-27 PROCEDURE — 99214 OFFICE O/P EST MOD 30 MIN: CPT | Mod: S$GLB,,, | Performed by: NEUROMUSCULOSKELETAL MEDICINE & OMM

## 2022-07-27 RX ORDER — ACETAMINOPHEN AND CODEINE PHOSPHATE 120; 12 MG/5ML; MG/5ML
1 SOLUTION ORAL DAILY
COMMUNITY
Start: 2021-12-13

## 2022-10-17 ENCOUNTER — PATIENT MESSAGE (OUTPATIENT)
Dept: INTERNAL MEDICINE | Facility: CLINIC | Age: 47
End: 2022-10-17
Payer: COMMERCIAL

## 2022-10-20 ENCOUNTER — OFFICE VISIT (OUTPATIENT)
Dept: INTERNAL MEDICINE | Facility: CLINIC | Age: 47
End: 2022-10-20
Attending: INTERNAL MEDICINE
Payer: COMMERCIAL

## 2022-10-20 VITALS
BODY MASS INDEX: 22.07 KG/M2 | SYSTOLIC BLOOD PRESSURE: 132 MMHG | WEIGHT: 140.63 LBS | DIASTOLIC BLOOD PRESSURE: 90 MMHG | HEART RATE: 74 BPM | HEIGHT: 67 IN | OXYGEN SATURATION: 99 %

## 2022-10-20 DIAGNOSIS — R10.13 EPIGASTRIC PAIN: ICD-10-CM

## 2022-10-20 DIAGNOSIS — R10.13 DYSPEPSIA: Primary | ICD-10-CM

## 2022-10-20 PROCEDURE — 99214 PR OFFICE/OUTPT VISIT, EST, LEVL IV, 30-39 MIN: ICD-10-PCS | Mod: S$GLB,,, | Performed by: FAMILY MEDICINE

## 2022-10-20 PROCEDURE — 99999 PR PBB SHADOW E&M-EST. PATIENT-LVL IV: CPT | Mod: PBBFAC,,, | Performed by: FAMILY MEDICINE

## 2022-10-20 PROCEDURE — 99214 OFFICE O/P EST MOD 30 MIN: CPT | Mod: S$GLB,,, | Performed by: FAMILY MEDICINE

## 2022-10-20 PROCEDURE — 99999 PR PBB SHADOW E&M-EST. PATIENT-LVL IV: ICD-10-PCS | Mod: PBBFAC,,, | Performed by: FAMILY MEDICINE

## 2022-10-20 PROCEDURE — 1160F PR REVIEW ALL MEDS BY PRESCRIBER/CLIN PHARMACIST DOCUMENTED: ICD-10-PCS | Mod: CPTII,S$GLB,, | Performed by: FAMILY MEDICINE

## 2022-10-20 PROCEDURE — 3080F DIAST BP >= 90 MM HG: CPT | Mod: CPTII,S$GLB,, | Performed by: FAMILY MEDICINE

## 2022-10-20 PROCEDURE — 3075F PR MOST RECENT SYSTOLIC BLOOD PRESS GE 130-139MM HG: ICD-10-PCS | Mod: CPTII,S$GLB,, | Performed by: FAMILY MEDICINE

## 2022-10-20 PROCEDURE — 1159F MED LIST DOCD IN RCRD: CPT | Mod: CPTII,S$GLB,, | Performed by: FAMILY MEDICINE

## 2022-10-20 PROCEDURE — 3080F PR MOST RECENT DIASTOLIC BLOOD PRESSURE >= 90 MM HG: ICD-10-PCS | Mod: CPTII,S$GLB,, | Performed by: FAMILY MEDICINE

## 2022-10-20 PROCEDURE — 3075F SYST BP GE 130 - 139MM HG: CPT | Mod: CPTII,S$GLB,, | Performed by: FAMILY MEDICINE

## 2022-10-20 PROCEDURE — 1159F PR MEDICATION LIST DOCUMENTED IN MEDICAL RECORD: ICD-10-PCS | Mod: CPTII,S$GLB,, | Performed by: FAMILY MEDICINE

## 2022-10-20 PROCEDURE — 1160F RVW MEDS BY RX/DR IN RCRD: CPT | Mod: CPTII,S$GLB,, | Performed by: FAMILY MEDICINE

## 2022-10-20 RX ORDER — OMEPRAZOLE 40 MG/1
40 CAPSULE, DELAYED RELEASE ORAL DAILY
Qty: 30 CAPSULE | Refills: 2 | Status: SHIPPED | OUTPATIENT
Start: 2022-10-20 | End: 2024-03-07

## 2022-10-20 NOTE — PROGRESS NOTES
Subjective:       Patient ID: Lu Lopez is a 47 y.o. female.    Chief Complaint: Abdominal Pain    HPI    Lu Lopez is a 47 y.o. female for prob focused visit. PCP Dr. Cisneros.    See portal msgs for details. C/o stabbing pains in stomach as of 2-3 nights ago, woke up feeling nauseous and burning pain in stomach.    Has never felt before. Mid epigastric pain after meals and will also feel soreness right upper abd. Will calm down after initial onset pain with meals and then restart when tries to eat or drink again. No bm changes; no black or tarry stools. Has felt nausea intermittently.    Review of Systems   Constitutional:  Negative for chills and fever.   Respiratory:  Negative for cough and shortness of breath.    Gastrointestinal:  Positive for abdominal pain and nausea. Negative for blood in stool, constipation and diarrhea.       Past Medical History:   Diagnosis Date    Endometriosis         Prior to Admission medications    Medication Sig Start Date End Date Taking? Authorizing Provider   amitriptyline (ELAVIL) 25 MG tablet Take 1 tablet (25 mg total) by mouth every evening. 2/15/22 2/15/23 Yes Shawnee Cisneros MD   ergocalciferol, vitamin D2, (VITAMIN D ORAL) Take by mouth.   Yes Historical Provider   hydroCHLOROthiazide (HYDRODIURIL) 12.5 MG Tab TAKE 1 TABLET BY MOUTH EVERY DAY 6/18/22  Yes Shawnee Cisneros MD   norethindrone (MICRONOR) 0.35 mg tablet  1/4/20  Yes Historical Provider   norethindrone (MICRONOR) 0.35 mg tablet Take 1 tablet by mouth once daily. 12/13/21  Yes Historical Provider   ondansetron (ZOFRAN-ODT) 4 MG TbDL DISSOLVE 2 TABLETS(8 MG) ON THE TONGUE TWICE DAILY AS NEEDED 8/10/22  Yes Shawnee Cisneros MD   sumatriptan (IMITREX) 100 MG tablet TAKE 1 TABLET BY MOUTH AT ONSET OF HEADACHE. REPEAT IN 2 HOURS IF NEEDED 4/27/22  Yes Abilio Valenzuela MD   traZODone (DESYREL) 50 MG tablet TAKE 1/2 TAB TO 1 TABLET NIGHTLY BY MOUTH AS NEEDED FOR INSOMNIA 6/9/22  Yes  "Shawnee Cisneros MD        Past medical history, surgical history, and family medical history reviewed and updated as appropriate.    Medications and allergies reviewed.     Objective:          Vitals:    10/20/22 0944   BP: (!) 132/90   Pulse: 74   SpO2: 99%   Weight: 63.8 kg (140 lb 10.5 oz)   Height: 5' 7" (1.702 m)     Body mass index is 22.03 kg/m².  Physical Exam  Vitals and nursing note reviewed.   Constitutional:       General: She is not in acute distress.     Appearance: She is not ill-appearing.   Abdominal:      General: Bowel sounds are normal.      Palpations: Abdomen is soft.      Tenderness: There is abdominal tenderness in the right upper quadrant and epigastric area. Negative signs include Nuñez's sign.      Hernia: No hernia is present.      Comments: Soreness upon palpation of epigastric and RUQ, milder degree with RLQ   Neurological:      Mental Status: She is alert.       Lab Results   Component Value Date    WBC 5.89 02/16/2022    HGB 12.1 02/16/2022    HCT 37.7 02/16/2022     02/16/2022    CHOL 159 02/16/2022    TRIG 73 02/16/2022    HDL 57 02/16/2022    ALT 12 02/16/2022    AST 18 02/16/2022     02/16/2022    K 3.7 02/16/2022     02/16/2022    CREATININE 0.8 02/16/2022    BUN 19 02/16/2022    CO2 25 02/16/2022    TSH 1.673 02/16/2022    HGBA1C 5.3 01/15/2020       Assessment:       1. Dyspepsia    2. Epigastric pain          Plan:   1. Dyspepsia  -     omeprazole (PRILOSEC) 40 MG capsule; Take 1 capsule (40 mg total) by mouth once daily.  Dispense: 30 capsule; Refill: 2    2. Epigastric pain  -     US Abdomen Limited; Future; Expected date: 10/20/2022    Considering acid reflux, pud, gb dz. Will trial ppi and obtain ultrasound. Keep in touch with pcp about worsening symptoms. Outlined warning signs today to watch out for in case she needs to get into ED for emergent eval.     No follow-ups on file.    Balaji Brown MD  Family Medicine / Primary Care  [facility]  10/20/2022

## 2022-10-31 ENCOUNTER — PATIENT MESSAGE (OUTPATIENT)
Dept: INTERNAL MEDICINE | Facility: CLINIC | Age: 47
End: 2022-10-31
Payer: COMMERCIAL

## 2022-10-31 ENCOUNTER — HOSPITAL ENCOUNTER (OUTPATIENT)
Dept: RADIOLOGY | Facility: HOSPITAL | Age: 47
Discharge: HOME | End: 2022-10-31
Attending: FAMILY MEDICINE
Payer: COMMERCIAL

## 2022-10-31 DIAGNOSIS — R10.13 EPIGASTRIC PAIN: ICD-10-CM

## 2022-10-31 DIAGNOSIS — R93.2 ABNORMAL LIVER ULTRASOUND: Primary | ICD-10-CM

## 2022-10-31 PROCEDURE — 76705 ECHO EXAM OF ABDOMEN: CPT | Mod: TC

## 2022-10-31 PROCEDURE — 76705 ECHO EXAM OF ABDOMEN: CPT | Mod: 26,,, | Performed by: INTERNAL MEDICINE

## 2022-10-31 PROCEDURE — 76705 US ABDOMEN LIMITED: ICD-10-PCS | Mod: 26,,, | Performed by: INTERNAL MEDICINE

## 2022-11-02 NOTE — TELEPHONE ENCOUNTER
Called and spoke directly to pt   Reviewed US results - will get opinion from hepatology nonurgently on small lesions favoring hemangiomas - is on ocps   All questions were answered and pt verbalized understanding of plan.     Respiratory sx since Sunday - cough better   + sinus congestion   Fever 99.8 at onset and improved since then   Has not completed covid test and will do so with home test  Discussed that if negative then may be flu and would assume so in that case   Supportive care rec - outside of timeframe from tamiflu to be helpful   Er and rtc prompts given   Gi sx improved     All questions were answered and pt verbalized understanding of plan.

## 2022-11-04 ENCOUNTER — TELEPHONE (OUTPATIENT)
Dept: ORTHOPEDICS | Facility: CLINIC | Age: 47
End: 2022-11-04
Payer: COMMERCIAL

## 2023-02-02 ENCOUNTER — LAB VISIT (OUTPATIENT)
Dept: LAB | Facility: HOSPITAL | Age: 48
End: 2023-02-02
Payer: COMMERCIAL

## 2023-02-02 ENCOUNTER — OFFICE VISIT (OUTPATIENT)
Dept: HEPATOLOGY | Facility: CLINIC | Age: 48
End: 2023-02-02
Payer: COMMERCIAL

## 2023-02-02 VITALS
OXYGEN SATURATION: 98 % | HEIGHT: 67 IN | SYSTOLIC BLOOD PRESSURE: 136 MMHG | DIASTOLIC BLOOD PRESSURE: 77 MMHG | WEIGHT: 142.88 LBS | RESPIRATION RATE: 16 BRPM | HEART RATE: 94 BPM | TEMPERATURE: 97 F | BODY MASS INDEX: 22.43 KG/M2

## 2023-02-02 DIAGNOSIS — R93.2 ABNORMAL LIVER ULTRASOUND: ICD-10-CM

## 2023-02-02 LAB
AFP SERPL-MCNC: 3.1 NG/ML (ref 0–8.4)
ALBUMIN SERPL BCP-MCNC: 4.7 G/DL (ref 3.5–5.2)
ALP SERPL-CCNC: 45 U/L (ref 55–135)
ALT SERPL W/O P-5'-P-CCNC: 15 U/L (ref 10–44)
ANION GAP SERPL CALC-SCNC: 8 MMOL/L (ref 8–16)
AST SERPL-CCNC: 17 U/L (ref 10–40)
BASOPHILS # BLD AUTO: 0.04 K/UL (ref 0–0.2)
BASOPHILS NFR BLD: 0.6 % (ref 0–1.9)
BILIRUB SERPL-MCNC: 0.4 MG/DL (ref 0.1–1)
BUN SERPL-MCNC: 14 MG/DL (ref 6–20)
CALCIUM SERPL-MCNC: 10.1 MG/DL (ref 8.7–10.5)
CANCER AG19-9 SERPL-ACNC: 18.3 U/ML (ref 0–40)
CHLORIDE SERPL-SCNC: 104 MMOL/L (ref 95–110)
CO2 SERPL-SCNC: 27 MMOL/L (ref 23–29)
CREAT SERPL-MCNC: 0.8 MG/DL (ref 0.5–1.4)
DIFFERENTIAL METHOD: ABNORMAL
EOSINOPHIL # BLD AUTO: 0.1 K/UL (ref 0–0.5)
EOSINOPHIL NFR BLD: 1.3 % (ref 0–8)
ERYTHROCYTE [DISTWIDTH] IN BLOOD BY AUTOMATED COUNT: 12.6 % (ref 11.5–14.5)
EST. GFR  (NO RACE VARIABLE): >60 ML/MIN/1.73 M^2
FERRITIN SERPL-MCNC: 105 NG/ML (ref 20–300)
GLUCOSE SERPL-MCNC: 107 MG/DL (ref 70–110)
HCT VFR BLD AUTO: 39.9 % (ref 37–48.5)
HGB BLD-MCNC: 12.7 G/DL (ref 12–16)
IMM GRANULOCYTES # BLD AUTO: 0.02 K/UL (ref 0–0.04)
IMM GRANULOCYTES NFR BLD AUTO: 0.3 % (ref 0–0.5)
LYMPHOCYTES # BLD AUTO: 1.6 K/UL (ref 1–4.8)
LYMPHOCYTES NFR BLD: 22.7 % (ref 18–48)
MCH RBC QN AUTO: 27.5 PG (ref 27–31)
MCHC RBC AUTO-ENTMCNC: 31.8 G/DL (ref 32–36)
MCV RBC AUTO: 86 FL (ref 82–98)
MONOCYTES # BLD AUTO: 0.6 K/UL (ref 0.3–1)
MONOCYTES NFR BLD: 8.2 % (ref 4–15)
NEUTROPHILS # BLD AUTO: 4.6 K/UL (ref 1.8–7.7)
NEUTROPHILS NFR BLD: 66.9 % (ref 38–73)
NRBC BLD-RTO: 0 /100 WBC
PLATELET # BLD AUTO: 340 K/UL (ref 150–450)
PMV BLD AUTO: 9.7 FL (ref 9.2–12.9)
POTASSIUM SERPL-SCNC: 4.5 MMOL/L (ref 3.5–5.1)
PROT SERPL-MCNC: 8.2 G/DL (ref 6–8.4)
RBC # BLD AUTO: 4.62 M/UL (ref 4–5.4)
SODIUM SERPL-SCNC: 139 MMOL/L (ref 136–145)
WBC # BLD AUTO: 6.91 K/UL (ref 3.9–12.7)

## 2023-02-02 PROCEDURE — 99999 PR PBB SHADOW E&M-EST. PATIENT-LVL V: ICD-10-PCS | Mod: PBBFAC,,, | Performed by: INTERNAL MEDICINE

## 2023-02-02 PROCEDURE — 1159F PR MEDICATION LIST DOCUMENTED IN MEDICAL RECORD: ICD-10-PCS | Mod: CPTII,S$GLB,, | Performed by: INTERNAL MEDICINE

## 2023-02-02 PROCEDURE — 85025 COMPLETE CBC W/AUTO DIFF WBC: CPT | Performed by: INTERNAL MEDICINE

## 2023-02-02 PROCEDURE — 3078F DIAST BP <80 MM HG: CPT | Mod: CPTII,S$GLB,, | Performed by: INTERNAL MEDICINE

## 2023-02-02 PROCEDURE — 82105 ALPHA-FETOPROTEIN SERUM: CPT | Performed by: INTERNAL MEDICINE

## 2023-02-02 PROCEDURE — 3075F SYST BP GE 130 - 139MM HG: CPT | Mod: CPTII,S$GLB,, | Performed by: INTERNAL MEDICINE

## 2023-02-02 PROCEDURE — 36415 COLL VENOUS BLD VENIPUNCTURE: CPT | Performed by: INTERNAL MEDICINE

## 2023-02-02 PROCEDURE — 3008F BODY MASS INDEX DOCD: CPT | Mod: CPTII,S$GLB,, | Performed by: INTERNAL MEDICINE

## 2023-02-02 PROCEDURE — 82728 ASSAY OF FERRITIN: CPT | Performed by: INTERNAL MEDICINE

## 2023-02-02 PROCEDURE — 99204 PR OFFICE/OUTPT VISIT, NEW, LEVL IV, 45-59 MIN: ICD-10-PCS | Mod: S$GLB,,, | Performed by: INTERNAL MEDICINE

## 2023-02-02 PROCEDURE — 86301 IMMUNOASSAY TUMOR CA 19-9: CPT | Performed by: INTERNAL MEDICINE

## 2023-02-02 PROCEDURE — 80053 COMPREHEN METABOLIC PANEL: CPT | Performed by: INTERNAL MEDICINE

## 2023-02-02 PROCEDURE — 1159F MED LIST DOCD IN RCRD: CPT | Mod: CPTII,S$GLB,, | Performed by: INTERNAL MEDICINE

## 2023-02-02 PROCEDURE — 99204 OFFICE O/P NEW MOD 45 MIN: CPT | Mod: S$GLB,,, | Performed by: INTERNAL MEDICINE

## 2023-02-02 PROCEDURE — 99999 PR PBB SHADOW E&M-EST. PATIENT-LVL V: CPT | Mod: PBBFAC,,, | Performed by: INTERNAL MEDICINE

## 2023-02-02 PROCEDURE — 3008F PR BODY MASS INDEX (BMI) DOCUMENTED: ICD-10-PCS | Mod: CPTII,S$GLB,, | Performed by: INTERNAL MEDICINE

## 2023-02-02 PROCEDURE — 3075F PR MOST RECENT SYSTOLIC BLOOD PRESS GE 130-139MM HG: ICD-10-PCS | Mod: CPTII,S$GLB,, | Performed by: INTERNAL MEDICINE

## 2023-02-02 PROCEDURE — 3078F PR MOST RECENT DIASTOLIC BLOOD PRESSURE < 80 MM HG: ICD-10-PCS | Mod: CPTII,S$GLB,, | Performed by: INTERNAL MEDICINE

## 2023-02-02 RX ORDER — ACETAMINOPHEN AND CODEINE PHOSPHATE 120; 12 MG/5ML; MG/5ML
1 SOLUTION ORAL DAILY
COMMUNITY
Start: 2022-11-28

## 2023-02-02 NOTE — PROGRESS NOTES
Subjective:       Patient ID: Lu Lopez is a 47 y.o. female.    Chief Complaint: No chief complaint on file.    HPI  I saw this 47 y.o. lady who was recently investigated for RUQ pain with a liver US that showed 2 small liver lesions.    Abdo US: 10/31/22  Liver: Normal in size, measuring 16 cm. Homogeneous echotexture.  HRI: 1.3.  There are 2 well defined homogeneously hyperechoic foci within the right hepatic lobe measuring 1.1 x 0.6 x 0.6 cm and 0.4 x 0.5 x 0.5 cm, favoring hemangiomas.      Body mass index is 22.37 kg/m².    On birth control      PMH:  Migraines    SH:  3 daughters- one with type 1 DM  Alcohol x 2 per week  Non smoker      FH:  Colon cancer- grandfather  Breast Ca      Review of Systems   Constitutional:  Negative for activity change, appetite change, chills, fatigue, fever and unexpected weight change.   HENT:  Negative for ear pain, hearing loss, nosebleeds, sore throat and trouble swallowing.    Eyes:  Negative for redness and visual disturbance.   Respiratory:  Negative for cough, chest tightness, shortness of breath and wheezing.    Cardiovascular:  Negative for chest pain and palpitations.   Gastrointestinal:  Negative for abdominal distention, abdominal pain, blood in stool, constipation, diarrhea, nausea and vomiting.   Genitourinary:  Negative for difficulty urinating, dysuria, frequency, hematuria and urgency.   Musculoskeletal:  Negative for arthralgias, back pain, gait problem, joint swelling and myalgias.   Skin:  Negative for rash.   Neurological:  Negative for tremors, seizures, speech difficulty, weakness and headaches.   Hematological:  Negative for adenopathy.   Psychiatric/Behavioral:  Negative for confusion, decreased concentration and sleep disturbance. The patient is not nervous/anxious.        Lab Results   Component Value Date    ALT 12 02/16/2022    AST 18 02/16/2022    ALKPHOS 39 (L) 02/16/2022    BILITOT 0.5 02/16/2022     Past Medical History:   Diagnosis Date     Endometriosis      Past Surgical History:   Procedure Laterality Date    DILATION AND CURETTAGE OF UTERUS      PELVIC LAPAROSCOPY       Current Outpatient Medications   Medication Sig    amitriptyline (ELAVIL) 25 MG tablet Take 1 tablet (25 mg total) by mouth every evening.    ergocalciferol, vitamin D2, (VITAMIN D ORAL) Take by mouth.    hydroCHLOROthiazide (HYDRODIURIL) 12.5 MG Tab TAKE 1 TABLET BY MOUTH EVERY DAY    norethindrone (MICRONOR) 0.35 mg tablet     norethindrone (MICRONOR) 0.35 mg tablet Take 1 tablet by mouth once daily.    omeprazole (PRILOSEC) 40 MG capsule Take 1 capsule (40 mg total) by mouth once daily.    ondansetron (ZOFRAN-ODT) 4 MG TbDL DISSOLVE 2 TABLETS(8 MG) ON THE TONGUE TWICE DAILY AS NEEDED    sumatriptan (IMITREX) 100 MG tablet TAKE 1 TABLET BY MOUTH AT ONSET OF HEADACHE. REPEAT IN 2 HOURS IF NEEDED    traZODone (DESYREL) 50 MG tablet TAKE 1/2 TAB TO 1 TABLET NIGHTLY BY MOUTH AS NEEDED FOR INSOMNIA     No current facility-administered medications for this visit.       Objective:      Physical Exam  Constitutional:       General: She is not in acute distress.  HENT:      Head: Normocephalic.   Eyes:      Pupils: Pupils are equal, round, and reactive to light.   Neck:      Thyroid: No thyromegaly.      Vascular: No JVD.      Trachea: No tracheal deviation.   Cardiovascular:      Rate and Rhythm: Normal rate and regular rhythm.      Heart sounds: Normal heart sounds. No murmur heard.  Pulmonary:      Effort: Pulmonary effort is normal.      Breath sounds: Normal breath sounds. No stridor.   Abdominal:      Palpations: Abdomen is soft.   Lymphadenopathy:      Head:      Right side of head: No submental, submandibular, tonsillar, preauricular, posterior auricular or occipital adenopathy.      Left side of head: No submental, submandibular, tonsillar, preauricular, posterior auricular or occipital adenopathy.      Cervical: No cervical adenopathy.   Neurological:      Mental Status: She  is alert. She is not disoriented.      Cranial Nerves: No cranial nerve deficit.      Sensory: No sensory deficit.         Assessment:       1. Abnormal liver ultrasound      Plan:   Likely has 2 small benign liver lesions.  - labs today and MRI abdo    I will let her know after these tests if we need to see her back in clinic.

## 2023-02-03 ENCOUNTER — PATIENT MESSAGE (OUTPATIENT)
Dept: HEPATOLOGY | Facility: CLINIC | Age: 48
End: 2023-02-03
Payer: COMMERCIAL

## 2023-02-03 ENCOUNTER — PATIENT MESSAGE (OUTPATIENT)
Dept: TRANSPLANT | Facility: CLINIC | Age: 48
End: 2023-02-03
Payer: COMMERCIAL

## 2023-02-19 DIAGNOSIS — G43.001 MIGRAINE WITHOUT AURA AND WITH STATUS MIGRAINOSUS, NOT INTRACTABLE: ICD-10-CM

## 2023-02-19 DIAGNOSIS — I10 HYPERTENSION, BENIGN: ICD-10-CM

## 2023-02-20 RX ORDER — AMITRIPTYLINE HYDROCHLORIDE 25 MG/1
25 TABLET, FILM COATED ORAL NIGHTLY
Qty: 90 TABLET | Refills: 3 | Status: SHIPPED | OUTPATIENT
Start: 2023-02-20 | End: 2023-04-21

## 2023-02-20 RX ORDER — HYDROCHLOROTHIAZIDE 12.5 MG/1
12.5 TABLET ORAL DAILY
Qty: 90 TABLET | Refills: 2 | Status: SHIPPED | OUTPATIENT
Start: 2023-02-20 | End: 2023-12-12 | Stop reason: SDUPTHER

## 2023-02-20 NOTE — TELEPHONE ENCOUNTER
No new care gaps identified.  [facility] Embedded Care Gaps. Reference number: [id number]. 2/19/2023   8:20:34 PM CST

## 2023-02-20 NOTE — TELEPHONE ENCOUNTER
No new care gaps identified.  [facility] Embedded Care Gaps. Reference number: [id number]. 2/19/2023   8:18:43 PM CST

## 2023-02-23 ENCOUNTER — PATIENT MESSAGE (OUTPATIENT)
Dept: HEPATOLOGY | Facility: CLINIC | Age: 48
End: 2023-02-23
Payer: COMMERCIAL

## 2023-04-13 ENCOUNTER — PATIENT MESSAGE (OUTPATIENT)
Dept: INFECTIOUS DISEASES | Facility: CLINIC | Age: 48
End: 2023-04-13
Payer: COMMERCIAL

## 2023-04-21 ENCOUNTER — PATIENT MESSAGE (OUTPATIENT)
Dept: INTERNAL MEDICINE | Facility: CLINIC | Age: 48
End: 2023-04-21

## 2023-04-21 ENCOUNTER — LAB VISIT (OUTPATIENT)
Dept: LAB | Facility: OTHER | Age: 48
End: 2023-04-21
Attending: INTERNAL MEDICINE
Payer: COMMERCIAL

## 2023-04-21 ENCOUNTER — OFFICE VISIT (OUTPATIENT)
Dept: INTERNAL MEDICINE | Facility: CLINIC | Age: 48
End: 2023-04-21
Attending: INTERNAL MEDICINE
Payer: COMMERCIAL

## 2023-04-21 VITALS
DIASTOLIC BLOOD PRESSURE: 78 MMHG | HEIGHT: 67 IN | BODY MASS INDEX: 22.59 KG/M2 | HEART RATE: 88 BPM | SYSTOLIC BLOOD PRESSURE: 126 MMHG | WEIGHT: 143.94 LBS | OXYGEN SATURATION: 99 %

## 2023-04-21 DIAGNOSIS — F43.23 ADJUSTMENT DISORDER WITH MIXED ANXIETY AND DEPRESSED MOOD: ICD-10-CM

## 2023-04-21 DIAGNOSIS — F41.1 GAD (GENERALIZED ANXIETY DISORDER): ICD-10-CM

## 2023-04-21 DIAGNOSIS — Z00.00 ANNUAL PHYSICAL EXAM: Primary | ICD-10-CM

## 2023-04-21 DIAGNOSIS — Z00.00 ANNUAL PHYSICAL EXAM: ICD-10-CM

## 2023-04-21 DIAGNOSIS — G47.00 INSOMNIA, UNSPECIFIED TYPE: ICD-10-CM

## 2023-04-21 DIAGNOSIS — I10 HTN (HYPERTENSION), BENIGN: ICD-10-CM

## 2023-04-21 DIAGNOSIS — G43.009 MIGRAINE WITHOUT AURA AND WITHOUT STATUS MIGRAINOSUS, NOT INTRACTABLE: ICD-10-CM

## 2023-04-21 LAB
CHOLEST SERPL-MCNC: 185 MG/DL (ref 120–199)
CHOLEST/HDLC SERPL: 2.6 {RATIO} (ref 2–5)
ESTIMATED AVG GLUCOSE: 100 MG/DL (ref 68–131)
HBA1C MFR BLD: 5.1 % (ref 4–5.6)
HDLC SERPL-MCNC: 70 MG/DL (ref 40–75)
HDLC SERPL: 37.8 % (ref 20–50)
LDLC SERPL CALC-MCNC: 105.8 MG/DL (ref 63–159)
NONHDLC SERPL-MCNC: 115 MG/DL
TRIGL SERPL-MCNC: 46 MG/DL (ref 30–150)
TSH SERPL DL<=0.005 MIU/L-ACNC: 1.02 UIU/ML (ref 0.4–4)

## 2023-04-21 PROCEDURE — 3044F PR MOST RECENT HEMOGLOBIN A1C LEVEL <7.0%: ICD-10-PCS | Mod: CPTII,S$GLB,, | Performed by: INTERNAL MEDICINE

## 2023-04-21 PROCEDURE — 99396 PREV VISIT EST AGE 40-64: CPT | Mod: S$GLB,,, | Performed by: INTERNAL MEDICINE

## 2023-04-21 PROCEDURE — 99999 PR PBB SHADOW E&M-EST. PATIENT-LVL IV: CPT | Mod: PBBFAC,,, | Performed by: INTERNAL MEDICINE

## 2023-04-21 PROCEDURE — 80061 LIPID PANEL: CPT | Performed by: INTERNAL MEDICINE

## 2023-04-21 PROCEDURE — 3044F HG A1C LEVEL LT 7.0%: CPT | Mod: CPTII,S$GLB,, | Performed by: INTERNAL MEDICINE

## 2023-04-21 PROCEDURE — 3074F SYST BP LT 130 MM HG: CPT | Mod: CPTII,S$GLB,, | Performed by: INTERNAL MEDICINE

## 2023-04-21 PROCEDURE — 3008F BODY MASS INDEX DOCD: CPT | Mod: CPTII,S$GLB,, | Performed by: INTERNAL MEDICINE

## 2023-04-21 PROCEDURE — 99999 PR PBB SHADOW E&M-EST. PATIENT-LVL IV: ICD-10-PCS | Mod: PBBFAC,,, | Performed by: INTERNAL MEDICINE

## 2023-04-21 PROCEDURE — 84443 ASSAY THYROID STIM HORMONE: CPT | Performed by: INTERNAL MEDICINE

## 2023-04-21 PROCEDURE — 3074F PR MOST RECENT SYSTOLIC BLOOD PRESSURE < 130 MM HG: ICD-10-PCS | Mod: CPTII,S$GLB,, | Performed by: INTERNAL MEDICINE

## 2023-04-21 PROCEDURE — 3008F PR BODY MASS INDEX (BMI) DOCUMENTED: ICD-10-PCS | Mod: CPTII,S$GLB,, | Performed by: INTERNAL MEDICINE

## 2023-04-21 PROCEDURE — 3078F PR MOST RECENT DIASTOLIC BLOOD PRESSURE < 80 MM HG: ICD-10-PCS | Mod: CPTII,S$GLB,, | Performed by: INTERNAL MEDICINE

## 2023-04-21 PROCEDURE — 3078F DIAST BP <80 MM HG: CPT | Mod: CPTII,S$GLB,, | Performed by: INTERNAL MEDICINE

## 2023-04-21 PROCEDURE — 1159F PR MEDICATION LIST DOCUMENTED IN MEDICAL RECORD: ICD-10-PCS | Mod: CPTII,S$GLB,, | Performed by: INTERNAL MEDICINE

## 2023-04-21 PROCEDURE — 1160F RVW MEDS BY RX/DR IN RCRD: CPT | Mod: CPTII,S$GLB,, | Performed by: INTERNAL MEDICINE

## 2023-04-21 PROCEDURE — 1160F PR REVIEW ALL MEDS BY PRESCRIBER/CLIN PHARMACIST DOCUMENTED: ICD-10-PCS | Mod: CPTII,S$GLB,, | Performed by: INTERNAL MEDICINE

## 2023-04-21 PROCEDURE — 1159F MED LIST DOCD IN RCRD: CPT | Mod: CPTII,S$GLB,, | Performed by: INTERNAL MEDICINE

## 2023-04-21 PROCEDURE — 99396 PR PREVENTIVE VISIT,EST,40-64: ICD-10-PCS | Mod: S$GLB,,, | Performed by: INTERNAL MEDICINE

## 2023-04-21 PROCEDURE — 83036 HEMOGLOBIN GLYCOSYLATED A1C: CPT | Performed by: INTERNAL MEDICINE

## 2023-04-21 PROCEDURE — 36415 COLL VENOUS BLD VENIPUNCTURE: CPT | Performed by: INTERNAL MEDICINE

## 2023-04-21 RX ORDER — SUMATRIPTAN SUCCINATE 100 MG/1
TABLET ORAL
Qty: 18 TABLET | Refills: 11 | Status: SHIPPED | OUTPATIENT
Start: 2023-04-21 | End: 2024-03-07 | Stop reason: SDUPTHER

## 2023-04-21 RX ORDER — SERTRALINE HYDROCHLORIDE 25 MG/1
TABLET, FILM COATED ORAL
Qty: 60 TABLET | Refills: 2 | Status: SHIPPED | OUTPATIENT
Start: 2023-04-21 | End: 2023-05-18

## 2023-04-21 NOTE — PROGRESS NOTES
"Subjective:   Patient ID: Lu Lopez is a 47 y.o. female  Chief complaint:   Chief Complaint   Patient presents with    Annual Exam       HPI    Here for annual exam   Exercising consistently     Overwhelmed at times - Everything feels harder   Exercising 3 days per week - cardio and yoga   Feel in a rut  Mom not doing well medically - she is her caregiver   Not in counseling   Daughter is on zoloft and was on prozac for 3 years and was not helping   - no si/hi/snider  Anxiety:  - too flat on lexapro in past - manageable at this time     Migraines:   - improved after switching ocps   - seen by neuro and now on elavil 50mg and will only get migraine if very stressed or after a few days of poor sleep   - migraines are mild at this time     HTN:   - did get another job after lost prior job from Covid   - bp at goal   - in dig htn program  - bianca med on med card     Vit d def:  - c/w otc vit d suppl    Abn liver us:   Seen by hep 2/2/203  To have MRI     High risk for breast cancer:   - MRI breast completed recently at [facility]   Studies ordered through gyn at [facility]  Prev:   mmg ordered through gyn at [facility] 6/2021 and breast MRI 12/2021 - [facility]    Insomnia:  - has periods when sx wax and wane - trazodone helpful prn  Previously:   Taking elavil 25mg and not helpful for sleep   Tried nyquil, benadryl, melatonin and ineffective   - exercising as above     Jennifer:   Mild - stopped oral iron at end of last year due to constipation even with colace   - periods intermittent - spotting mild     HM:  Pap  Covid booster   Flu vaccine     Gyn: [facility] 1/2023  Hep:     Review of Systems    Objective:  Vitals:    04/21/23 0827   BP: 126/78   Pulse: 88   SpO2: 99%   Weight: 65.3 kg (143 lb 15.4 oz)   Height: 5' 7" (1.702 m)     Body mass index is 22.55 kg/m².    Physical Exam  Vitals reviewed.   Constitutional:       Appearance: Normal appearance. She is well-developed.   HENT:      Head: Normocephalic and atraumatic.      Right Ear: " Tympanic membrane, ear canal and external ear normal.      Left Ear: Tympanic membrane, ear canal and external ear normal.      Nose: Nose normal. No congestion.      Mouth/Throat:      Mouth: Mucous membranes are moist.      Pharynx: Oropharynx is clear. No oropharyngeal exudate.   Eyes:      Extraocular Movements: Extraocular movements intact.      Conjunctiva/sclera: Conjunctivae normal.   Neck:      Thyroid: No thyromegaly.   Cardiovascular:      Rate and Rhythm: Normal rate and regular rhythm.      Pulses: Normal pulses.      Heart sounds: Normal heart sounds.   Pulmonary:      Effort: Pulmonary effort is normal.      Breath sounds: Normal breath sounds.   Abdominal:      General: Bowel sounds are normal.      Palpations: Abdomen is soft.   Musculoskeletal:         General: No swelling or tenderness.      Cervical back: Neck supple.   Lymphadenopathy:      Cervical: No cervical adenopathy.   Skin:     General: Skin is warm and dry.      Capillary Refill: Capillary refill takes less than 2 seconds.   Neurological:      General: No focal deficit present.      Mental Status: She is alert and oriented to person, place, and time. Mental status is at baseline.   Psychiatric:         Behavior: Behavior normal.         Thought Content: Thought content normal.       Assessment:  1. Annual physical exam    2. HTN (hypertension), benign    3. Migraine without aura and without status migrainosus, not intractable    4. PUMA (generalized anxiety disorder)    5. Insomnia, unspecified type    6. Adjustment disorder with mixed anxiety and depressed mood        Plan:  Lu was seen today for annual exam.    Diagnoses and all orders for this visit:    Annual physical exam  -     Hemoglobin A1C; Future  -     Lipid Panel; Future  -     TSH; Future  Recommend daily sunscreen, cardiovascular exercise min 30 min 5 days per week. Seatbelts routinely.    HTN (hypertension), benign  Stable   Cont med     Migraine without aura and  without status migrainosus, not intractable  -     sumatriptan (IMITREX) 100 MG tablet; Do not exceed max 200mg/24h  Stable   Prn refills given     PUMA (generalized anxiety disorder)  -     Ambulatory referral/consult to Primary Care Behavioral Health (Non-Opioids); Future  -     sertraline (ZOLOFT) 25 MG tablet; Take 25mg by mouth nightly for 2 weeks and then increase to 50mg  Start counseling and trial of zoloft   Potential side effects of medication discussed with patient. If the patient has any issues with medication, patient  understands to let me know. Return to clinic and ER prompts given.   Stop elavil     Insomnia, unspecified type  Cont trazodone   Stop elavil   Start zoloft to address anxiety    Adjustment disorder with mixed anxiety and depressed mood  As above    Health Maintenance   Topic Date Due    Mammogram  07/21/2023    Lipid Panel  04/21/2028    TETANUS VACCINE  01/02/2029    Abdominal Aortic Aneurysm Screening  07/02/2041    Hepatitis C Screening  Completed

## 2023-04-24 ENCOUNTER — PATIENT MESSAGE (OUTPATIENT)
Dept: INFECTIOUS DISEASES | Facility: CLINIC | Age: 48
End: 2023-04-24
Payer: COMMERCIAL

## 2023-04-30 PROBLEM — F43.23 ADJUSTMENT DISORDER WITH MIXED ANXIETY AND DEPRESSED MOOD: Status: ACTIVE | Noted: 2023-04-30

## 2023-05-03 ENCOUNTER — PATIENT MESSAGE (OUTPATIENT)
Dept: BEHAVIORAL HEALTH | Facility: CLINIC | Age: 48
End: 2023-05-03
Payer: COMMERCIAL

## 2023-05-03 ENCOUNTER — TELEPHONE (OUTPATIENT)
Dept: BEHAVIORAL HEALTH | Facility: CLINIC | Age: 48
End: 2023-05-03
Payer: COMMERCIAL

## 2023-05-03 NOTE — PROGRESS NOTES
Contacted patient no answer left VM.  Sent assessments via patient portal with request for pt to please call.

## 2023-05-10 ENCOUNTER — PATIENT MESSAGE (OUTPATIENT)
Dept: BEHAVIORAL HEALTH | Facility: CLINIC | Age: 48
End: 2023-05-10
Payer: COMMERCIAL

## 2023-05-11 ENCOUNTER — TELEPHONE (OUTPATIENT)
Dept: BEHAVIORAL HEALTH | Facility: CLINIC | Age: 48
End: 2023-05-11
Payer: COMMERCIAL

## 2023-05-11 ENCOUNTER — PATIENT MESSAGE (OUTPATIENT)
Dept: BEHAVIORAL HEALTH | Facility: CLINIC | Age: 48
End: 2023-05-11
Payer: COMMERCIAL

## 2023-05-11 NOTE — PROGRESS NOTES
Behavioral Health Community Health Worker  Initial Assessment  Completed by:  Linda Weiss    Date:  5/11/2023    Patient Enrollment in Behavioral Health Program:  Patient verbalized understanding of Behavioral Health Integration services to include:  Patient understands that CHW, LCSW, PharmD and consulting Psychiatrist are members of the care team working collaboratively with his/her primary care provider: Yes  Patient understands that activation of their [facility] patient portal account is required for accessing the full scope of team services: Yes  Patient understands that some counseling sessions may occur via video: Yes  Clinic visits with the psychiatrist may be subject to a co-pay based on your insurance: Yes  Patient consents to enroll in BHI program: Yes    Assessments     Single Item Health Literacy Scale:  How often do you need to have someone help you read instructions, pamphlets or other written material from your doctor or pharmacy?: Never    Promis 10:  Promis 10 Responses  In general, would you say your health is: Very good  In general, would you say your quality of life is: Very good  In general, how would you rate your physical health?: Very good  In general, how would you rate your mental health, including your mood and your ability to think?: Good  In general, how would you rate your satisfaction with your social activities and relationships?: Very good  In general, please rate how well you carry out your usual social activities and roles. (This includes activities at home, at work and in your community, and responsibilities as a parent, child, spouse, employee, friend, etc.): Very good  To what extent are you able to carry out your everyday physical activities such as walking, climbing stairs, carrying groceries, or moving a chair? : Completely  How often have you been bothered by emotional problems such as feeling anxious, depressed or irritable?: Often  In the past 7 days, how would you rate  your fatigue on average?: Moderate  In the past 7 days, on a scale of 0 to 10 (where 0 is no pain and 10 is the worst pain imaginable) how would you rate your pain on average?: 3  Global Physical Health: 15  Global Mental health Score: 15    Depression PHQ8 on 2023 = 3  No flowsheet data found.      Generalized Anxiety Disorder 7-Item Scale:  GAD7 2023   1. Feeling nervous, anxious, or on edge? 1   2. Not being able to stop or control worrying? 1   3. Worrying too much about different things? 1   4. Trouble relaxing? 1   5. Being so restless that it is hard to sit still? 1   6. Becoming easily annoyed or irritable? 1   7. Feeling afraid as if something awful might happen? 0   PUMA-7 Score 6       History     Social History     Socioeconomic History    Marital status:    Occupational History    Occupation: fundraising for diabetes    Tobacco Use    Smoking status: Former     Packs/day: 0.00     Years: 0.00     Pack years: 0.00     Types: Cigarettes     Quit date: 10/20/1995     Years since quittin.5     Passive exposure: Past    Smokeless tobacco: Never    Tobacco comments:     social tob in college x couple of years   Substance and Sexual Activity    Alcohol use: Yes     Alcohol/week: 3.0 - 4.0 standard drinks     Types: 3 - 4 Drinks containing 0.5 oz of alcohol per week     Comment: socially    Drug use: No    Sexual activity: Yes     Partners: Male     Comment: Birth Control Pills     Social Determinants of Health     Financial Resource Strain: Low Risk     Difficulty of Paying Living Expenses: Not hard at all   Food Insecurity: No Food Insecurity    Worried About Running Out of Food in the Last Year: Never true    Ran Out of Food in the Last Year: Never true   Transportation Needs: No Transportation Needs    Lack of Transportation (Medical): No    Lack of Transportation (Non-Medical): No   Physical Activity: Insufficiently Active    Days of Exercise per Week: 2 days    Minutes of Exercise per  "Session: 40 min   Stress: Stress Concern Present    Feeling of Stress : Rather much   Social Connections: Unknown    Frequency of Communication with Friends and Family: More than three times a week    Frequency of Social Gatherings with Friends and Family: Twice a week    Active Member of Clubs or Organizations: No    Attends Club or Organization Meetings: Never    Marital Status:    Housing Stability: Low Risk     Unable to Pay for Housing in the Last Year: No    Number of Places Lived in the Last Year: 1    Unstable Housing in the Last Year: No       Call Summary     Patient was referred to the BHI (Non-opioid) program by Primary Care Provider, Dr. Shawnee Cisneros. CHW contacted Lu Lopez who reports depression and anxiety.  Patient scored "3" on the PHQ8 and "6" on the PUMA 7. Based on these scores patient is eligible for the Behavioral Health Integration (Non-opioid) Program. CHW completed the intake and scheduled an office appointment for patient with Frances Cottrell LCSW, on 6/13/2023 at 8:45am.           "

## 2023-05-18 ENCOUNTER — OFFICE VISIT (OUTPATIENT)
Dept: INTERNAL MEDICINE | Facility: CLINIC | Age: 48
End: 2023-05-18
Attending: INTERNAL MEDICINE
Payer: COMMERCIAL

## 2023-05-18 DIAGNOSIS — R11.0 NAUSEA: Primary | ICD-10-CM

## 2023-05-18 DIAGNOSIS — F41.1 GAD (GENERALIZED ANXIETY DISORDER): ICD-10-CM

## 2023-05-18 DIAGNOSIS — G47.00 INSOMNIA, UNSPECIFIED TYPE: ICD-10-CM

## 2023-05-18 DIAGNOSIS — Z51.81 MEDICATION MONITORING ENCOUNTER: ICD-10-CM

## 2023-05-18 PROCEDURE — 3044F HG A1C LEVEL LT 7.0%: CPT | Mod: CPTII,95,, | Performed by: INTERNAL MEDICINE

## 2023-05-18 PROCEDURE — 1160F RVW MEDS BY RX/DR IN RCRD: CPT | Mod: CPTII,95,, | Performed by: INTERNAL MEDICINE

## 2023-05-18 PROCEDURE — 99214 OFFICE O/P EST MOD 30 MIN: CPT | Mod: 95,,, | Performed by: INTERNAL MEDICINE

## 2023-05-18 PROCEDURE — 3044F PR MOST RECENT HEMOGLOBIN A1C LEVEL <7.0%: ICD-10-PCS | Mod: CPTII,95,, | Performed by: INTERNAL MEDICINE

## 2023-05-18 PROCEDURE — 1160F PR REVIEW ALL MEDS BY PRESCRIBER/CLIN PHARMACIST DOCUMENTED: ICD-10-PCS | Mod: CPTII,95,, | Performed by: INTERNAL MEDICINE

## 2023-05-18 PROCEDURE — 1159F PR MEDICATION LIST DOCUMENTED IN MEDICAL RECORD: ICD-10-PCS | Mod: CPTII,95,, | Performed by: INTERNAL MEDICINE

## 2023-05-18 PROCEDURE — 99214 PR OFFICE/OUTPT VISIT, EST, LEVL IV, 30-39 MIN: ICD-10-PCS | Mod: 95,,, | Performed by: INTERNAL MEDICINE

## 2023-05-18 PROCEDURE — 1159F MED LIST DOCD IN RCRD: CPT | Mod: CPTII,95,, | Performed by: INTERNAL MEDICINE

## 2023-05-18 RX ORDER — ONDANSETRON 4 MG/1
8 TABLET, ORALLY DISINTEGRATING ORAL EVERY 12 HOURS PRN
Qty: 30 TABLET | Refills: 1 | Status: SHIPPED | OUTPATIENT
Start: 2023-05-18 | End: 2024-03-07 | Stop reason: SDUPTHER

## 2023-05-18 RX ORDER — TRAZODONE HYDROCHLORIDE 100 MG/1
100 TABLET ORAL NIGHTLY PRN
Qty: 90 TABLET | Refills: 3 | Status: SHIPPED | OUTPATIENT
Start: 2023-05-18

## 2023-05-18 RX ORDER — SERTRALINE HYDROCHLORIDE 50 MG/1
50 TABLET, FILM COATED ORAL DAILY
Qty: 90 TABLET | Refills: 3 | Status: SHIPPED | OUTPATIENT
Start: 2023-05-18 | End: 2023-09-06

## 2023-05-18 NOTE — PROGRESS NOTES
The patient location is: [place]  The chief complaint leading to consultation is: medication follow up, symptom recheck, anxiety, insomnia    Visit type: audiovisual    Face to Face time with patient: 12 min   14 minutes of total time spent on the encounter, which includes face to face time and non-face to face time preparing to see the patient (eg, review of tests), Obtaining and/or reviewing separately obtained history, Documenting clinical information in the electronic or other health record, Independently interpreting results (not separately reported) and communicating results to the patient/family/caregiver, or Care coordination (not separately reported).         Each patient to whom he or she provides medical services by telemedicine is:  (1) informed of the relationship between the physician and patient and the respective role of any other health care provider with respect to management of the patient; and (2) notified that he or she may decline to receive medical services by telemedicine and may withdraw from such care at any time.    Notes: Subjective:   Patient ID: Lu Lopez is a 47 y.o. female  Chief complaint:   Chief Complaint   Patient presents with    Anxiety     F/u       HPI  Here for 4 week med check   Started on zoloft at [facility]   Better but still ruminating at night if awakens  Poor sleep since stopping the elavil   Prev:   Overwhelmed at times - Everything feels harder   Exercising 3 days per week - cardio and yoga   Feel in a rut  Mom not doing well medically - she is her caregiver   Not in counseling   Daughter is on zoloft and was on prozac for 3 years and was not helping   - no si/hi/snider  Anxiety:  - too flat on lexapro in past - manageable at this time     2 days of no nausea which started when started zoloft    Review of Systems   Constitutional:  Negative for activity change and unexpected weight change.   HENT:  Negative for hearing loss, rhinorrhea and trouble swallowing.     Eyes:  Negative for discharge and visual disturbance.   Respiratory:  Negative for chest tightness and wheezing.    Cardiovascular:  Negative for chest pain and palpitations.   Gastrointestinal:  Negative for blood in stool, constipation, diarrhea and vomiting.   Endocrine: Negative for polydipsia and polyuria.   Genitourinary:  Negative for difficulty urinating, dysuria, hematuria and menstrual problem.   Musculoskeletal:  Negative for arthralgias, joint swelling and neck pain.   Neurological:  Negative for weakness and headaches.   Psychiatric/Behavioral:  Negative for confusion and dysphoric mood.      Objective:  There were no vitals filed for this visit.  There is no height or weight on file to calculate BMI.    Physical Exam  Constitutional:       General: She is not in acute distress.     Appearance: She is well-developed. She is not diaphoretic.   Eyes:      General:         Right eye: No discharge.         Left eye: No discharge.      Conjunctiva/sclera: Conjunctivae normal.   Pulmonary:      Effort: Pulmonary effort is normal. No respiratory distress.   Skin:     Findings: No erythema or rash.   Neurological:      Mental Status: She is alert and oriented to person, place, and time.   Psychiatric:         Behavior: Behavior normal.         Thought Content: Thought content normal.         Judgment: Judgment normal.       Assessment:  1. Nausea    2. Insomnia, unspecified type    3. PUMA (generalized anxiety disorder)    4. Medication monitoring encounter        Plan:  1. Nausea  -     ondansetron (ZOFRAN-ODT) 4 MG TbDL; Take 2 tablets (8 mg total) by mouth every 12 (twelve) hours as needed (nausea).  Dispense: 30 tablet; Refill: 1    2. Insomnia, unspecified type  -     traZODone (DESYREL) 100 MG tablet; Take 1 tablet (100 mg total) by mouth nightly as needed for Insomnia.  Dispense: 90 tablet; Refill: 3    3. PUMA (generalized anxiety disorder)  -     sertraline (ZOLOFT) 50 MG tablet; Take 1 tablet (50 mg  total) by mouth once daily. Take 25mg by mouth nightly for 2 weeks and then increase to 50mg  Dispense: 90 tablet; Refill: 3    4. Medication monitoring encounter      Inc trazodone to 100mg - continue zoloft as nausea subsided over past few days - can use zofran prn nausea - however if returns/worsens will plan to switch to prozac below   2.   Continue zoloft for now - nausea resolved x 2 days - will continue for now   -  Can consider prozac, celexa, or pristiq in future   - will avoid lexapro as made her feel too flat in past   If any issues, new concerns then will elt me know     Health Maintenance   Topic Date Due    Mammogram  07/21/2023    Lipid Panel  04/21/2028    TETANUS VACCINE  01/02/2029    Hepatitis C Screening  Completed

## 2023-06-13 ENCOUNTER — PATIENT MESSAGE (OUTPATIENT)
Dept: BEHAVIORAL HEALTH | Facility: CLINIC | Age: 48
End: 2023-06-13
Payer: COMMERCIAL

## 2023-06-13 ENCOUNTER — OFFICE VISIT (OUTPATIENT)
Dept: BEHAVIORAL HEALTH | Facility: CLINIC | Age: 48
End: 2023-06-13
Payer: COMMERCIAL

## 2023-06-13 DIAGNOSIS — F41.1 GAD (GENERALIZED ANXIETY DISORDER): ICD-10-CM

## 2023-06-13 DIAGNOSIS — G47.00 INSOMNIA, UNSPECIFIED TYPE: Primary | ICD-10-CM

## 2023-06-13 PROCEDURE — 3044F PR MOST RECENT HEMOGLOBIN A1C LEVEL <7.0%: ICD-10-PCS | Mod: CPTII,S$GLB,, | Performed by: SOCIAL WORKER

## 2023-06-13 PROCEDURE — 99999 PR PBB SHADOW E&M-EST. PATIENT-LVL I: CPT | Mod: PBBFAC,,, | Performed by: SOCIAL WORKER

## 2023-06-13 PROCEDURE — 90791 PSYCH DIAGNOSTIC EVALUATION: CPT | Mod: S$GLB,,, | Performed by: SOCIAL WORKER

## 2023-06-13 PROCEDURE — 3044F HG A1C LEVEL LT 7.0%: CPT | Mod: CPTII,S$GLB,, | Performed by: SOCIAL WORKER

## 2023-06-13 PROCEDURE — 90791 PR PSYCHIATRIC DIAGNOSTIC EVALUATION: ICD-10-PCS | Mod: S$GLB,,, | Performed by: SOCIAL WORKER

## 2023-06-13 PROCEDURE — 99999 PR PBB SHADOW E&M-EST. PATIENT-LVL I: ICD-10-PCS | Mod: PBBFAC,,, | Performed by: SOCIAL WORKER

## 2023-06-13 NOTE — PROGRESS NOTES
Primary Care Behavioral Health Integration: Initial  Date:  2023  Referral Source:  Shawnee Cisneros MD  Type of Visit:  In person  Length of Appointment: 45    .  History of Present Illness:  Lu Lopez, a 47 y.o. female with history of Adjustment disorders; with mixed emotional features [F43.23] and Anxiety disorders; generalized anxiety disorder [F41.1] referred by Shawnee Cisneros MD.  Patient was seen, examined and chart was reviewed.    Met with patient. PT mom passed away 3 weeks ago and the  was yesterday. PT mom fell 3 years ago and has 3 back surgeries. PT has 3 daughters and her middle daughter has anxiety and depression and struggled with alcohol addiction like PT mom did before passing away. PT stated there things constantly going on and she began feeling overwhelmed and cant catch a break. PT has 3 other siblings but felt the majority of  planning was left up to her.    Current symptoms:  Depression: dysphoric mood, insomnia, fatigue, and decreased appetite.  Anxiety: restlessness.  Sleep: frequent night time awakening.  Jacki:  denies.  Psychosis: denies .    No flowsheet data found.  GAD7 2023   1. Feeling nervous, anxious, or on edge? 1   2. Not being able to stop or control worrying? 1   3. Worrying too much about different things? 1   4. Trouble relaxing? 1   5. Being so restless that it is hard to sit still? 1   6. Becoming easily annoyed or irritable? 0   7. Feeling afraid as if something awful might happen? 0   PUMA-7 Score 5       Risk assessment:  Patient reports no suicidal ideation  Patient reports no homicidal ideation  Patient reports no self-injurious behavior  Patient reports no violent behavior    Past Medical History:   Diagnosis Date    Endometriosis          Current Outpatient Medications:     ergocalciferol, vitamin D2, (VITAMIN D ORAL), Take by mouth., Disp: , Rfl:     hydroCHLOROthiazide (HYDRODIURIL) 12.5 MG Tab, Take 1 tablet (12.5 mg total)  by mouth once daily., Disp: 90 tablet, Rfl: 2    norethindrone (MICRONOR) 0.35 mg tablet, , Disp: , Rfl:     norethindrone (MICRONOR) 0.35 mg tablet, Take 1 tablet by mouth once daily., Disp: , Rfl:     norethindrone (MICRONOR) 0.35 mg tablet, Take 1 tablet by mouth once daily., Disp: , Rfl:     omeprazole (PRILOSEC) 40 MG capsule, Take 1 capsule (40 mg total) by mouth once daily. (Patient not taking: Reported on 5/18/2023), Disp: 30 capsule, Rfl: 2    ondansetron (ZOFRAN-ODT) 4 MG TbDL, Take 2 tablets (8 mg total) by mouth every 12 (twelve) hours as needed (nausea)., Disp: 30 tablet, Rfl: 1    sertraline (ZOLOFT) 50 MG tablet, Take 1 tablet (50 mg total) by mouth once daily. Take 25mg by mouth nightly for 2 weeks and then increase to 50mg, Disp: 90 tablet, Rfl: 3    sumatriptan (IMITREX) 100 MG tablet, Do not exceed max 200mg/24h, Disp: 18 tablet, Rfl: 11    traZODone (DESYREL) 100 MG tablet, Take 1 tablet (100 mg total) by mouth nightly as needed for Insomnia., Disp: 90 tablet, Rfl: 3    Psychiatric History:  Psychiatric Diagnosis:  Pt is taking sertraline (Zoloft) 50mg once daily for Depression. They are not interested in medication changes.  Previous Medication Trials: Yes Pt has taken escitalopram (Lexapro) cant remember mg once daily for Depression.  Previous Psychiatric Outpatient Treatment:  No  Previous Psychiatric Hospitalizations:  No  Previous Suicide Attempts:  No  History of Trauma:  No  Access to a Firearm:  No    Substance Use History:  Tobacco/Nicotine:  No   Alcohol: social  Illicit Substances: No  Misuse of Prescription Medications:  No  Caffeine: Yes - 20oz of ice coffee daily    Mental Status Exam  General Appearance:  unremarkable, age appropriate   Speech: normal tone, normal rate, normal pitch, normal volume      Level of Cooperation: cooperative      Thought Processes: normal and logical   Mood: steady      Thought Content: normal, no suicidality, no homicidality, delusions, or paranoia    Affect: congruent and appropriate   Orientation: Oriented x3   Memory: recent >  intact   Attention Span & Concentration: intact   Fund of General Knowledge: intact and appropriate to age and level of education   Abstract Reasoning: interpretation of similarities was abstract   Judgment & Insight: fair     Language  intact       Impression:   My diagnostic impression is Anxiety disorders; generalized anxiety disorder [F41.1], as evidenced by GAD7 and intake assessment.     Provisional Diagnosis: No diagnosis found.    Treatment Goals and Plan: Initial appointment focused on gathering history, identifying treatment goals and developing a treatment plan.      Anxiety: acquiring relapse prevention skills, reducing negative automatic thoughts, reducing physical symptoms of anxiety, and reducing time spent worrying (<30 minutes/day)    Future treatment will utilize CBT, Mindfulness Techniques, Motivational Interviewing, and Relaxation Techniques .      RETURN TO CLINIC: 2 weeks

## 2023-06-27 ENCOUNTER — PATIENT MESSAGE (OUTPATIENT)
Dept: BEHAVIORAL HEALTH | Facility: CLINIC | Age: 48
End: 2023-06-27
Payer: COMMERCIAL

## 2023-06-27 ENCOUNTER — OFFICE VISIT (OUTPATIENT)
Dept: BEHAVIORAL HEALTH | Facility: CLINIC | Age: 48
End: 2023-06-27
Payer: COMMERCIAL

## 2023-06-27 DIAGNOSIS — F41.1 GAD (GENERALIZED ANXIETY DISORDER): Primary | ICD-10-CM

## 2023-06-27 DIAGNOSIS — F51.02 ADJUSTMENT INSOMNIA: ICD-10-CM

## 2023-06-27 PROCEDURE — 90834 PR PSYCHOTHERAPY W/PATIENT, 45 MIN: ICD-10-PCS | Mod: S$GLB,,, | Performed by: SOCIAL WORKER

## 2023-06-27 PROCEDURE — 90834 PSYTX W PT 45 MINUTES: CPT | Mod: S$GLB,,, | Performed by: SOCIAL WORKER

## 2023-06-27 PROCEDURE — 3044F HG A1C LEVEL LT 7.0%: CPT | Mod: CPTII,S$GLB,, | Performed by: SOCIAL WORKER

## 2023-06-27 PROCEDURE — 3044F PR MOST RECENT HEMOGLOBIN A1C LEVEL <7.0%: ICD-10-PCS | Mod: CPTII,S$GLB,, | Performed by: SOCIAL WORKER

## 2023-06-27 NOTE — PROGRESS NOTES
Individual Psychotherapy (LCSW/PhD)  Lu Lopez,  6/27/2023    REFERRAL SOURCE:  Shawnee Cisneros MD  TYPE OF VISIT:  In person  LENGTH OF SESSION: 45  Site:  [facility]    Therapeutic Intervention: Met with patient for individual psychotherapy.    Chief complaint/reason for encounter: anxiety and grief     Interval history and content of current session: PT stated receiving education about the stages of grief was very helpful and she passed the info on to her daughter as well. PT stated she does not feel like her Zoloft is working as well as it did before and would like to reach out to her PCP to see about increasing the dose. PT stated her mood goes up and down throughout the day and she is experiencing some guilt with setting boundaries. SW encouraged PT to continue practicing setting boundaries and reminded her that boundaries equal self care. SW also encouraged PT to give herself jordan as it relates to her grieving process. PT agreed and will report progress/outcome at the next visit.    Treatment plan:  Target symptoms: lack of focus, anxiety , adjustment, grief  Why chosen therapy is appropriate versus another modality: relevant to diagnosis  Outcome monitoring methods: self-report  Therapeutic intervention type: insight oriented psychotherapy, behavior modifying psychotherapy, supportive psychotherapy    Risk parameters:  Patient reports no suicidal ideation  Patient reports no homicidal ideation  Patient reports no self-injurious behavior  Patient reports no violent behavior    Verbal deficits: None    Patient's response to intervention:  The patient's response to intervention is accepting.    Progress toward goals and other mental status changes:  The patient's progress toward goals is fair .  Results of the PHQ8 6/26/2023 6/12/2023 5/9/2023   1. Little interest or pleasure in doing things Not at all Not at all Not at all   2. Feeling down, depressed, or hopeless Several days  Several days Several days   3. Trouble falling or staying asleep, or sleeping too much Several days Several days Several days   4. Feeling tired or having little energy Several days Several days Several days   5. poor appetite or overeating Not at all Several days Not at all   6. Feeling bad about yourself - or that you are a failure or have let yourself or your family down Not at all Not at all Not at all   7. Trouble concentrating on things, such as reading the newspaper or watching television Not at all Not at all Not at all   8. Moving or speaking so slowly that other people could have noticed? Or the opposite - being so fidgety or restless that you have been moving around a lot more than usual Not at all Not at all Not at all   Total Score  3 4 3      GAD7 6/26/2023 6/12/2023 5/11/2023   1. Feeling nervous, anxious, or on edge? 1 1 1   2. Not being able to stop or control worrying? 0 1 1   3. Worrying too much about different things? 1 1 1   4. Trouble relaxing? 0 1 1   5. Being so restless that it is hard to sit still? 0 1 1   6. Becoming easily annoyed or irritable? 1 0 1   7. Feeling afraid as if something awful might happen? 0 0 0   PUMA-7 Score 3 5 6        Diagnosis:     ICD-10-CM ICD-9-CM   1. PUMA (generalized anxiety disorder)  F41.1 300.02   2. Adjustment insomnia  F51.02 307.41         Plan: Pt plans to continue individual psychotherapy    Return to clinic: 2 weeks    Length of Service (minutes): 45

## 2023-07-11 ENCOUNTER — OFFICE VISIT (OUTPATIENT)
Dept: BEHAVIORAL HEALTH | Facility: CLINIC | Age: 48
End: 2023-07-11
Payer: COMMERCIAL

## 2023-07-11 ENCOUNTER — PATIENT MESSAGE (OUTPATIENT)
Dept: BEHAVIORAL HEALTH | Facility: CLINIC | Age: 48
End: 2023-07-11
Payer: COMMERCIAL

## 2023-07-11 DIAGNOSIS — F51.02 ADJUSTMENT INSOMNIA: ICD-10-CM

## 2023-07-11 DIAGNOSIS — F41.1 GAD (GENERALIZED ANXIETY DISORDER): Primary | ICD-10-CM

## 2023-07-11 PROCEDURE — 90834 PR PSYCHOTHERAPY W/PATIENT, 45 MIN: ICD-10-PCS | Mod: S$GLB,,, | Performed by: SOCIAL WORKER

## 2023-07-11 PROCEDURE — 3044F HG A1C LEVEL LT 7.0%: CPT | Mod: CPTII,S$GLB,, | Performed by: SOCIAL WORKER

## 2023-07-11 PROCEDURE — 90834 PSYTX W PT 45 MINUTES: CPT | Mod: S$GLB,,, | Performed by: SOCIAL WORKER

## 2023-07-11 PROCEDURE — 3044F PR MOST RECENT HEMOGLOBIN A1C LEVEL <7.0%: ICD-10-PCS | Mod: CPTII,S$GLB,, | Performed by: SOCIAL WORKER

## 2023-07-19 NOTE — PROGRESS NOTES
Individual Psychotherapy (LCSW/PhD)  Lu Lopez,  7/11/2023    REFERRAL SOURCE:  Shawnee Cisneros MD  TYPE OF VISIT:  In person  LENGTH OF SESSION: 45  Site:  [facility]    Therapeutic Intervention: Met with patient for individual psychotherapy.    Chief complaint/reason for encounter: anxiety and grief     Interval history and content of current session: PT stated receiving education about the stages of grief was very helpful and she passed the info on to her daughter as well. PT stated she does not feel like her Zoloft is working as well as it did before and would like to reach out to her PCP to see about increasing the dose. PT stated her mood goes up and down throughout the day and she is experiencing some guilt with setting boundaries. SW encouraged PT to continue practicing setting boundaries and reminded her that boundaries equal self care. SW also encouraged PT to give herself jordan as it relates to her grieving process. PT agreed and will report progress/outcome at the next visit.    Treatment plan:  Target symptoms: lack of focus, anxiety , adjustment, grief  Why chosen therapy is appropriate versus another modality: relevant to diagnosis  Outcome monitoring methods: self-report  Therapeutic intervention type: insight oriented psychotherapy, behavior modifying psychotherapy, supportive psychotherapy    Risk parameters:  Patient reports no suicidal ideation  Patient reports no homicidal ideation  Patient reports no self-injurious behavior  Patient reports no violent behavior    Verbal deficits: None    Patient's response to intervention:  The patient's response to intervention is accepting.    Progress toward goals and other mental status changes:  The patient's progress toward goals is fair .  Results of the PHQ8 7/11/2023 6/26/2023 6/12/2023 5/9/2023   1. Little interest or pleasure in doing things Not at all Not at all Not at all Not at all   2. Feeling down, depressed, or  hopeless Not at all Several days Several days Several days   3. Trouble falling or staying asleep, or sleeping too much Several days Several days Several days Several days   4. Feeling tired or having little energy Several days Several days Several days Several days   5. poor appetite or overeating Not at all Not at all Several days Not at all   6. Feeling bad about yourself - or that you are a failure or have let yourself or your family down Not at all Not at all Not at all Not at all   7. Trouble concentrating on things, such as reading the newspaper or watching television Not at all Not at all Not at all Not at all   8. Moving or speaking so slowly that other people could have noticed? Or the opposite - being so fidgety or restless that you have been moving around a lot more than usual Not at all Not at all Not at all Not at all   Total Score  2 3 4 3      GAD7 7/11/2023 6/26/2023 6/12/2023   1. Feeling nervous, anxious, or on edge? 0 1 1   2. Not being able to stop or control worrying? 0 0 1   3. Worrying too much about different things? 0 1 1   4. Trouble relaxing? 0 0 1   5. Being so restless that it is hard to sit still? 0 0 1   6. Becoming easily annoyed or irritable? 0 1 0   7. Feeling afraid as if something awful might happen? 0 0 0   PUMA-7 Score 0 3 5        Diagnosis:   No diagnosis found.        Plan: Pt plans to continue individual psychotherapy    Return to clinic: 2 weeks    Length of Service (minutes): 45

## 2023-08-01 ENCOUNTER — OFFICE VISIT (OUTPATIENT)
Dept: BEHAVIORAL HEALTH | Facility: CLINIC | Age: 48
End: 2023-08-01
Payer: COMMERCIAL

## 2023-08-01 DIAGNOSIS — F51.02 ADJUSTMENT INSOMNIA: ICD-10-CM

## 2023-08-01 DIAGNOSIS — F41.1 GAD (GENERALIZED ANXIETY DISORDER): Primary | ICD-10-CM

## 2023-08-01 PROCEDURE — 3044F PR MOST RECENT HEMOGLOBIN A1C LEVEL <7.0%: ICD-10-PCS | Mod: CPTII,S$GLB,, | Performed by: SOCIAL WORKER

## 2023-08-01 PROCEDURE — 3044F HG A1C LEVEL LT 7.0%: CPT | Mod: CPTII,S$GLB,, | Performed by: SOCIAL WORKER

## 2023-08-01 PROCEDURE — 90834 PR PSYCHOTHERAPY W/PATIENT, 45 MIN: ICD-10-PCS | Mod: S$GLB,,, | Performed by: SOCIAL WORKER

## 2023-08-01 PROCEDURE — 90834 PSYTX W PT 45 MINUTES: CPT | Mod: S$GLB,,, | Performed by: SOCIAL WORKER

## 2023-08-01 NOTE — PROGRESS NOTES
Individual Psychotherapy (LCSW/PhD)  Lu Lopez,  8/1/2023    REFERRAL SOURCE:  Shawnee Cisneros MD  TYPE OF VISIT:  In person  LENGTH OF SESSION: 45  Site:  [facility]    Therapeutic Intervention: Met with patient for individual psychotherapy.    Chief complaint/reason for encounter: anxiety and grief     Interval history and content of current session: PT stated receiving education about the stages of grief was very helpful and she passed the info on to her daughter as well. PT stated her medication is working well after increasing the dose.  PT stated her mood goes up and down throughout the day and she is no longer experiencing guilt when setting/implementing boundaries. SW encouraged PT to continue practicing setting boundaries and reminded her that boundaries equal self care. SW also encouraged PT to give herself jordan as it relates to her grieving process. PT agreed and will report progress/outcome at the next visit. PT may be discharging next visit.    Treatment plan:  Target symptoms: lack of focus, anxiety , adjustment, grief  Why chosen therapy is appropriate versus another modality: relevant to diagnosis  Outcome monitoring methods: self-report  Therapeutic intervention type: insight oriented psychotherapy, behavior modifying psychotherapy, supportive psychotherapy    Risk parameters:  Patient reports no suicidal ideation  Patient reports no homicidal ideation  Patient reports no self-injurious behavior  Patient reports no violent behavior    Verbal deficits: None    Patient's response to intervention:  The patient's response to intervention is accepting.    Progress toward goals and other mental status changes:  The patient's progress toward goals is fair .  Results of the PHQ8 7/31/2023 7/11/2023 6/26/2023 6/12/2023 5/9/2023   1. Little interest or pleasure in doing things Not at all Not at all Not at all Not at all Not at all   2. Feeling down, depressed, or hopeless Not at  all Not at all Several days Several days Several days   3. Trouble falling or staying asleep, or sleeping too much Several days Several days Several days Several days Several days   4. Feeling tired or having little energy Several days Several days Several days Several days Several days   5. poor appetite or overeating Not at all Not at all Not at all Several days Not at all   6. Feeling bad about yourself - or that you are a failure or have let yourself or your family down Not at all Not at all Not at all Not at all Not at all   7. Trouble concentrating on things, such as reading the newspaper or watching television Not at all Not at all Not at all Not at all Not at all   8. Moving or speaking so slowly that other people could have noticed? Or the opposite - being so fidgety or restless that you have been moving around a lot more than usual Not at all Not at all Not at all Not at all Not at all   Total Score  2 2 3 4 3      GAD7 7/31/2023 7/11/2023 6/26/2023   1. Feeling nervous, anxious, or on edge? 0 0 1   2. Not being able to stop or control worrying? 0 0 0   3. Worrying too much about different things? 0 0 1   4. Trouble relaxing? 0 0 0   5. Being so restless that it is hard to sit still? 0 0 0   6. Becoming easily annoyed or irritable? 0 0 1   7. Feeling afraid as if something awful might happen? 0 0 0   PUMA-7 Score 0 0 3        Diagnosis:     ICD-10-CM ICD-9-CM   1. PUMA (generalized anxiety disorder)  F41.1 300.02   2. Adjustment insomnia  F51.02 307.41           Plan: Pt plans to continue individual psychotherapy    Return to clinic: 2 weeks    Length of Service (minutes): 45

## 2023-08-15 ENCOUNTER — OFFICE VISIT (OUTPATIENT)
Dept: BEHAVIORAL HEALTH | Facility: CLINIC | Age: 48
End: 2023-08-15
Payer: COMMERCIAL

## 2023-08-15 DIAGNOSIS — F41.1 GAD (GENERALIZED ANXIETY DISORDER): Primary | ICD-10-CM

## 2023-08-15 DIAGNOSIS — F51.02 ADJUSTMENT INSOMNIA: ICD-10-CM

## 2023-08-15 PROCEDURE — 3044F HG A1C LEVEL LT 7.0%: CPT | Mod: CPTII,S$GLB,, | Performed by: SOCIAL WORKER

## 2023-08-15 PROCEDURE — 3044F PR MOST RECENT HEMOGLOBIN A1C LEVEL <7.0%: ICD-10-PCS | Mod: CPTII,S$GLB,, | Performed by: SOCIAL WORKER

## 2023-08-15 PROCEDURE — 90834 PR PSYCHOTHERAPY W/PATIENT, 45 MIN: ICD-10-PCS | Mod: S$GLB,,, | Performed by: SOCIAL WORKER

## 2023-08-15 PROCEDURE — 90834 PSYTX W PT 45 MINUTES: CPT | Mod: S$GLB,,, | Performed by: SOCIAL WORKER

## 2023-08-16 NOTE — PROGRESS NOTES
Individual Psychotherapy (LCSW/PhD)  Lu Lopez,  8/15/2023    REFERRAL SOURCE:  Shawnee Cisneros MD  TYPE OF VISIT:  In person  LENGTH OF SESSION: 45  Site:  [facility]    Therapeutic Intervention: Met with patient for individual psychotherapy.    Chief complaint/reason for encounter: anxiety and grief     Interval history and content of current session: PT stated receiving education about the stages of grief was very helpful and she passed the info on to her daughter as well. PT stated her medication is working well after increasing the dose.  PT stated her mood goes up and down throughout the day and she is no longer experiencing guilt when setting/implementing boundaries. SW encouraged PT to continue practicing setting boundaries and reminded her that boundaries equal self care. SW also encouraged PT to give herself jordan as it relates to her grieving process. PT agreed and will report progress/outcome at the next visit. PT may be discharging next visit.    Treatment plan:  Target symptoms: lack of focus, anxiety , adjustment, grief  Why chosen therapy is appropriate versus another modality: relevant to diagnosis  Outcome monitoring methods: self-report  Therapeutic intervention type: insight oriented psychotherapy, behavior modifying psychotherapy, supportive psychotherapy    Risk parameters:  Patient reports no suicidal ideation  Patient reports no homicidal ideation  Patient reports no self-injurious behavior  Patient reports no violent behavior    Verbal deficits: None    Patient's response to intervention:  The patient's response to intervention is accepting.    Progress toward goals and other mental status changes:  The patient's progress toward goals is fair .  Results of the PHQ8 8/15/2023 7/31/2023 7/11/2023 6/26/2023 6/12/2023 5/9/2023   1. Little interest or pleasure in doing things Not at all Not at all Not at all Not at all Not at all Not at all   2. Feeling down,  depressed, or hopeless Not at all Not at all Not at all Several days Several days Several days   3. Trouble falling or staying asleep, or sleeping too much Several days Several days Several days Several days Several days Several days   4. Feeling tired or having little energy Several days Several days Several days Several days Several days Several days   5. poor appetite or overeating Not at all Not at all Not at all Not at all Several days Not at all   6. Feeling bad about yourself - or that you are a failure or have let yourself or your family down Not at all Not at all Not at all Not at all Not at all Not at all   7. Trouble concentrating on things, such as reading the newspaper or watching television Not at all Not at all Not at all Not at all Not at all Not at all   8. Moving or speaking so slowly that other people could have noticed? Or the opposite - being so fidgety or restless that you have been moving around a lot more than usual Not at all Not at all Not at all Not at all Not at all Not at all   Total Score  2 2 2 3 4 3      GAD7 8/15/2023 7/31/2023 7/11/2023   1. Feeling nervous, anxious, or on edge? 1 0 0   2. Not being able to stop or control worrying? 0 0 0   3. Worrying too much about different things? 0 0 0   4. Trouble relaxing? 0 0 0   5. Being so restless that it is hard to sit still? 0 0 0   6. Becoming easily annoyed or irritable? 1 0 0   7. Feeling afraid as if something awful might happen? 0 0 0   PUMA-7 Score 2 0 0        Diagnosis:   No diagnosis found.          Plan: Pt plans to continue individual psychotherapy    Return to clinic: 2 weeks    Length of Service (minutes): 45

## 2023-08-29 ENCOUNTER — PATIENT MESSAGE (OUTPATIENT)
Dept: INTERNAL MEDICINE | Facility: CLINIC | Age: 48
End: 2023-08-29
Payer: COMMERCIAL

## 2023-08-29 DIAGNOSIS — F41.1 GAD (GENERALIZED ANXIETY DISORDER): ICD-10-CM

## 2023-08-29 RX ORDER — SERTRALINE HYDROCHLORIDE 100 MG/1
100 TABLET, FILM COATED ORAL DAILY
Qty: 90 TABLET | Refills: 0 | Status: CANCELLED | OUTPATIENT
Start: 2023-08-29

## 2023-08-29 NOTE — TELEPHONE ENCOUNTER
Please offer patient an appointment with me if Dr. Cisneros not available. Ok to do VV if patient prefers. Thanks CAROL

## 2023-08-29 NOTE — TELEPHONE ENCOUNTER
No care due was identified.  [facility] Embedded Care Due Messages. Reference number: [id number].   8/29/2023 10:33:33 AM CDT

## 2023-09-06 ENCOUNTER — OFFICE VISIT (OUTPATIENT)
Dept: INTERNAL MEDICINE | Facility: CLINIC | Age: 48
End: 2023-09-06
Payer: COMMERCIAL

## 2023-09-06 VITALS — SYSTOLIC BLOOD PRESSURE: 137 MMHG | DIASTOLIC BLOOD PRESSURE: 82 MMHG | HEART RATE: 64 BPM

## 2023-09-06 DIAGNOSIS — F41.1 GAD (GENERALIZED ANXIETY DISORDER): ICD-10-CM

## 2023-09-06 DIAGNOSIS — F43.23 ADJUSTMENT DISORDER WITH MIXED ANXIETY AND DEPRESSED MOOD: Primary | ICD-10-CM

## 2023-09-06 PROCEDURE — 99214 PR OFFICE/OUTPT VISIT, EST, LEVL IV, 30-39 MIN: ICD-10-PCS | Mod: 95,,, | Performed by: PHYSICIAN ASSISTANT

## 2023-09-06 PROCEDURE — 99214 OFFICE O/P EST MOD 30 MIN: CPT | Mod: 95,,, | Performed by: PHYSICIAN ASSISTANT

## 2023-09-06 PROCEDURE — 3079F PR MOST RECENT DIASTOLIC BLOOD PRESSURE 80-89 MM HG: ICD-10-PCS | Mod: CPTII,95,, | Performed by: PHYSICIAN ASSISTANT

## 2023-09-06 PROCEDURE — 3044F HG A1C LEVEL LT 7.0%: CPT | Mod: CPTII,95,, | Performed by: PHYSICIAN ASSISTANT

## 2023-09-06 PROCEDURE — 3079F DIAST BP 80-89 MM HG: CPT | Mod: CPTII,95,, | Performed by: PHYSICIAN ASSISTANT

## 2023-09-06 PROCEDURE — 3044F PR MOST RECENT HEMOGLOBIN A1C LEVEL <7.0%: ICD-10-PCS | Mod: CPTII,95,, | Performed by: PHYSICIAN ASSISTANT

## 2023-09-06 PROCEDURE — 3075F PR MOST RECENT SYSTOLIC BLOOD PRESS GE 130-139MM HG: ICD-10-PCS | Mod: CPTII,95,, | Performed by: PHYSICIAN ASSISTANT

## 2023-09-06 PROCEDURE — 3075F SYST BP GE 130 - 139MM HG: CPT | Mod: CPTII,95,, | Performed by: PHYSICIAN ASSISTANT

## 2023-09-06 PROCEDURE — 1159F PR MEDICATION LIST DOCUMENTED IN MEDICAL RECORD: ICD-10-PCS | Mod: CPTII,95,, | Performed by: PHYSICIAN ASSISTANT

## 2023-09-06 PROCEDURE — 1159F MED LIST DOCD IN RCRD: CPT | Mod: CPTII,95,, | Performed by: PHYSICIAN ASSISTANT

## 2023-09-06 RX ORDER — SERTRALINE HYDROCHLORIDE 50 MG/1
100 TABLET, FILM COATED ORAL DAILY
Qty: 90 TABLET | Refills: 3 | Status: SHIPPED | OUTPATIENT
Start: 2023-09-06 | End: 2023-09-08

## 2023-09-06 NOTE — PROGRESS NOTES
"INTERNAL MEDICINE PROGRESS NOTE    CHIEF COMPLAINT     Chief Complaint   Patient presents with    Depression       HPI     Lu Lopez is a 48 y.o. female who presents for a follow-up visit today.    PCP is Shawnee Cisneros MD, patient is new to me.     Patient presents with complaints of increased depression symptoms. She reports that she has been managed for the past few months with zoloft 50 mg daily. This has worked well until her mother passed away unexpectedly a few weeks ago. Patient reports that she is feeling hopeless and is having a hard time "pulling myself out of this funk". She is seeing CARI Daniels with our [facility] program and reports that this is helping but is inquiring today if increasing dosage of Zoloft might help.   She denies Si or HI  She denies A/V Hallucinations.   She is at home during this VV  Face time with the patient is 10 mins.     Past Medical History:  Past Medical History:   Diagnosis Date    Endometriosis        Home Medications:  Prior to Admission medications    Medication Sig Start Date End Date Taking? Authorizing Provider   ergocalciferol, vitamin D2, (VITAMIN D ORAL) Take by mouth.   Yes Provider, Historical   hydroCHLOROthiazide (HYDRODIURIL) 12.5 MG Tab Take 1 tablet (12.5 mg total) by mouth once daily. 2/20/23  Yes Shawnee Cisneros MD   norethindrone (MICRONOR) 0.35 mg tablet  1/4/20  Yes Provider, Historical   norethindrone (MICRONOR) 0.35 mg tablet Take 1 tablet by mouth once daily. 12/13/21  Yes Provider, Historical   norethindrone (MICRONOR) 0.35 mg tablet Take 1 tablet by mouth once daily. 11/28/22  Yes Provider, Historical   omeprazole (PRILOSEC) 40 MG capsule Take 1 capsule (40 mg total) by mouth once daily. 10/20/22 10/20/23 Yes Balaji Brown MD   ondansetron (ZOFRAN-ODT) 4 MG TbDL Take 2 tablets (8 mg total) by mouth every 12 (twelve) hours as needed (nausea). 5/18/23  Yes Shawnee Cisneros MD   sertraline (ZOLOFT) 50 MG tablet Take 1 tablet (50 " mg total) by mouth once daily. Take 25mg by mouth nightly for 2 weeks and then increase to 50mg 5/18/23  Yes Shawnee Cisneros MD   sumatriptan (IMITREX) 100 MG tablet Do not exceed max 200mg/24h 4/21/23  Yes Shawnee Cisneros MD   traZODone (DESYREL) 100 MG tablet Take 1 tablet (100 mg total) by mouth nightly as needed for Insomnia. 5/18/23  Yes Shawnee Cisneros MD       Review of Systems:  Review of Systems   Constitutional:  Negative for activity change and unexpected weight change.   HENT:  Negative for hearing loss, rhinorrhea and trouble swallowing.    Eyes:  Negative for discharge and visual disturbance.   Respiratory:  Negative for chest tightness and wheezing.    Cardiovascular:  Negative for chest pain and palpitations.   Gastrointestinal:  Negative for blood in stool, constipation, diarrhea and vomiting.   Endocrine: Negative for polydipsia and polyuria.   Genitourinary:  Negative for difficulty urinating, dysuria, hematuria and menstrual problem.   Musculoskeletal:  Negative for arthralgias, joint swelling and neck pain.   Neurological:  Negative for weakness and headaches.   Psychiatric/Behavioral:  Positive for dysphoric mood. Negative for confusion.        Health Maintainence:   Immunizations:  Health Maintenance         Date Due Completion Date    Cervical Cancer Screening 11/06/2018 11/6/2013    COVID-19 Vaccine (4 - Pfizer series) 02/27/2022 1/2/2022    Influenza Vaccine (1) 09/01/2023 1/2/2022    Mammogram 08/31/2024 8/31/2023    Override on 12/15/2020: Done    Override on 6/16/2020: Done (at touro and negative)    Override on 3/21/2018: Done    Override on 1/11/2017: Done    Lipid Panel 04/21/2028 4/21/2023    TETANUS VACCINE 01/02/2029 1/2/2019    Colorectal Cancer Screening 03/25/2032 3/25/2022             PHYSICAL EXAM     /82 (BP Location: Left arm, Patient Position: Sitting)   Pulse 64     Physical Exam  Constitutional:       Comments: Healthy appearing female in NAD or  apparent pain. She makes good eye contact, speaks in clear full sentences and ambulates with ease.     Tearful at times during the interivew         LABS     Lab Results   Component Value Date    HGBA1C 5.1 04/21/2023     CMP  Sodium   Date Value Ref Range Status   02/02/2023 139 136 - 145 mmol/L Final     Potassium   Date Value Ref Range Status   02/02/2023 4.5 3.5 - 5.1 mmol/L Final     Chloride   Date Value Ref Range Status   02/02/2023 104 95 - 110 mmol/L Final     CO2   Date Value Ref Range Status   02/02/2023 27 23 - 29 mmol/L Final     Glucose   Date Value Ref Range Status   02/02/2023 107 70 - 110 mg/dL Final     BUN   Date Value Ref Range Status   02/02/2023 14 6 - 20 mg/dL Final     Creatinine   Date Value Ref Range Status   02/02/2023 0.8 0.5 - 1.4 mg/dL Final     Calcium   Date Value Ref Range Status   02/02/2023 10.1 8.7 - 10.5 mg/dL Final     Total Protein   Date Value Ref Range Status   02/02/2023 8.2 6.0 - 8.4 g/dL Final     Albumin   Date Value Ref Range Status   02/02/2023 4.7 3.5 - 5.2 g/dL Final     Total Bilirubin   Date Value Ref Range Status   02/02/2023 0.4 0.1 - 1.0 mg/dL Final     Comment:     For infants and newborns, interpretation of results should be based  on gestational age, weight and in agreement with clinical  observations.    Premature Infant recommended reference ranges:  Up to 24 hours.............<8.0 mg/dL  Up to 48 hours............<12.0 mg/dL  3-5 days..................<15.0 mg/dL  6-29 days.................<15.0 mg/dL       Alkaline Phosphatase   Date Value Ref Range Status   02/02/2023 45 (L) 55 - 135 U/L Final     AST   Date Value Ref Range Status   02/02/2023 17 10 - 40 U/L Final     ALT   Date Value Ref Range Status   02/02/2023 15 10 - 44 U/L Final     Anion Gap   Date Value Ref Range Status   02/02/2023 8 8 - 16 mmol/L Final     eGFR if    Date Value Ref Range Status   02/16/2022 >60 >60 mL/min/1.73 m^2 Final     eGFR if non    Date  Value Ref Range Status   02/16/2022 >60 >60 mL/min/1.73 m^2 Final     Comment:     Calculation used to obtain the estimated glomerular filtration  rate (eGFR) is the CKD-EPI equation.        Lab Results   Component Value Date    WBC 6.91 02/02/2023    HGB 12.7 02/02/2023    HCT 39.9 02/02/2023    MCV 86 02/02/2023     02/02/2023     Lab Results   Component Value Date    CHOL 185 04/21/2023    CHOL 159 02/16/2022    CHOL 143 03/01/2021     Lab Results   Component Value Date    HDL 70 04/21/2023    HDL 57 02/16/2022    HDL 51 03/01/2021     Lab Results   Component Value Date    LDLCALC 105.8 04/21/2023    LDLCALC 87.4 02/16/2022    LDLCALC 78.2 03/01/2021     Lab Results   Component Value Date    TRIG 46 04/21/2023    TRIG 73 02/16/2022    TRIG 69 03/01/2021     Lab Results   Component Value Date    CHOLHDL 37.8 04/21/2023    CHOLHDL 35.8 02/16/2022    CHOLHDL 35.7 03/01/2021     Lab Results   Component Value Date    TSH 1.024 04/21/2023       ASSESSMENT/PLAN     Lu Lopez is a 48 y.o. female     Lu was seen today for depression. RTC in 2-4 weeks for symptom re-check. No grave disability appreciated on this VV.     Diagnoses and all orders for this visit:    Adjustment disorder with mixed anxiety and depressed mood    PUMA (generalized anxiety disorder)  -     sertraline (ZOLOFT) 50 MG tablet; Take 2 tablets (100 mg total) by mouth once daily. Take 25mg by mouth nightly for 2 weeks and then increase to 50mg        Deyanira Prater PA-C   Department of Internal Medicine - [facility]   10:21 AM

## 2023-09-08 ENCOUNTER — PATIENT MESSAGE (OUTPATIENT)
Dept: INTERNAL MEDICINE | Facility: CLINIC | Age: 48
End: 2023-09-08
Payer: COMMERCIAL

## 2023-09-08 DIAGNOSIS — F41.9 ANXIETY: Primary | ICD-10-CM

## 2023-09-08 RX ORDER — SERTRALINE HYDROCHLORIDE 100 MG/1
100 TABLET, FILM COATED ORAL DAILY
Qty: 90 TABLET | Refills: 3 | Status: SHIPPED | OUTPATIENT
Start: 2023-09-08 | End: 2024-03-07 | Stop reason: SDUPTHER

## 2023-09-26 ENCOUNTER — PATIENT MESSAGE (OUTPATIENT)
Dept: INTERNAL MEDICINE | Facility: CLINIC | Age: 48
End: 2023-09-26

## 2023-09-26 ENCOUNTER — TELEPHONE (OUTPATIENT)
Dept: INTERNAL MEDICINE | Facility: CLINIC | Age: 48
End: 2023-09-26

## 2023-09-26 ENCOUNTER — OFFICE VISIT (OUTPATIENT)
Dept: INTERNAL MEDICINE | Facility: CLINIC | Age: 48
End: 2023-09-26
Payer: COMMERCIAL

## 2023-09-26 DIAGNOSIS — R55 VASOVAGAL NEAR SYNCOPE: Primary | ICD-10-CM

## 2023-09-26 DIAGNOSIS — F33.1 MODERATE EPISODE OF RECURRENT MAJOR DEPRESSIVE DISORDER: ICD-10-CM

## 2023-09-26 DIAGNOSIS — I10 HTN (HYPERTENSION), BENIGN: ICD-10-CM

## 2023-09-26 DIAGNOSIS — F41.1 GAD (GENERALIZED ANXIETY DISORDER): ICD-10-CM

## 2023-09-26 PROCEDURE — 1160F RVW MEDS BY RX/DR IN RCRD: CPT | Mod: CPTII,95,, | Performed by: INTERNAL MEDICINE

## 2023-09-26 PROCEDURE — 3044F HG A1C LEVEL LT 7.0%: CPT | Mod: CPTII,95,, | Performed by: INTERNAL MEDICINE

## 2023-09-26 PROCEDURE — 99214 OFFICE O/P EST MOD 30 MIN: CPT | Mod: 95,,, | Performed by: INTERNAL MEDICINE

## 2023-09-26 PROCEDURE — 1159F PR MEDICATION LIST DOCUMENTED IN MEDICAL RECORD: ICD-10-PCS | Mod: CPTII,95,, | Performed by: INTERNAL MEDICINE

## 2023-09-26 PROCEDURE — 99214 PR OFFICE/OUTPT VISIT, EST, LEVL IV, 30-39 MIN: ICD-10-PCS | Mod: 95,,, | Performed by: INTERNAL MEDICINE

## 2023-09-26 PROCEDURE — 1159F MED LIST DOCD IN RCRD: CPT | Mod: CPTII,95,, | Performed by: INTERNAL MEDICINE

## 2023-09-26 PROCEDURE — 3044F PR MOST RECENT HEMOGLOBIN A1C LEVEL <7.0%: ICD-10-PCS | Mod: CPTII,95,, | Performed by: INTERNAL MEDICINE

## 2023-09-26 PROCEDURE — 1160F PR REVIEW ALL MEDS BY PRESCRIBER/CLIN PHARMACIST DOCUMENTED: ICD-10-PCS | Mod: CPTII,95,, | Performed by: INTERNAL MEDICINE

## 2023-09-26 NOTE — PROGRESS NOTES
"The patient location is: [place]  The chief complaint leading to consultation is: Depression, Anxiety, and Dizziness    Visit type: Virtual visit with synchronous audio and video  Contact time spent with patient: 17 minutes  Each patient to whom he or she provides medical services by telemedicine is:  (1) informed of the relationship between the physician and patient and the respective role of any other health care provider with respect to management of the patient; and (2) notified that he or she may decline to receive medical services by telemedicine and may withdraw from such care at any time.    Subjective:       Patient ID: Lu Lopez is a 48 y.o. female who  has a past medical history of Endometriosis.    Chief Complaint: Depression, Anxiety, and Dizziness     History was obtained from the patient and supplemented through chart review.  She is a patient of Shawnee Cisneros MD.  Was last seen 3 weeks ago for anxiety, depression.    HPI    Depression:  Has been on Zoloft 50 since April.  She has been experiencing grief since her mother then passed away in May unexpectedly.  She follows with [facility] program.     Zoloft was increased to 100 mg x 3 weeks.  Initially had nausea during the dose adjustment that resolved.  She still struggles with mental health, but was physically feeling fine.    She was in her usual state of health until yesterday morning.  She took HCTZ 12.5 as usual yesterday morning.  She takes this consistently.  No extra doses by accident.  She went to the bathroom that morning and felt lightheadedness, global weakness, dizziness, a feeling of warmth, palpitations.  She felt like she was going to pass out.  Her  had to help her out of the tub into the bed to lay down. She checked her BP which was 105/65, HR 92.   Later, her repeat BP was 126/76, HR 66.  Her usual BP is 114//82.  HR 60-70    Today, she still feels "off", fatigued, sluggish.  Symptoms improved, but does not feel " 100% better yet.  She did not exercise at the gym yesterday.  Her BP was 129/81, HR 58 today.  Denies CP, SOB, CRAWFORD, lightheadedness, dizziness. No leg edema, pain.  Has a dull headache.     No missed meals recently.   No dehydration, nausea, vomiting, diarrhea.    Went to [employer] 2 days ago. Had 2 drinks. ETOH 1-2/week.      Denies infx sx, such as fever, cough, abd pain, dysuria, diarrhea.  No sick contacts.  No respiratory sx.    No changes to her medications recently other than the increase of Zoloft.  On trazodone 100 q.h.s. nightly. Has been on this for a while.     Review of Systems   Constitutional:  Positive for fatigue. Negative for activity change, fever and unexpected weight change.   HENT:  Negative for hearing loss, rhinorrhea and trouble swallowing.    Eyes:  Negative for discharge and visual disturbance.   Respiratory:  Negative for cough, chest tightness, shortness of breath and wheezing.    Cardiovascular:  Positive for palpitations. Negative for chest pain and leg swelling.   Gastrointestinal:  Negative for blood in stool, constipation, diarrhea, nausea and vomiting.   Endocrine: Negative for polydipsia and polyuria.   Genitourinary:  Negative for difficulty urinating, dysuria and hematuria.   Musculoskeletal:  Negative for arthralgias, joint swelling and neck pain.   Neurological:  Positive for headaches. Negative for weakness.   Psychiatric/Behavioral:  Negative for confusion and dysphoric mood.          Past Medical History:   Diagnosis Date    Endometriosis      Past Surgical History:   Procedure Laterality Date    DILATION AND CURETTAGE OF UTERUS      PELVIC LAPAROSCOPY       Family History   Problem Relation Age of Onset    Fibroids Mother     Hypertension Mother     Depression Mother     Hypertension Father     Hyperlipidemia Father     Heart disease Father         CAD s/p stents    No Known Problems Sister     No Known Problems Brother     No Known Problems Daughter     No Known Problems  Sister     Diabetes Daughter         Type 1 diabetes    Depression Daughter     No Known Problems Daughter      Social History     Socioeconomic History    Marital status:    Occupational History    Occupation: fundraising for diabetes    Tobacco Use    Smoking status: Former     Current packs/day: 0.00     Types: Cigarettes     Quit date: 10/20/1995     Years since quittin.9     Passive exposure: Past    Smokeless tobacco: Never    Tobacco comments:     social tob in college x couple of years   Substance and Sexual Activity    Alcohol use: Yes     Alcohol/week: 3.0 - 4.0 standard drinks of alcohol     Types: 3 - 4 Drinks containing 0.5 oz of alcohol per week     Comment: socially    Drug use: No    Sexual activity: Yes     Partners: Male     Comment: Birth Control Pills     Social Determinants of Health     Financial Resource Strain: Low Risk  (2023)    Overall Financial Resource Strain (CARDIA)     Difficulty of Paying Living Expenses: Not hard at all   Food Insecurity: No Food Insecurity (2023)    Hunger Vital Sign     Worried About Running Out of Food in the Last Year: Never true     Ran Out of Food in the Last Year: Never true   Transportation Needs: No Transportation Needs (2023)    PRAPARE - Transportation     Lack of Transportation (Medical): No     Lack of Transportation (Non-Medical): No   Physical Activity: Sufficiently Active (2023)    Exercise Vital Sign     Days of Exercise per Week: 3 days     Minutes of Exercise per Session: 60 min   Stress: Stress Concern Present (2023)    [employer] - Occupational Stress Questionnaire     Feeling of Stress : To some extent   Social Connections: Unknown (2023)    Social Connection and Isolation Panel [NHANES]     Frequency of Communication with Friends and Family: More than three times a week     Frequency of Social Gatherings with Friends and Family: Twice a week     Active Member of Clubs or  Organizations: No     Attends Club or Organization Meetings: Patient refused     Marital Status:    Housing Stability: Low Risk  (9/26/2023)    Housing Stability Vital Sign     Unable to Pay for Housing in the Last Year: No     Number of Places Lived in the Last Year: 1     Unstable Housing in the Last Year: No     Objective:      There were no vitals filed for this visit.   Physical Exam  Constitutional:       General: She is not in acute distress.     Appearance: She is well-developed. She is not diaphoretic.   HENT:      Head: Normocephalic and atraumatic.   Eyes:      General:         Right eye: No discharge.         Left eye: No discharge.   Pulmonary:      Effort: Pulmonary effort is normal. No respiratory distress.   Skin:     Coloration: Skin is not pale.      Findings: No erythema.   Neurological:      Mental Status: She is alert.   Psychiatric:         Behavior: Behavior normal.         Lab Results   Component Value Date    WBC 6.91 02/02/2023    HGB 12.7 02/02/2023    HCT 39.9 02/02/2023     02/02/2023    CHOL 185 04/21/2023    TRIG 46 04/21/2023    HDL 70 04/21/2023    ALT 15 02/02/2023    AST 17 02/02/2023     02/02/2023    K 4.5 02/02/2023     02/02/2023    CREATININE 0.8 02/02/2023    BUN 14 02/02/2023    CO2 27 02/02/2023    TSH 1.024 04/21/2023    HGBA1C 5.1 04/21/2023       The 10-year ASCVD risk score (Himanshu LEVY, et al., 2019) is: 0.9%    Values used to calculate the score:      Age: 48 years      Sex: Female      Is Non- : No      Diabetic: No      Tobacco smoker: No      Systolic Blood Pressure: 137 mmHg      Is BP treated: Yes      HDL Cholesterol: 70 mg/dL      Total Cholesterol: 185 mg/dL    Assessment:       1. Vasovagal near syncope    2. HTN (hypertension), benign    3. Moderate episode of recurrent major depressive disorder    4. PUMA (generalized anxiety disorder)          Plan:       Lu was seen today for depression, anxiety and  dizziness.    Diagnoses and all orders for this visit:    Vasovagal near syncope  Comments:  Improved. BP improved. Unclear etiology, but not c/w SSRI SE. No infx sx. Hydration. Avoid strenuous activities, ETOH for now.  Monitor symptoms.    HTN (hypertension), benign  Comments:  BP was low yesterday with symptoms. Improved today. Activity as tolerated, hydration.  Continue current meds.    Moderate episode of recurrent major depressive disorder  Comments:  Not yet optimized.  Continue Zoloft 100 for now. Will discuss med options at next visit. Continue BHI.    PUMA (generalized anxiety disorder)  Comments:  As above.         Side effects of medication(s) were discussed in detail and patient voiced understanding.  Patient will call back for any issues or complications.     RTC in 1 week(s) or sooner PRN for fatigue, mental health. Virtual with me.

## 2023-09-26 NOTE — TELEPHONE ENCOUNTER
----- Message from Mili Segovia MD sent at 9/26/2023 11:23 AM CDT -----  1. Please schedule a visit in 1 week for fatigue, mental health. Virtual with me.  Thank you!

## 2023-09-26 NOTE — TELEPHONE ENCOUNTER
"Schedule pt for Oct 3, 2023 at Dr. Segovia' request:    "Please schedule a visit in 1 week for fatigue, mental health. Virtual with me."  "

## 2023-09-28 ENCOUNTER — PATIENT MESSAGE (OUTPATIENT)
Dept: INTERNAL MEDICINE | Facility: CLINIC | Age: 48
End: 2023-09-28
Payer: COMMERCIAL

## 2023-09-28 NOTE — TELEPHONE ENCOUNTER
Please ask patient to hold her HCTZ dose, hydrate well (8-10 glasses of water a day), continue to monitor her BP and ask her to follow-up with me in clinic either tomorrow or Monday. Please let me know if there are any questions or concerns.   -Deyanira Prater

## 2023-10-02 ENCOUNTER — OFFICE VISIT (OUTPATIENT)
Dept: INTERNAL MEDICINE | Facility: CLINIC | Age: 48
End: 2023-10-02
Payer: COMMERCIAL

## 2023-10-02 ENCOUNTER — LAB VISIT (OUTPATIENT)
Dept: LAB | Facility: OTHER | Age: 48
End: 2023-10-02
Payer: COMMERCIAL

## 2023-10-02 VITALS
WEIGHT: 146.38 LBS | BODY MASS INDEX: 22.98 KG/M2 | OXYGEN SATURATION: 99 % | HEART RATE: 69 BPM | DIASTOLIC BLOOD PRESSURE: 72 MMHG | HEIGHT: 67 IN | SYSTOLIC BLOOD PRESSURE: 116 MMHG

## 2023-10-02 DIAGNOSIS — F41.9 ANXIETY: ICD-10-CM

## 2023-10-02 DIAGNOSIS — R00.2 PALPITATIONS: ICD-10-CM

## 2023-10-02 DIAGNOSIS — R55 PRE-SYNCOPE: ICD-10-CM

## 2023-10-02 DIAGNOSIS — R55 PRE-SYNCOPE: Primary | ICD-10-CM

## 2023-10-02 DIAGNOSIS — I10 HTN (HYPERTENSION), BENIGN: ICD-10-CM

## 2023-10-02 LAB
BILIRUB UR QL STRIP: NEGATIVE
CLARITY UR: CLEAR
COLOR UR: COLORLESS
GLUCOSE UR QL STRIP: NEGATIVE
HGB UR QL STRIP: ABNORMAL
KETONES UR QL STRIP: NEGATIVE
LEUKOCYTE ESTERASE UR QL STRIP: NEGATIVE
MICROSCOPIC COMMENT: NORMAL
NITRITE UR QL STRIP: NEGATIVE
PH UR STRIP: 7 [PH] (ref 5–8)
PROT UR QL STRIP: NEGATIVE
RBC #/AREA URNS HPF: 3 /HPF (ref 0–4)
SP GR UR STRIP: 1.01 (ref 1–1.03)
SQUAMOUS #/AREA URNS HPF: 0 /HPF
URN SPEC COLLECT METH UR: ABNORMAL
UROBILINOGEN UR STRIP-ACNC: NEGATIVE EU/DL
WBC #/AREA URNS HPF: 0 /HPF (ref 0–5)

## 2023-10-02 PROCEDURE — 1159F MED LIST DOCD IN RCRD: CPT | Mod: CPTII,S$GLB,, | Performed by: PHYSICIAN ASSISTANT

## 2023-10-02 PROCEDURE — 99213 PR OFFICE/OUTPT VISIT, EST, LEVL III, 20-29 MIN: ICD-10-PCS | Mod: S$GLB,,, | Performed by: PHYSICIAN ASSISTANT

## 2023-10-02 PROCEDURE — 3078F DIAST BP <80 MM HG: CPT | Mod: CPTII,S$GLB,, | Performed by: PHYSICIAN ASSISTANT

## 2023-10-02 PROCEDURE — 99213 OFFICE O/P EST LOW 20 MIN: CPT | Mod: S$GLB,,, | Performed by: PHYSICIAN ASSISTANT

## 2023-10-02 PROCEDURE — 3008F PR BODY MASS INDEX (BMI) DOCUMENTED: ICD-10-PCS | Mod: CPTII,S$GLB,, | Performed by: PHYSICIAN ASSISTANT

## 2023-10-02 PROCEDURE — 3044F PR MOST RECENT HEMOGLOBIN A1C LEVEL <7.0%: ICD-10-PCS | Mod: CPTII,S$GLB,, | Performed by: PHYSICIAN ASSISTANT

## 2023-10-02 PROCEDURE — 3008F BODY MASS INDEX DOCD: CPT | Mod: CPTII,S$GLB,, | Performed by: PHYSICIAN ASSISTANT

## 2023-10-02 PROCEDURE — 3044F HG A1C LEVEL LT 7.0%: CPT | Mod: CPTII,S$GLB,, | Performed by: PHYSICIAN ASSISTANT

## 2023-10-02 PROCEDURE — 87086 URINE CULTURE/COLONY COUNT: CPT | Performed by: PHYSICIAN ASSISTANT

## 2023-10-02 PROCEDURE — 99999 PR PBB SHADOW E&M-EST. PATIENT-LVL III: ICD-10-PCS | Mod: PBBFAC,,, | Performed by: PHYSICIAN ASSISTANT

## 2023-10-02 PROCEDURE — 99999 PR PBB SHADOW E&M-EST. PATIENT-LVL III: CPT | Mod: PBBFAC,,, | Performed by: PHYSICIAN ASSISTANT

## 2023-10-02 PROCEDURE — 3074F SYST BP LT 130 MM HG: CPT | Mod: CPTII,S$GLB,, | Performed by: PHYSICIAN ASSISTANT

## 2023-10-02 PROCEDURE — 3078F PR MOST RECENT DIASTOLIC BLOOD PRESSURE < 80 MM HG: ICD-10-PCS | Mod: CPTII,S$GLB,, | Performed by: PHYSICIAN ASSISTANT

## 2023-10-02 PROCEDURE — 81000 URINALYSIS NONAUTO W/SCOPE: CPT | Performed by: PHYSICIAN ASSISTANT

## 2023-10-02 PROCEDURE — 1159F PR MEDICATION LIST DOCUMENTED IN MEDICAL RECORD: ICD-10-PCS | Mod: CPTII,S$GLB,, | Performed by: PHYSICIAN ASSISTANT

## 2023-10-02 PROCEDURE — 3074F PR MOST RECENT SYSTOLIC BLOOD PRESSURE < 130 MM HG: ICD-10-PCS | Mod: CPTII,S$GLB,, | Performed by: PHYSICIAN ASSISTANT

## 2023-10-02 NOTE — PROGRESS NOTES
INTERNAL MEDICINE PROGRESS NOTE    CHIEF COMPLAINT     Chief Complaint   Patient presents with    Hypertension       HPI     Lu Lopez is a 48 y.o. female who presents for a follow-up visit today.    PCP is Shawnee Cisneros MD, patient is known to me.     Patient prsents today for symptom re-check of depression after increase daily Zoloft dosage from 50 to 100 mg daily.     Normal neuro  Not orthostatic  Has stopped taking BP medication 3 days ago -HCTZ  12.5mg    Takes oral BC continuously, has history of endometriosis. Had vaginal spotting which was unusual but not heavy bleeding   Normal bowel movements.   No dysuria  No hematuria  No URI symptoms   NO fever  NO sick contacts,  No recent travel.         Past Medical History:  Past Medical History:   Diagnosis Date    Endometriosis        Home Medications:  Prior to Admission medications    Medication Sig Start Date End Date Taking? Authorizing Provider   ergocalciferol, vitamin D2, (VITAMIN D ORAL) Take by mouth.   Yes Provider, Historical   norethindrone (MICRONOR) 0.35 mg tablet  1/4/20  Yes Provider, Historical   norethindrone (MICRONOR) 0.35 mg tablet Take 1 tablet by mouth once daily. 12/13/21  Yes Provider, Historical   norethindrone (MICRONOR) 0.35 mg tablet Take 1 tablet by mouth once daily. 11/28/22  Yes Provider, Historical   omeprazole (PRILOSEC) 40 MG capsule Take 1 capsule (40 mg total) by mouth once daily. 10/20/22 10/20/23 Yes Balaji Brown MD   ondansetron (ZOFRAN-ODT) 4 MG TbDL Take 2 tablets (8 mg total) by mouth every 12 (twelve) hours as needed (nausea). 5/18/23  Yes Shawnee Cisneros MD   sertraline (ZOLOFT) 100 MG tablet Take 1 tablet (100 mg total) by mouth once daily. 9/8/23 9/7/24 Yes Shawnee Cisneros MD   sumatriptan (IMITREX) 100 MG tablet Do not exceed max 200mg/24h 4/21/23  Yes Shawnee Cisneros MD   traZODone (DESYREL) 100 MG tablet Take 1 tablet (100 mg total) by mouth nightly as needed for Insomnia.  "5/18/23  Yes Shawnee Cisneros MD   hydroCHLOROthiazide (HYDRODIURIL) 12.5 MG Tab Take 1 tablet (12.5 mg total) by mouth once daily.  Patient not taking: Reported on 10/2/2023 2/20/23   Shawnee Cisneros MD       Review of Systems:  Review of Systems   Constitutional:  Positive for fatigue. Negative for chills and fever.   HENT:  Negative for sore throat and trouble swallowing.    Eyes:  Negative for visual disturbance.   Respiratory:  Negative for cough and shortness of breath.    Cardiovascular:  Negative for chest pain.   Gastrointestinal:  Negative for abdominal pain, constipation, diarrhea, nausea and vomiting.   Genitourinary:  Negative for dysuria and flank pain.   Musculoskeletal:  Negative for back pain, neck pain and neck stiffness.   Skin:  Negative for rash.   Neurological:  Negative for dizziness, syncope, weakness and headaches.        Pre-syncope    Psychiatric/Behavioral:  Negative for confusion.        Health Maintainence:   Immunizations:  Health Maintenance         Date Due Completion Date    Cervical Cancer Screening 11/06/2018 11/6/2013    COVID-19 Vaccine (4 - Pfizer series) 02/27/2022 1/2/2022    Influenza Vaccine (1) 09/01/2023 1/2/2022    Mammogram 08/31/2024 8/31/2023    Override on 12/15/2020: Done    Override on 6/16/2020: Done (at touro and negative)    Override on 3/21/2018: Done    Override on 1/11/2017: Done    Lipid Panel 04/21/2028 4/21/2023    TETANUS VACCINE 01/02/2029 1/2/2019    Colorectal Cancer Screening 03/25/2032 3/25/2022             PHYSICAL EXAM     /72 (BP Location: Left arm, Patient Position: Sitting, BP Method: Large (Manual))   Pulse 69   Ht 5' 7" (1.702 m)   Wt 66.4 kg (146 lb 6.2 oz)   SpO2 99%   BMI 22.93 kg/m²     Physical Exam    LABS     Lab Results   Component Value Date    HGBA1C 5.1 04/21/2023     CMP  Sodium   Date Value Ref Range Status   02/02/2023 139 136 - 145 mmol/L Final     Potassium   Date Value Ref Range Status   02/02/2023 4.5 " 3.5 - 5.1 mmol/L Final     Chloride   Date Value Ref Range Status   02/02/2023 104 95 - 110 mmol/L Final     CO2   Date Value Ref Range Status   02/02/2023 27 23 - 29 mmol/L Final     Glucose   Date Value Ref Range Status   02/02/2023 107 70 - 110 mg/dL Final     BUN   Date Value Ref Range Status   02/02/2023 14 6 - 20 mg/dL Final     Creatinine   Date Value Ref Range Status   02/02/2023 0.8 0.5 - 1.4 mg/dL Final     Calcium   Date Value Ref Range Status   02/02/2023 10.1 8.7 - 10.5 mg/dL Final     Total Protein   Date Value Ref Range Status   02/02/2023 8.2 6.0 - 8.4 g/dL Final     Albumin   Date Value Ref Range Status   02/02/2023 4.7 3.5 - 5.2 g/dL Final     Total Bilirubin   Date Value Ref Range Status   02/02/2023 0.4 0.1 - 1.0 mg/dL Final     Comment:     For infants and newborns, interpretation of results should be based  on gestational age, weight and in agreement with clinical  observations.    Premature Infant recommended reference ranges:  Up to 24 hours.............<8.0 mg/dL  Up to 48 hours............<12.0 mg/dL  3-5 days..................<15.0 mg/dL  6-29 days.................<15.0 mg/dL       Alkaline Phosphatase   Date Value Ref Range Status   02/02/2023 45 (L) 55 - 135 U/L Final     AST   Date Value Ref Range Status   02/02/2023 17 10 - 40 U/L Final     ALT   Date Value Ref Range Status   02/02/2023 15 10 - 44 U/L Final     Anion Gap   Date Value Ref Range Status   02/02/2023 8 8 - 16 mmol/L Final     eGFR if    Date Value Ref Range Status   02/16/2022 >60 >60 mL/min/1.73 m^2 Final     eGFR if non    Date Value Ref Range Status   02/16/2022 >60 >60 mL/min/1.73 m^2 Final     Comment:     Calculation used to obtain the estimated glomerular filtration  rate (eGFR) is the CKD-EPI equation.        Lab Results   Component Value Date    WBC 6.91 02/02/2023    HGB 12.7 02/02/2023    HCT 39.9 02/02/2023    MCV 86 02/02/2023     02/02/2023     Lab Results   Component  Value Date    CHOL 185 04/21/2023    CHOL 159 02/16/2022    CHOL 143 03/01/2021     Lab Results   Component Value Date    HDL 70 04/21/2023    HDL 57 02/16/2022    HDL 51 03/01/2021     Lab Results   Component Value Date    LDLCALC 105.8 04/21/2023    LDLCALC 87.4 02/16/2022    LDLCALC 78.2 03/01/2021     Lab Results   Component Value Date    TRIG 46 04/21/2023    TRIG 73 02/16/2022    TRIG 69 03/01/2021     Lab Results   Component Value Date    CHOLHDL 37.8 04/21/2023    CHOLHDL 35.8 02/16/2022    CHOLHDL 35.7 03/01/2021     Lab Results   Component Value Date    TSH 1.024 04/21/2023       ASSESSMENT/PLAN     Lu Lopez is a 48 y.o. female     Lu was seen today for hypertension. Unclear etiology for pre-syncope symptoms. No recurrent pre-syn cope but overall feeling fatigued and weak - no deficits on clinical exam. Will get labs and urine and will get EKG and holter. She is aware of ED prompts.     Diagnoses and all orders for this visit:    Pre-syncope  -     Comprehensive Metabolic Panel; Future  -     TSH; Future  -     CBC Auto Differential; Future  -     Urine culture; Future  -     Urinalysis; Future  -     Holter monitor - 48 hour; Future  -     EKG 12-lead; Future    HTN (hypertension), benign    Palpitations    Anxiety      Deyanira Prater PA-C   Department of Internal Medicine - [facility]   12:46 PM

## 2023-10-03 ENCOUNTER — HOSPITAL ENCOUNTER (OUTPATIENT)
Dept: CARDIOLOGY | Facility: OTHER | Age: 48
Discharge: HOME | End: 2023-10-03
Attending: INTERNAL MEDICINE
Payer: COMMERCIAL

## 2023-10-03 DIAGNOSIS — R55 PRE-SYNCOPE: ICD-10-CM

## 2023-10-03 LAB — BACTERIA UR CULT: NO GROWTH

## 2023-10-03 PROCEDURE — 93010 ELECTROCARDIOGRAM REPORT: CPT | Mod: ,,, | Performed by: INTERNAL MEDICINE

## 2023-10-03 PROCEDURE — 93005 ELECTROCARDIOGRAM TRACING: CPT

## 2023-10-03 PROCEDURE — 93010 EKG 12-LEAD: ICD-10-PCS | Mod: ,,, | Performed by: INTERNAL MEDICINE

## 2023-10-06 ENCOUNTER — PATIENT MESSAGE (OUTPATIENT)
Dept: BEHAVIORAL HEALTH | Facility: CLINIC | Age: 48
End: 2023-10-06
Payer: COMMERCIAL

## 2023-12-11 ENCOUNTER — PATIENT MESSAGE (OUTPATIENT)
Dept: INTERNAL MEDICINE | Facility: CLINIC | Age: 48
End: 2023-12-11
Payer: COMMERCIAL

## 2023-12-11 DIAGNOSIS — I10 HYPERTENSION, BENIGN: ICD-10-CM

## 2023-12-12 RX ORDER — HYDROCHLOROTHIAZIDE 12.5 MG/1
12.5 TABLET ORAL DAILY
Qty: 90 TABLET | Refills: 2 | Status: SHIPPED | OUTPATIENT
Start: 2023-12-12 | End: 2024-03-07 | Stop reason: SDUPTHER

## 2023-12-12 NOTE — TELEPHONE ENCOUNTER
Yes, recommend resuming hctz 12.5mg dose   Please arrange a follow up appt with me or another provider with availability in 2-4 weeks to f/u on pressure

## 2024-03-07 ENCOUNTER — LAB VISIT (OUTPATIENT)
Dept: LAB | Facility: OTHER | Age: 49
End: 2024-03-07
Attending: INTERNAL MEDICINE
Payer: COMMERCIAL

## 2024-03-07 ENCOUNTER — OFFICE VISIT (OUTPATIENT)
Dept: INTERNAL MEDICINE | Facility: CLINIC | Age: 49
End: 2024-03-07
Attending: INTERNAL MEDICINE
Payer: COMMERCIAL

## 2024-03-07 DIAGNOSIS — D64.9 ANEMIA, UNSPECIFIED TYPE: ICD-10-CM

## 2024-03-07 DIAGNOSIS — F43.23 ADJUSTMENT DISORDER WITH MIXED ANXIETY AND DEPRESSED MOOD: ICD-10-CM

## 2024-03-07 DIAGNOSIS — I10 HTN (HYPERTENSION), BENIGN: ICD-10-CM

## 2024-03-07 DIAGNOSIS — Z00.00 ANNUAL PHYSICAL EXAM: ICD-10-CM

## 2024-03-07 DIAGNOSIS — I10 HYPERTENSION, BENIGN: ICD-10-CM

## 2024-03-07 DIAGNOSIS — R11.0 NAUSEA: ICD-10-CM

## 2024-03-07 DIAGNOSIS — J30.9 ALLERGIC RHINITIS, UNSPECIFIED SEASONALITY, UNSPECIFIED TRIGGER: ICD-10-CM

## 2024-03-07 DIAGNOSIS — G43.009 MIGRAINE WITHOUT AURA AND WITHOUT STATUS MIGRAINOSUS, NOT INTRACTABLE: ICD-10-CM

## 2024-03-07 DIAGNOSIS — R73.01 IFG (IMPAIRED FASTING GLUCOSE): ICD-10-CM

## 2024-03-07 DIAGNOSIS — Z00.00 ANNUAL PHYSICAL EXAM: Primary | ICD-10-CM

## 2024-03-07 DIAGNOSIS — F41.9 ANXIETY: ICD-10-CM

## 2024-03-07 LAB
ALBUMIN SERPL BCP-MCNC: 4.4 G/DL (ref 3.5–5.2)
ALP SERPL-CCNC: 42 U/L (ref 55–135)
ALT SERPL W/O P-5'-P-CCNC: 14 U/L (ref 10–44)
ANION GAP SERPL CALC-SCNC: 7 MMOL/L (ref 8–16)
AST SERPL-CCNC: 18 U/L (ref 10–40)
BACTERIA #/AREA URNS AUTO: ABNORMAL /HPF
BASOPHILS # BLD AUTO: 0.04 K/UL (ref 0–0.2)
BASOPHILS NFR BLD: 0.8 % (ref 0–1.9)
BILIRUB SERPL-MCNC: 0.4 MG/DL (ref 0.1–1)
BILIRUB UR QL STRIP: NEGATIVE
BUN SERPL-MCNC: 13 MG/DL (ref 6–20)
CALCIUM SERPL-MCNC: 9.3 MG/DL (ref 8.7–10.5)
CHLORIDE SERPL-SCNC: 105 MMOL/L (ref 95–110)
CHOLEST SERPL-MCNC: 184 MG/DL (ref 120–199)
CHOLEST/HDLC SERPL: 3.3 {RATIO} (ref 2–5)
CLARITY UR REFRACT.AUTO: CLEAR
CO2 SERPL-SCNC: 25 MMOL/L (ref 23–29)
COLOR UR AUTO: YELLOW
CREAT SERPL-MCNC: 0.8 MG/DL (ref 0.5–1.4)
DIFFERENTIAL METHOD BLD: NORMAL
EOSINOPHIL # BLD AUTO: 0.1 K/UL (ref 0–0.5)
EOSINOPHIL NFR BLD: 2.3 % (ref 0–8)
ERYTHROCYTE [DISTWIDTH] IN BLOOD BY AUTOMATED COUNT: 12.6 % (ref 11.5–14.5)
EST. GFR  (NO RACE VARIABLE): >60 ML/MIN/1.73 M^2
ESTIMATED AVG GLUCOSE: 105 MG/DL (ref 68–131)
FERRITIN SERPL-MCNC: 116 NG/ML (ref 20–300)
FOLATE SERPL-MCNC: 9.6 NG/ML (ref 4–24)
GLUCOSE SERPL-MCNC: 91 MG/DL (ref 70–110)
GLUCOSE UR QL STRIP: NEGATIVE
HBA1C MFR BLD: 5.3 % (ref 4–5.6)
HCT VFR BLD AUTO: 38.5 % (ref 37–48.5)
HDLC SERPL-MCNC: 56 MG/DL (ref 40–75)
HDLC SERPL: 30.4 % (ref 20–50)
HGB BLD-MCNC: 12.5 G/DL (ref 12–16)
HGB UR QL STRIP: ABNORMAL
IMM GRANULOCYTES # BLD AUTO: 0.01 K/UL (ref 0–0.04)
IMM GRANULOCYTES NFR BLD AUTO: 0.2 % (ref 0–0.5)
IRON SERPL-MCNC: 139 UG/DL (ref 30–160)
KETONES UR QL STRIP: NEGATIVE
LDLC SERPL CALC-MCNC: 111.6 MG/DL (ref 63–159)
LEUKOCYTE ESTERASE UR QL STRIP: NEGATIVE
LYMPHOCYTES # BLD AUTO: 1.6 K/UL (ref 1–4.8)
LYMPHOCYTES NFR BLD: 29.8 % (ref 18–48)
MCH RBC QN AUTO: 28.2 PG (ref 27–31)
MCHC RBC AUTO-ENTMCNC: 32.5 G/DL (ref 32–36)
MCV RBC AUTO: 87 FL (ref 82–98)
MICROSCOPIC COMMENT: ABNORMAL
MONOCYTES # BLD AUTO: 0.6 K/UL (ref 0.3–1)
MONOCYTES NFR BLD: 11 % (ref 4–15)
NEUTROPHILS # BLD AUTO: 2.9 K/UL (ref 1.8–7.7)
NEUTROPHILS NFR BLD: 55.9 % (ref 38–73)
NITRITE UR QL STRIP: NEGATIVE
NONHDLC SERPL-MCNC: 128 MG/DL
NRBC BLD-RTO: 0 /100 WBC
PH UR STRIP: 7 [PH] (ref 5–8)
PLATELET # BLD AUTO: 286 K/UL (ref 150–450)
PMV BLD AUTO: 10.4 FL (ref 9.2–12.9)
POTASSIUM SERPL-SCNC: 4 MMOL/L (ref 3.5–5.1)
PROT SERPL-MCNC: 7.5 G/DL (ref 6–8.4)
PROT UR QL STRIP: NEGATIVE
RBC # BLD AUTO: 4.43 M/UL (ref 4–5.4)
RBC #/AREA URNS AUTO: 11 /HPF (ref 0–4)
SATURATED IRON: 40 % (ref 20–50)
SODIUM SERPL-SCNC: 137 MMOL/L (ref 136–145)
SP GR UR STRIP: 1.01 (ref 1–1.03)
SQUAMOUS #/AREA URNS AUTO: 1 /HPF
TOTAL IRON BINDING CAPACITY: 345 UG/DL (ref 250–450)
TRANSFERRIN SERPL-MCNC: 233 MG/DL (ref 200–375)
TRIGL SERPL-MCNC: 82 MG/DL (ref 30–150)
TSH SERPL DL<=0.005 MIU/L-ACNC: 1.16 UIU/ML (ref 0.4–4)
URN SPEC COLLECT METH UR: ABNORMAL
VIT B12 SERPL-MCNC: 266 PG/ML (ref 210–950)
WBC # BLD AUTO: 5.26 K/UL (ref 3.9–12.7)
WBC #/AREA URNS AUTO: 2 /HPF (ref 0–5)

## 2024-03-07 PROCEDURE — 82746 ASSAY OF FOLIC ACID SERUM: CPT | Performed by: INTERNAL MEDICINE

## 2024-03-07 PROCEDURE — 81001 URINALYSIS AUTO W/SCOPE: CPT | Performed by: INTERNAL MEDICINE

## 2024-03-07 PROCEDURE — 3078F DIAST BP <80 MM HG: CPT | Mod: CPTII,S$GLB,, | Performed by: INTERNAL MEDICINE

## 2024-03-07 PROCEDURE — 3044F HG A1C LEVEL LT 7.0%: CPT | Mod: CPTII,S$GLB,, | Performed by: INTERNAL MEDICINE

## 2024-03-07 PROCEDURE — 1160F RVW MEDS BY RX/DR IN RCRD: CPT | Mod: CPTII,S$GLB,, | Performed by: INTERNAL MEDICINE

## 2024-03-07 PROCEDURE — 80061 LIPID PANEL: CPT | Performed by: INTERNAL MEDICINE

## 2024-03-07 PROCEDURE — 82728 ASSAY OF FERRITIN: CPT | Performed by: INTERNAL MEDICINE

## 2024-03-07 PROCEDURE — 99999 PR PBB SHADOW E&M-EST. PATIENT-LVL III: CPT | Mod: PBBFAC,,, | Performed by: INTERNAL MEDICINE

## 2024-03-07 PROCEDURE — 99396 PREV VISIT EST AGE 40-64: CPT | Mod: S$GLB,,, | Performed by: INTERNAL MEDICINE

## 2024-03-07 PROCEDURE — 82607 VITAMIN B-12: CPT | Performed by: INTERNAL MEDICINE

## 2024-03-07 PROCEDURE — 36415 COLL VENOUS BLD VENIPUNCTURE: CPT | Performed by: INTERNAL MEDICINE

## 2024-03-07 PROCEDURE — 85025 COMPLETE CBC W/AUTO DIFF WBC: CPT | Performed by: INTERNAL MEDICINE

## 2024-03-07 PROCEDURE — 3074F SYST BP LT 130 MM HG: CPT | Mod: CPTII,S$GLB,, | Performed by: INTERNAL MEDICINE

## 2024-03-07 PROCEDURE — 1159F MED LIST DOCD IN RCRD: CPT | Mod: CPTII,S$GLB,, | Performed by: INTERNAL MEDICINE

## 2024-03-07 PROCEDURE — 80053 COMPREHEN METABOLIC PANEL: CPT | Performed by: INTERNAL MEDICINE

## 2024-03-07 PROCEDURE — 83036 HEMOGLOBIN GLYCOSYLATED A1C: CPT | Performed by: INTERNAL MEDICINE

## 2024-03-07 PROCEDURE — 3008F BODY MASS INDEX DOCD: CPT | Mod: CPTII,S$GLB,, | Performed by: INTERNAL MEDICINE

## 2024-03-07 PROCEDURE — 84443 ASSAY THYROID STIM HORMONE: CPT | Performed by: INTERNAL MEDICINE

## 2024-03-07 PROCEDURE — 83540 ASSAY OF IRON: CPT | Performed by: INTERNAL MEDICINE

## 2024-03-07 RX ORDER — ONDANSETRON 8 MG/1
8 TABLET, ORALLY DISINTEGRATING ORAL EVERY 12 HOURS PRN
Qty: 30 TABLET | Refills: 2 | Status: SHIPPED | OUTPATIENT
Start: 2024-03-07

## 2024-03-07 RX ORDER — SERTRALINE HYDROCHLORIDE 100 MG/1
100 TABLET, FILM COATED ORAL DAILY
Qty: 90 TABLET | Refills: 3 | Status: SHIPPED | OUTPATIENT
Start: 2024-03-07 | End: 2025-03-07

## 2024-03-07 RX ORDER — AZELASTINE 1 MG/ML
1 SPRAY, METERED NASAL 2 TIMES DAILY
Qty: 30 ML | Refills: 11 | Status: SHIPPED | OUTPATIENT
Start: 2024-03-07 | End: 2025-03-07

## 2024-03-07 RX ORDER — HYDROCHLOROTHIAZIDE 12.5 MG/1
12.5 TABLET ORAL DAILY
Qty: 90 TABLET | Refills: 3 | Status: SHIPPED | OUTPATIENT
Start: 2024-03-07

## 2024-03-07 RX ORDER — SUMATRIPTAN SUCCINATE 100 MG/1
TABLET ORAL
Qty: 18 TABLET | Refills: 11 | Status: SHIPPED | OUTPATIENT
Start: 2024-03-07 | End: 2024-05-06 | Stop reason: SDUPTHER

## 2024-03-07 NOTE — PROGRESS NOTES
Subjective:   Patient ID: Lu Lopez is a 48 y.o. female  Chief complaint:   Chief Complaint   Patient presents with    Annual Exam     Bp f/u     HPI    Here for annual exam   Exercising consistently     Mom passed away last year and friend recently passed as well   Has good fam support  Poor sleep  Trazodone can be helpful  Watching tv before bed   Exercising and yoga  At v:   Overwhelmed at times - Everything feels harder   Exercising 3 days per week - cardio and yoga   Feel in a rut  Mom not doing well medically - she is her caregiver   Not in counseling   Daughter is on zoloft and was on prozac for 3 years and was not helping   - no si/hi/snider  Anxiety:  - too flat on lexapro in past - manageable at this time     Migraines:   Trigger is poor sleep and period  + nausea with this at times - zofran helpful prn   Prev:   - improved after switching ocps in past  - seen by neuro and now on elavil 50mg and will only get migraine if very stressed or after a few days of poor sleep   - migraines are mild at this time     HTN:   - isolated elevated reading in Jan was when dad used cuff  - readings ok - 2 lower readings upon review  - bp at goal   - in dig htn program  - bianca med on med card     Vit d def:  - not c/w otc vit d suppl    Abn liver us:   Seen by hep 2/2/203  To have MRI and due for f/u     High risk for breast cancer:   - MRI breast to be arranged at [facility]  Studies ordered through gyn at [facility]  Prev:   mmg ordered through gyn at [facility] 6/2021 and breast MRI 12/2021 - [facility]    Insomnia:  - has periods when sx wax and wane - trazodone helpful prn  Previously:   Taking elavil 25mg and not helpful for sleep   Tried nyquil, benadryl, melatonin and ineffective   - exercising as above     Jennifer:   Mild - stopped oral iron at end of last year due to constipation even with colace   - periods intermittent - spotting mild     HM:  Pap  Covid booster   Flu vaccine     Gyn: [facility]   Hep:     Review of  "Systems  120/65  Objective:  Vitals:    03/07/24 0717 03/07/24 1348   BP: (!) 140/88 120/65   BP Location: Left arm    Patient Position: Sitting    Pulse: 86    SpO2: 99%    Weight: 66.3 kg (146 lb 2.6 oz)    Height: 5' 7" (1.702 m)      Body mass index is 22.89 kg/m².    Physical Exam  Vitals reviewed.   Constitutional:       Appearance: Normal appearance. She is well-developed.   HENT:      Head: Normocephalic and atraumatic.      Right Ear: Tympanic membrane, ear canal and external ear normal.      Left Ear: Tympanic membrane, ear canal and external ear normal.      Nose: Congestion present.      Comments: Pale nasal turbinates     Mouth/Throat:      Mouth: Mucous membranes are moist.      Pharynx: Oropharynx is clear. No oropharyngeal exudate.   Eyes:      Extraocular Movements: Extraocular movements intact.      Conjunctiva/sclera: Conjunctivae normal.   Neck:      Thyroid: No thyromegaly.   Cardiovascular:      Rate and Rhythm: Normal rate and regular rhythm.      Pulses: Normal pulses.      Heart sounds: Normal heart sounds.   Pulmonary:      Effort: Pulmonary effort is normal.      Breath sounds: Normal breath sounds.   Abdominal:      General: Bowel sounds are normal.      Palpations: Abdomen is soft.   Musculoskeletal:         General: No swelling or tenderness.      Cervical back: Neck supple.   Lymphadenopathy:      Cervical: No cervical adenopathy.   Skin:     General: Skin is warm and dry.      Capillary Refill: Capillary refill takes less than 2 seconds.   Neurological:      General: No focal deficit present.      Mental Status: She is alert and oriented to person, place, and time. Mental status is at baseline.   Psychiatric:         Behavior: Behavior normal.         Thought Content: Thought content normal.     Left ear - clear effusion - small - no redness or bulging     Assessment:  1. Annual physical exam    2. HTN (hypertension), benign    3. Adjustment disorder with mixed anxiety and depressed " mood    4. Migraine without aura and without status migrainosus, not intractable    5. Anxiety    6. Hypertension, benign    7. Nausea    8. Anemia, unspecified type    9. IFG (impaired fasting glucose)    10. Allergic rhinitis, unspecified seasonality, unspecified trigger          Plan:  Lu was seen today for annual exam.    Diagnoses and all orders for this visit:    Annual physical exam  -     CBC Auto Differential; Future  -     FERRITIN; Future  -     IRON AND TIBC; Future  -     FOLATE; Future  -     Vitamin B12; Future  -     Cancel: Urinalysis, Reflex to Urine Culture Urine, Clean Catch  -     Lipid Panel; Future  -     Hemoglobin A1C; Future  -     TSH; Future  -     Comprehensive Metabolic Panel; Future  -     Cancel: Urinalysis, Reflex to Urine Culture Urine, Clean Catch; Future  -     Urinalysis, Reflex to Urine Culture Urine, Clean Catch    HTN (hypertension), benign    Adjustment disorder with mixed anxiety and depressed mood    Migraine without aura and without status migrainosus, not intractable  -     sumatriptan (IMITREX) 100 MG tablet; Do not exceed max 200mg/24h    Anxiety  -     sertraline (ZOLOFT) 100 MG tablet; Take 1 tablet (100 mg total) by mouth once daily.    Hypertension, benign  -     hydroCHLOROthiazide (HYDRODIURIL) 12.5 MG Tab; Take 1 tablet (12.5 mg total) by mouth once daily.    Nausea  -     ondansetron (ZOFRAN-ODT) 8 MG TbDL; Take 1 tablet (8 mg total) by mouth every 12 (twelve) hours as needed (nausea).    Anemia, unspecified type  -     CBC Auto Differential; Future  -     FERRITIN; Future  -     IRON AND TIBC; Future  -     FOLATE; Future  -     Vitamin B12; Future    IFG (impaired fasting glucose)  -     Hemoglobin A1C; Future    Allergic rhinitis, unspecified seasonality, unspecified trigger  -     azelastine (ASTELIN) 137 mcg (0.1 %) nasal spray; 1 spray (137 mcg total) by Nasal route 2 (two) times daily.    Other orders  -     Urinalysis Microscopic      Check approp  labs   Recommend daily sunscreen, cardiovascular exercise min 30 min 5 days per week. Seatbelts routinely.    Consider sleep eval +/- sleep training/counseling  Fu with hep for mri     Health Maintenance   Topic Date Due    Mammogram  08/31/2024    TETANUS VACCINE  01/02/2029    Lipid Panel  03/07/2029    Colorectal Cancer Screening  03/25/2032    Hepatitis C Screening  Completed

## 2024-03-10 VITALS
OXYGEN SATURATION: 99 % | WEIGHT: 146.19 LBS | BODY MASS INDEX: 22.94 KG/M2 | SYSTOLIC BLOOD PRESSURE: 120 MMHG | HEART RATE: 86 BPM | HEIGHT: 67 IN | DIASTOLIC BLOOD PRESSURE: 65 MMHG

## 2024-03-11 ENCOUNTER — PATIENT MESSAGE (OUTPATIENT)
Dept: INTERNAL MEDICINE | Facility: CLINIC | Age: 49
End: 2024-03-11
Payer: COMMERCIAL

## 2024-03-11 DIAGNOSIS — K52.839 MICROSCOPIC COLITIS, UNSPECIFIED MICROSCOPIC COLITIS TYPE: Primary | ICD-10-CM

## 2024-03-14 ENCOUNTER — LAB VISIT (OUTPATIENT)
Dept: LAB | Facility: OTHER | Age: 49
End: 2024-03-14
Attending: INTERNAL MEDICINE
Payer: COMMERCIAL

## 2024-03-14 ENCOUNTER — PATIENT MESSAGE (OUTPATIENT)
Dept: INTERNAL MEDICINE | Facility: CLINIC | Age: 49
End: 2024-03-14
Payer: COMMERCIAL

## 2024-03-14 DIAGNOSIS — K52.839 MICROSCOPIC COLITIS, UNSPECIFIED MICROSCOPIC COLITIS TYPE: ICD-10-CM

## 2024-03-14 LAB
BACTERIA #/AREA URNS HPF: ABNORMAL /HPF
BILIRUB UR QL STRIP: NEGATIVE
CLARITY UR: CLEAR
COLOR UR: YELLOW
GLUCOSE UR QL STRIP: NEGATIVE
HGB UR QL STRIP: ABNORMAL
HYALINE CASTS #/AREA URNS LPF: 1 /LPF
KETONES UR QL STRIP: NEGATIVE
LEUKOCYTE ESTERASE UR QL STRIP: NEGATIVE
MICROSCOPIC COMMENT: ABNORMAL
NITRITE UR QL STRIP: NEGATIVE
PH UR STRIP: 6 [PH] (ref 5–8)
PROT UR QL STRIP: ABNORMAL
RBC #/AREA URNS HPF: 8 /HPF (ref 0–4)
SP GR UR STRIP: 1.03 (ref 1–1.03)
SQUAMOUS #/AREA URNS HPF: 9 /HPF
URN SPEC COLLECT METH UR: ABNORMAL
UROBILINOGEN UR STRIP-ACNC: NEGATIVE EU/DL
WBC #/AREA URNS HPF: 1 /HPF (ref 0–5)

## 2024-03-14 PROCEDURE — 87186 SC STD MICRODIL/AGAR DIL: CPT | Performed by: INTERNAL MEDICINE

## 2024-03-14 PROCEDURE — 81000 URINALYSIS NONAUTO W/SCOPE: CPT | Performed by: INTERNAL MEDICINE

## 2024-03-14 PROCEDURE — 87077 CULTURE AEROBIC IDENTIFY: CPT | Performed by: INTERNAL MEDICINE

## 2024-03-14 PROCEDURE — 87088 URINE BACTERIA CULTURE: CPT | Performed by: INTERNAL MEDICINE

## 2024-03-14 PROCEDURE — 87086 URINE CULTURE/COLONY COUNT: CPT | Performed by: INTERNAL MEDICINE

## 2024-03-17 LAB — BACTERIA UR CULT: ABNORMAL

## 2024-03-18 ENCOUNTER — PATIENT MESSAGE (OUTPATIENT)
Dept: INTERNAL MEDICINE | Facility: CLINIC | Age: 49
End: 2024-03-18
Payer: COMMERCIAL

## 2024-03-18 DIAGNOSIS — N30.01 ACUTE CYSTITIS WITH HEMATURIA: Primary | ICD-10-CM

## 2024-03-18 RX ORDER — CEPHALEXIN 500 MG/1
500 CAPSULE ORAL EVERY 6 HOURS
Qty: 28 CAPSULE | Refills: 0 | Status: SHIPPED | OUTPATIENT
Start: 2024-03-18 | End: 2024-03-25

## 2024-04-12 ENCOUNTER — HOSPITAL ENCOUNTER (EMERGENCY)
Facility: HOSPITAL | Age: 49
Discharge: HOME | End: 2024-04-12
Attending: STUDENT IN AN ORGANIZED HEALTH CARE EDUCATION/TRAINING PROGRAM
Payer: COMMERCIAL

## 2024-04-12 VITALS
HEART RATE: 73 BPM | DIASTOLIC BLOOD PRESSURE: 77 MMHG | BODY MASS INDEX: 22.76 KG/M2 | HEIGHT: 67 IN | SYSTOLIC BLOOD PRESSURE: 123 MMHG | TEMPERATURE: 98 F | OXYGEN SATURATION: 99 % | RESPIRATION RATE: 16 BRPM | WEIGHT: 145 LBS

## 2024-04-12 DIAGNOSIS — R07.9 CHEST PAIN: Primary | ICD-10-CM

## 2024-04-12 LAB
ALBUMIN SERPL BCP-MCNC: 4.4 G/DL (ref 3.5–5.2)
ALP SERPL-CCNC: 36 U/L (ref 55–135)
ALT SERPL W/O P-5'-P-CCNC: 12 U/L (ref 10–44)
ANION GAP SERPL CALC-SCNC: 12 MMOL/L (ref 8–16)
AST SERPL-CCNC: 19 U/L (ref 10–40)
BASOPHILS # BLD AUTO: 0.06 K/UL (ref 0–0.2)
BASOPHILS NFR BLD: 0.7 % (ref 0–1.9)
BILIRUB SERPL-MCNC: 0.4 MG/DL (ref 0.1–1)
BNP SERPL-MCNC: 29 PG/ML (ref 0–99)
BUN SERPL-MCNC: 15 MG/DL (ref 6–20)
CALCIUM SERPL-MCNC: 9.7 MG/DL (ref 8.7–10.5)
CHLORIDE SERPL-SCNC: 104 MMOL/L (ref 95–110)
CO2 SERPL-SCNC: 22 MMOL/L (ref 23–29)
CREAT SERPL-MCNC: 0.8 MG/DL (ref 0.5–1.4)
DIFFERENTIAL METHOD BLD: NORMAL
EOSINOPHIL # BLD AUTO: 0.2 K/UL (ref 0–0.5)
EOSINOPHIL NFR BLD: 1.8 % (ref 0–8)
ERYTHROCYTE [DISTWIDTH] IN BLOOD BY AUTOMATED COUNT: 13.3 % (ref 11.5–14.5)
EST. GFR  (NO RACE VARIABLE): >60 ML/MIN/1.73 M^2
GLUCOSE SERPL-MCNC: 99 MG/DL (ref 70–110)
HCT VFR BLD AUTO: 38 % (ref 37–48.5)
HGB BLD-MCNC: 12.3 G/DL (ref 12–16)
IMM GRANULOCYTES # BLD AUTO: 0.02 K/UL (ref 0–0.04)
IMM GRANULOCYTES NFR BLD AUTO: 0.2 % (ref 0–0.5)
LYMPHOCYTES # BLD AUTO: 2.6 K/UL (ref 1–4.8)
LYMPHOCYTES NFR BLD: 29.7 % (ref 18–48)
MCH RBC QN AUTO: 28 PG (ref 27–31)
MCHC RBC AUTO-ENTMCNC: 32.4 G/DL (ref 32–36)
MCV RBC AUTO: 86 FL (ref 82–98)
MONOCYTES # BLD AUTO: 0.8 K/UL (ref 0.3–1)
MONOCYTES NFR BLD: 8.9 % (ref 4–15)
NEUTROPHILS # BLD AUTO: 5.1 K/UL (ref 1.8–7.7)
NEUTROPHILS NFR BLD: 58.7 % (ref 38–73)
NRBC BLD-RTO: 0 /100 WBC
OHS QRS DURATION: 80 MS
OHS QRS DURATION: 84 MS
OHS QTC CALCULATION: 421 MS
OHS QTC CALCULATION: 426 MS
PLATELET # BLD AUTO: 314 K/UL (ref 150–450)
PMV BLD AUTO: 10.4 FL (ref 9.2–12.9)
POC CARDIAC TROPONIN I: 0 NG/ML (ref 0–0.08)
POTASSIUM SERPL-SCNC: 3.7 MMOL/L (ref 3.5–5.1)
PROT SERPL-MCNC: 7.6 G/DL (ref 6–8.4)
RBC # BLD AUTO: 4.4 M/UL (ref 4–5.4)
SAMPLE: NORMAL
SODIUM SERPL-SCNC: 138 MMOL/L (ref 136–145)
TROPONIN I SERPL DL<=0.01 NG/ML-MCNC: <0.006 NG/ML (ref 0–0.03)
TROPONIN I SERPL DL<=0.01 NG/ML-MCNC: <0.006 NG/ML (ref 0–0.03)
WBC # BLD AUTO: 8.76 K/UL (ref 3.9–12.7)

## 2024-04-12 PROCEDURE — 93005 ELECTROCARDIOGRAM TRACING: CPT

## 2024-04-12 PROCEDURE — 93010 ELECTROCARDIOGRAM REPORT: CPT | Mod: ,,, | Performed by: INTERNAL MEDICINE

## 2024-04-12 PROCEDURE — 84484 ASSAY OF TROPONIN QUANT: CPT

## 2024-04-12 PROCEDURE — 85025 COMPLETE CBC W/AUTO DIFF WBC: CPT | Performed by: PHYSICIAN ASSISTANT

## 2024-04-12 PROCEDURE — 83880 ASSAY OF NATRIURETIC PEPTIDE: CPT | Performed by: PHYSICIAN ASSISTANT

## 2024-04-12 PROCEDURE — 80053 COMPREHEN METABOLIC PANEL: CPT | Performed by: PHYSICIAN ASSISTANT

## 2024-04-12 PROCEDURE — 25000003 PHARM REV CODE 250: Performed by: PHYSICIAN ASSISTANT

## 2024-04-12 PROCEDURE — 99285 EMERGENCY DEPT VISIT HI MDM: CPT | Mod: 25

## 2024-04-12 PROCEDURE — 84484 ASSAY OF TROPONIN QUANT: CPT | Mod: 91 | Performed by: PHYSICIAN ASSISTANT

## 2024-04-12 RX ORDER — SUCRALFATE 1 G/10ML
1 SUSPENSION ORAL
Status: COMPLETED | OUTPATIENT
Start: 2024-04-12 | End: 2024-04-12

## 2024-04-12 RX ORDER — ALPRAZOLAM 0.25 MG/1
1 TABLET ORAL
Status: COMPLETED | OUTPATIENT
Start: 2024-04-12 | End: 2024-04-12

## 2024-04-12 RX ORDER — ASPIRIN 325 MG
325 TABLET ORAL
Status: COMPLETED | OUTPATIENT
Start: 2024-04-12 | End: 2024-04-12

## 2024-04-12 RX ADMIN — ASPIRIN 325 MG ORAL TABLET 325 MG: 325 PILL ORAL at 11:04

## 2024-04-12 RX ADMIN — ALPRAZOLAM 1 MG: 0.25 TABLET ORAL at 11:04

## 2024-04-12 RX ADMIN — SUCRALFATE 1 G: 1 SUSPENSION ORAL at 11:04

## 2024-04-12 NOTE — ED PROVIDER NOTES
"Encounter Date: 2024       History     Chief Complaint   Patient presents with    Chest Pain     Acute onset this morning, 24. Intermittent mid-sternal cp. Denies cardiac hx     48-year-old female with history of endometriosis presents to the ED with chief complaint of chest pain that began this morning around 6:30 AM.  Initially, her pain was intermittent.  She reports about 4 episodes of pain.  Each lasting a few seconds at a time before resolving.  About 1 hour prior to, her pain became more constant.  She endorses a dull achy pain but states that she is occasionally having the sharp and burning pain as well.  She does endorse shortness of breath but states that she is unsure if it is because she is "freaking out. " She does endorse feeling lightheadedness earlier today.  Her pain does not radiate.  She denies nausea or vomiting.  Drinks alcohol occasionally.  Denies drug or cigarette use.  Denies symptoms of this nature in the past.  Denies other worsening or alleviating factors.      Review of patient's allergies indicates:  No Known Allergies  Past Medical History:   Diagnosis Date    Endometriosis     Hypertension      Past Surgical History:   Procedure Laterality Date    DILATION AND CURETTAGE OF UTERUS      PELVIC LAPAROSCOPY       Family History   Problem Relation Age of Onset    Fibroids Mother     Hypertension Mother     Depression Mother     Alcohol abuse Mother     Hypertension Father     Hyperlipidemia Father     Heart disease Father         CAD s/p stents    No Known Problems Sister     No Known Problems Brother     No Known Problems Daughter     No Known Problems Sister     Diabetes Daughter         Type 1 diabetes    Depression Daughter     No Known Problems Daughter      Social History     Tobacco Use    Smoking status: Former     Current packs/day: 0.00     Types: Cigarettes     Quit date: 10/20/1995     Years since quittin.4     Passive exposure: Past    Smokeless tobacco: Never "    Tobacco comments:     social tob in college x couple of years   Substance Use Topics    Alcohol use: Yes     Alcohol/week: 3.0 - 4.0 standard drinks of alcohol     Types: 3 - 4 Drinks containing 0.5 oz of alcohol per week     Comment: socially    Drug use: No     Review of Systems   Cardiovascular:  Positive for chest pain.       Physical Exam     Initial Vitals [04/12/24 1056]   BP Pulse Resp Temp SpO2   (!) 177/91 77 20 98.1 °F (36.7 °C) 100 %      MAP       --         Physical Exam    Vitals reviewed.  Constitutional: She appears well-developed and well-nourished. She is not diaphoretic. No distress.   HENT:   Head: Normocephalic and atraumatic.   Mouth/Throat: Oropharynx is clear and moist.   Eyes: Conjunctivae and EOM are normal. Pupils are equal, round, and reactive to light.   Neck: Neck supple.   Normal range of motion.  Cardiovascular:  Normal rate, regular rhythm, normal heart sounds and intact distal pulses.     Exam reveals no gallop and no friction rub.       No murmur heard.  Pulmonary/Chest: Breath sounds normal. She has no wheezes. She has no rhonchi. She has no rales.   Abdominal: Abdomen is soft. Bowel sounds are normal. There is no abdominal tenderness. There is no rebound and no guarding.   Musculoskeletal:         General: Normal range of motion.      Cervical back: Normal range of motion and neck supple.     Neurological: She is alert and oriented to person, place, and time. She has normal strength. No cranial nerve deficit or sensory deficit. GCS score is 15. GCS eye subscore is 4. GCS verbal subscore is 5. GCS motor subscore is 6.   Skin: Skin is warm and dry. Capillary refill takes less than 2 seconds.   Psychiatric: Her behavior is normal. Judgment and thought content normal. Her mood appears anxious.         ED Course   Procedures  Labs Reviewed   COMPREHENSIVE METABOLIC PANEL - Abnormal; Notable for the following components:       Result Value    CO2 22 (*)     Alkaline Phosphatase 36  (*)     All other components within normal limits   CBC W/ AUTO DIFFERENTIAL   TROPONIN I   TROPONIN I   B-TYPE NATRIURETIC PEPTIDE   POCT TROPONIN   POCT TROPONIN     EKG Readings: (Independently Interpreted)   Initial Reading: No STEMI. Rhythm: Normal Sinus Rhythm. Heart Rate: 70.     ECG Results              EKG 12-lead (Final result)        Collection Time Result Time QRS Duration OHS QTC Calculation    04/12/24 11:37:47 04/12/24 14:56:22 80 421                     Final result by Interface, Lab In [facility] (04/12/24 14:56:30)                   Narrative:    Test Reason : R07.9,    Vent. Rate : 070 BPM     Atrial Rate : 070 BPM     P-R Int : 132 ms          QRS Dur : 080 ms      QT Int : 390 ms       P-R-T Axes : 078 074 043 degrees     QTc Int : 421 ms    Normal sinus rhythm  Normal ECG  When compared with ECG of 12-APR-2024 10:50,  No significant change was found  Confirmed by Dion ROBLEDO MD ([id number]) on 4/12/2024 2:56:19 PM    Referred By: [email or web address]   SELF           Confirmed By:Dion ROBLEDO MD                                  Imaging Results              X-Ray Chest PA And Lateral (Final result)  Result time 04/12/24 12:26:21      Final result by Noble De Paz MD (04/12/24 12:26:21)                   Impression:      See above      Electronically signed by: Noble De Paz MD  Date:    04/12/2024  Time:    12:26               Narrative:    EXAMINATION:  XR CHEST PA AND LATERAL    CLINICAL HISTORY:  Chest Pain;    TECHNIQUE:  PA and lateral views of the chest were performed.    COMPARISON:  None    FINDINGS:  Heart size normal.  The lungs are clear.  No pleural effusion                                    X-Rays:   Independently Interpreted Readings:   Chest X-Ray: Normal heart size.  No infiltrates.  No acute abnormalities.     Medications   aspirin tablet 325 mg (325 mg Oral Given 4/12/24 1156)   sucralfate 100 mg/mL suspension 1 g (1 g Oral Given 4/12/24 1158)   ALPRAZolam tablet 1 mg (1 mg Oral Given 4/12/24 1156)      Medical Decision Making  Emergent evaluation of a 48 y.o. female presenting to the emergency department complaining of chest pain that began this morning. Patient is afebrile, hemodynamically stable, and non toxic appearing.   Will order initiate cardiac workup.    Differential diagnosis includes but isn't limited to ACS, GERD, esophageal rupture, pneumothorax.     Patient presenting with substernal chest pain, nonradiating, nonexertional that began morning at 6:30 a.m..  She reports several episodes of intermittent pain.  Her pain began relatively constant about 1 hour prior to arrival.  She states that it now feels like a dull, achy pain but that she does continue to have the intermittent sharp and burning pain as well.  She feels short of breath.  She also endorses feeling lightheaded earlier today.    Shortly after I leave the room, I can hear the patient start to cry. I am told by the nurse that she is having tingling to her face and bilateral hands. When I return to the room, the patient is crying and appears anxious. She states that she has had panic attacks in the past, but never experienced the tingling associated with it.  She is neurovascularly intact and has no focal deficits.  I suspect this may be due to an anxiety attack.  She is agreeable to add on Xanax.    No severe metabolic or hematologic derangements.  Troponin negative x2.  BNP within normal limits.  Chest x-ray without acute abnormality.  On reassessment, patient reports improvement of her symptoms with ED management.  She states that she does feel fatigued from the Xanax.   is here and will drive her home.  Workup is reassuring, however the etiology of her chest pain remains unclear.  I recommend outpatient cardiology and PCP follow-up.  Return precautions given.  All questions answered.  The patient was instructed to follow up with a primary care provider and cardiology or to return to the emergency department for worsening  symptoms. The treatment plan was discussed with the patient who demonstrated understanding and comfort with plan. The patient's history, physical exam, and plan of care was discussed with and agreed upon with my supervising physician.                ED Course as of 04/12/24 1557   Fri Apr 12, 2024   1302 WBC: 8.76 [JM]   1302 Hemoglobin: 12.3 [JM]   1302 Hematocrit: 38.0 [JM]   1302 Platelet Count: 314 [JM]   1302 Troponin I: <0.006 [JM]   1302 BNP: 29 [JM]   1340 BUN: 15 [JM]   1341 Creatinine: 0.8 [JM]      ED Course User Index  [JM] Samantha Flores PA-C                           Clinical Impression:  Final diagnoses:  [R07.9] Chest pain (Primary)          ED Disposition Condition    Discharge Stable          ED Prescriptions    None       Follow-up Information       Follow up With Specialties Details Why Contact Info Additional Information    [facility] - Emergency Dept Emergency Medicine Go to  If symptoms worsen [address] [facility] [id number] [phone]     Shawnee Cisneros MD Internal Medicine Schedule an appointment as soon as possible for a visit   [address]  [facility] [address]  [facility] [address]  [phone]       [facility] Delarosa - Cardiology - [facility] Cardiology Schedule an appointment as soon as possible for a visit   [address]  [facility] [id number] [phone] Cardiology Services Clinics - 3rd floor             Samantha Flores PA-C  04/12/24 5364

## 2024-04-12 NOTE — ED NOTES
Patient is experiencing numbness and tingling to bilateral upper extremities and face, patient is also crying. Hx of panic attacks, but patient states she hasn't had them in a long time. JOHNNIE Flores is peralta.

## 2024-04-12 NOTE — DISCHARGE INSTRUCTIONS
Your workup today is very reassuring. Your cardiac enzymes are within normal limits. Please follow up with cardiology and your primary doctor or return to the ER for any new or worsening symptoms.

## 2024-04-12 NOTE — ED TRIAGE NOTES
"Lu Lopez, a 48 y.o. female presents to the ED w/ complaint of chest pain. Pt states the chest pain started this morning when she woke up and has been intermittent. She describes the pain as a "dull aching, squeezing and burning"  She also reports feeling lightheaded. States earlier this week she was nauseous but that has subsided. Denies abdominal pain, sob.    Triage note:  Chief Complaint   Patient presents with    Chest Pain     Acute onset this morning, 4/12/24. Intermittent mid-sternal cp. Denies cardiac hx     Review of patient's allergies indicates:  No Known Allergies  Past Medical History:   Diagnosis Date    Endometriosis       "

## 2024-04-17 ENCOUNTER — PATIENT MESSAGE (OUTPATIENT)
Dept: INTERNAL MEDICINE | Facility: CLINIC | Age: 49
End: 2024-04-17
Payer: COMMERCIAL

## 2024-04-17 DIAGNOSIS — G47.00 INSOMNIA, UNSPECIFIED TYPE: ICD-10-CM

## 2024-04-17 RX ORDER — TRAZODONE HYDROCHLORIDE 150 MG/1
150 TABLET ORAL NIGHTLY PRN
Qty: 90 TABLET | Refills: 1 | Status: SHIPPED | OUTPATIENT
Start: 2024-04-17

## 2024-04-17 NOTE — TELEPHONE ENCOUNTER
No care due was identified.  Health [facility] Embedded Care Due Messages. Reference number: [id number].   4/17/2024 12:27:27 PM CDT

## 2024-05-06 DIAGNOSIS — G43.009 MIGRAINE WITHOUT AURA AND WITHOUT STATUS MIGRAINOSUS, NOT INTRACTABLE: ICD-10-CM

## 2024-05-06 RX ORDER — SUMATRIPTAN SUCCINATE 100 MG/1
TABLET ORAL
Qty: 18 TABLET | Refills: 11 | Status: SHIPPED | OUTPATIENT
Start: 2024-05-06

## 2024-05-06 NOTE — TELEPHONE ENCOUNTER
No care due was identified.  Health [facility] Embedded Care Due Messages. Reference number: [id number].   5/06/2024 12:29:39 PM CDT

## 2024-05-20 ENCOUNTER — PATIENT MESSAGE (OUTPATIENT)
Dept: INTERNAL MEDICINE | Facility: CLINIC | Age: 49
End: 2024-05-20
Payer: COMMERCIAL

## 2024-05-20 NOTE — TELEPHONE ENCOUNTER
Patient sent portal message stating she had ear pain and wondered if she should go to urgent care.  Spoke with her and she decided on an appointment tomorrow 5/21/24 at [facility]. Patient satisfied with all.

## 2024-05-21 ENCOUNTER — OFFICE VISIT (OUTPATIENT)
Dept: INTERNAL MEDICINE | Facility: CLINIC | Age: 49
End: 2024-05-21
Payer: COMMERCIAL

## 2024-05-21 VITALS
HEART RATE: 73 BPM | BODY MASS INDEX: 23.91 KG/M2 | WEIGHT: 152.31 LBS | DIASTOLIC BLOOD PRESSURE: 76 MMHG | OXYGEN SATURATION: 100 % | SYSTOLIC BLOOD PRESSURE: 122 MMHG | HEIGHT: 67 IN

## 2024-05-21 DIAGNOSIS — H60.502 ACUTE OTITIS EXTERNA OF LEFT EAR, UNSPECIFIED TYPE: Primary | ICD-10-CM

## 2024-05-21 PROCEDURE — 3044F HG A1C LEVEL LT 7.0%: CPT | Mod: CPTII,S$GLB,, | Performed by: NURSE PRACTITIONER

## 2024-05-21 PROCEDURE — 1160F RVW MEDS BY RX/DR IN RCRD: CPT | Mod: CPTII,S$GLB,, | Performed by: NURSE PRACTITIONER

## 2024-05-21 PROCEDURE — 3008F BODY MASS INDEX DOCD: CPT | Mod: CPTII,S$GLB,, | Performed by: NURSE PRACTITIONER

## 2024-05-21 PROCEDURE — 99213 OFFICE O/P EST LOW 20 MIN: CPT | Mod: S$GLB,,, | Performed by: NURSE PRACTITIONER

## 2024-05-21 PROCEDURE — 3074F SYST BP LT 130 MM HG: CPT | Mod: CPTII,S$GLB,, | Performed by: NURSE PRACTITIONER

## 2024-05-21 PROCEDURE — 99999 PR PBB SHADOW E&M-EST. PATIENT-LVL IV: CPT | Mod: PBBFAC,,, | Performed by: NURSE PRACTITIONER

## 2024-05-21 PROCEDURE — 3078F DIAST BP <80 MM HG: CPT | Mod: CPTII,S$GLB,, | Performed by: NURSE PRACTITIONER

## 2024-05-21 PROCEDURE — 1159F MED LIST DOCD IN RCRD: CPT | Mod: CPTII,S$GLB,, | Performed by: NURSE PRACTITIONER

## 2024-05-21 RX ORDER — NEOMYCIN SULFATE, POLYMYXIN B SULFATE AND HYDROCORTISONE 10; 3.5; 1 MG/ML; MG/ML; [USP'U]/ML
4 SUSPENSION/ DROPS AURICULAR (OTIC) 3 TIMES DAILY
Qty: 10 ML | Refills: 0 | Status: SHIPPED | OUTPATIENT
Start: 2024-05-21 | End: 2024-05-28

## 2024-05-21 NOTE — PROGRESS NOTES
"Subjective     Patient ID: Lu Lopez is a 48 y.o. female.  /76 (BP Location: Left arm, Patient Position: Sitting)   Pulse 73   Ht 5' 7" (1.702 m)   Wt 69.1 kg (152 lb 5.4 oz)   SpO2 100%   BMI 23.86 kg/m²      Chief Complaint: Otalgia    Presenting for acute L ear pain that began over the past few days. Has had problems with fluid in ears for a few months/has allergies. Has tried decongestants and ibuprofen without relief. States the ear canal feels sore. Has not noted any drainage. Also feels nauseas and dizzy when she gets in the car. No fever.       Patient Active Problem List   Diagnosis    Anxiety    HTN (hypertension), benign    Insomnia    Migraine with aura and without status migrainosus, not intractable    Endometriosis    Palpitations    Shortness of breath on exertion    Adjustment disorder with mixed anxiety and depressed mood    PUMA (generalized anxiety disorder)        Current Outpatient Medications   Medication Sig Dispense Refill    azelastine (ASTELIN) 137 mcg (0.1 %) nasal spray 1 spray (137 mcg total) by Nasal route 2 (two) times daily. 30 mL 11    ergocalciferol, vitamin D2, (VITAMIN D ORAL) Take by mouth.      hydroCHLOROthiazide (HYDRODIURIL) 12.5 MG Tab Take 1 tablet (12.5 mg total) by mouth once daily. 90 tablet 3    norethindrone (MICRONOR) 0.35 mg tablet       norethindrone (MICRONOR) 0.35 mg tablet Take 1 tablet by mouth once daily.      norethindrone (MICRONOR) 0.35 mg tablet Take 1 tablet by mouth once daily.      ondansetron (ZOFRAN-ODT) 8 MG TbDL Take 1 tablet (8 mg total) by mouth every 12 (twelve) hours as needed (nausea). 30 tablet 2    sertraline (ZOLOFT) 100 MG tablet Take 1 tablet (100 mg total) by mouth once daily. 90 tablet 3    sumatriptan (IMITREX) 100 MG tablet Do not exceed max 200mg/24h 18 tablet 11    traZODone (DESYREL) 150 MG tablet Take 1 tablet (150 mg total) by mouth nightly as needed for Insomnia. 90 tablet 1    neomycin-polymyxin-hydrocortisone " (CORTISPORIN) 3.5-10,000-1 mg/mL-unit/mL-% otic suspension Place 4 drops into the left ear 3 (three) times daily. for 7 days 10 mL 0    omeprazole (PRILOSEC) 40 MG capsule Take 1 capsule (40 mg total) by mouth once daily. (Patient not taking: Reported on 3/7/2024) 30 capsule 2     No current facility-administered medications for this visit.        Review of Systems   Constitutional:  Negative for fever.   HENT:  Positive for ear pain. Negative for ear discharge.    All other systems reviewed and are negative.         Objective     Physical Exam  Constitutional:       General: She is not in acute distress.     Appearance: Normal appearance. She is not ill-appearing, toxic-appearing or diaphoretic.   HENT:      Head: Normocephalic and atraumatic.      Right Ear: Tympanic membrane normal. No drainage or tenderness. No middle ear effusion. Tympanic membrane is not injected, perforated or erythematous.      Left Ear: Tenderness present. No drainage. A middle ear effusion is present. Tympanic membrane is not injected, perforated or erythematous.      Ears:      Comments: Left ear canal erythematous, with slight purulent appearing discharge.      Nose: Nose normal.      Mouth/Throat:      Mouth: Mucous membranes are moist.      Pharynx: Oropharynx is clear.   Eyes:      Conjunctiva/sclera: Conjunctivae normal.   Cardiovascular:      Rate and Rhythm: Normal rate.   Pulmonary:      Effort: Pulmonary effort is normal.   Abdominal:      General: Abdomen is flat.   Musculoskeletal:         General: Normal range of motion.      Cervical back: Normal range of motion.   Skin:     General: Skin is warm and dry.   Neurological:      General: No focal deficit present.      Mental Status: She is alert and oriented to person, place, and time.   Psychiatric:         Mood and Affect: Mood normal.         Behavior: Behavior normal.          Assessment and Plan     1. Acute otitis externa of left ear, unspecified type; presentation  consistent with AOE, will treat   -     neomycin-polymyxin-hydrocortisone (CORTISPORIN) 3.5-10,000-1 mg/mL-unit/mL-% otic suspension; Place 4 drops into the left ear 3 (three) times daily. for 7 days  Dispense: 10 mL; Refill: 0          Sunday Weldon NP   Internal Medicine           Follow up if symptoms worsen or fail to improve.

## 2024-06-28 ENCOUNTER — OFFICE VISIT (OUTPATIENT)
Dept: INTERNAL MEDICINE | Facility: CLINIC | Age: 49
End: 2024-06-28
Payer: COMMERCIAL

## 2024-06-28 VITALS
DIASTOLIC BLOOD PRESSURE: 72 MMHG | OXYGEN SATURATION: 99 % | BODY MASS INDEX: 23.94 KG/M2 | HEART RATE: 63 BPM | HEIGHT: 67 IN | SYSTOLIC BLOOD PRESSURE: 107 MMHG | WEIGHT: 152.56 LBS

## 2024-06-28 DIAGNOSIS — H60.501 ACUTE OTITIS EXTERNA OF RIGHT EAR, UNSPECIFIED TYPE: ICD-10-CM

## 2024-06-28 DIAGNOSIS — H66.92 LEFT OTITIS MEDIA, UNSPECIFIED OTITIS MEDIA TYPE: Primary | ICD-10-CM

## 2024-06-28 PROCEDURE — 99999 PR PBB SHADOW E&M-EST. PATIENT-LVL III: CPT | Mod: PBBFAC,,, | Performed by: INTERNAL MEDICINE

## 2024-06-28 RX ORDER — AMOXICILLIN AND CLAVULANATE POTASSIUM 875; 125 MG/1; MG/1
1 TABLET, FILM COATED ORAL EVERY 12 HOURS
Qty: 20 TABLET | Refills: 0 | Status: SHIPPED | OUTPATIENT
Start: 2024-06-28 | End: 2024-07-08

## 2024-06-28 RX ORDER — CIPROFLOXACIN AND DEXAMETHASONE 3; 1 MG/ML; MG/ML
4 SUSPENSION/ DROPS AURICULAR (OTIC) 2 TIMES DAILY
Qty: 7.5 ML | Refills: 0 | Status: SHIPPED | OUTPATIENT
Start: 2024-06-28

## 2024-06-28 NOTE — PROGRESS NOTES
Subjective:       Patient ID: Lu Lopez is a 48 y.o. female.    Chief Complaint: Otalgia    Presents for evaluation of left ear pain.  She states for the last few weeks she has been feeling pain in her left ear with pressure.  She feels like she needs to constantly pop her ears.  She feels like there is fluid in her ear.  Denies fevers chills sore throat.  Has not had any loss of hearing or ringing in her ears.    She did come in to urgent care 1 month ago for similar symptoms and was given Cortisporin drops.  Which she used for a week.  She states initially her symptoms improved but in the last couple weeks they have returned and are now worse she believes      Otalgia   Pertinent negatives include no abdominal pain, coughing, diarrhea, headaches or vomiting.     Review of Systems   Constitutional:  Negative for activity change, appetite change and chills.   HENT:  Positive for ear pain. Negative for sinus pressure/congestion and sneezing.    Respiratory:  Negative for cough and shortness of breath.    Cardiovascular:  Negative for chest pain, palpitations and leg swelling.   Gastrointestinal:  Negative for abdominal distention, abdominal pain, constipation, diarrhea, nausea and vomiting.   Genitourinary:  Negative for dysuria and hematuria.   Musculoskeletal:  Negative for arthralgias, back pain and myalgias.   Neurological:  Negative for dizziness and headaches.   Psychiatric/Behavioral:  Negative for agitation. The patient is not nervous/anxious.            Past Medical History:   Diagnosis Date    Endometriosis     Hypertension      Past Surgical History:   Procedure Laterality Date    DILATION AND CURETTAGE OF UTERUS      PELVIC LAPAROSCOPY        Patient Active Problem List   Diagnosis    Anxiety    HTN (hypertension), benign    Insomnia    Migraine with aura and without status migrainosus, not intractable    Endometriosis    Palpitations    Shortness of breath on exertion    Adjustment disorder with  mixed anxiety and depressed mood    PUMA (generalized anxiety disorder)        Objective:      Physical Exam  Constitutional:       Appearance: Normal appearance.   HENT:      Head: Normocephalic.      Left Ear: Swelling present. Tympanic membrane is injected and erythematous.   Cardiovascular:      Rate and Rhythm: Normal rate and regular rhythm.      Pulses: Normal pulses.      Heart sounds: Normal heart sounds.   Pulmonary:      Effort: Pulmonary effort is normal.      Breath sounds: Normal breath sounds.   Abdominal:      General: Abdomen is flat. Bowel sounds are normal.      Palpations: Abdomen is soft.   Musculoskeletal:         General: Normal range of motion.      Cervical back: Normal range of motion and neck supple.   Skin:     General: Skin is warm and dry.   Neurological:      General: No focal deficit present.      Mental Status: She is alert and oriented to person, place, and time.   Psychiatric:         Mood and Affect: Mood normal.         Assessment:       Problem List Items Addressed This Visit    None  Visit Diagnoses       Left otitis media, unspecified otitis media type    -  Primary    Relevant Orders    Ambulatory referral/consult to ENT    Acute otitis externa of right ear, unspecified type                Plan:         Lu was seen today for otalgia.    Diagnoses and all orders for this visit:    Left otitis media, unspecified otitis media type  Acute otitis externa of right ear, unspecified type  -     Ambulatory referral/consult to ENT; Future  -     amoxicillin-clavulanate 875-125mg (AUGMENTIN) 875-125 mg per tablet; Take 1 tablet by mouth every 12 (twelve) hours. for 10 days  -     ciprofloxacin-dexAMETHasone 0.3-0.1% (CIPRODEX) 0.3-0.1 % DrpS; Place 4 drops into both ears 2 (two) times daily.     Start Augmentin and Ciprodex as above.  Advised if she does not improve in the next 2 weeks she should get scheduled with ENT for further evaluation.  Referral has been placed.             Gabriela Ocampo MD   Internal Medicine   Primary Care

## 2024-10-01 DIAGNOSIS — G47.00 INSOMNIA, UNSPECIFIED TYPE: ICD-10-CM

## 2024-10-01 RX ORDER — TRAZODONE HYDROCHLORIDE 150 MG/1
150 TABLET ORAL NIGHTLY PRN
Qty: 90 TABLET | Refills: 1 | Status: SHIPPED | OUTPATIENT
Start: 2024-10-01

## 2024-10-01 NOTE — TELEPHONE ENCOUNTER
No care due was identified.  [facility] Embedded Care Due Messages. Reference number: [id number].   10/01/2024 9:52:36 AM CDT

## 2024-12-03 ENCOUNTER — PATIENT MESSAGE (OUTPATIENT)
Dept: INTERNAL MEDICINE | Facility: CLINIC | Age: 49
End: 2024-12-03
Payer: COMMERCIAL

## 2025-01-07 DIAGNOSIS — R11.0 NAUSEA: ICD-10-CM

## 2025-01-07 RX ORDER — ONDANSETRON 8 MG/1
8 TABLET, ORALLY DISINTEGRATING ORAL EVERY 12 HOURS PRN
Qty: 30 TABLET | Refills: 2 | Status: SHIPPED | OUTPATIENT
Start: 2025-01-07

## 2025-01-07 NOTE — TELEPHONE ENCOUNTER
Refill Routing Note   Medication(s) are not appropriate for processing by [facility] for the following reason(s):        Outside of protocol    [facility] action(s):  Route   Requires labs : Yes               Appointments  past 12m or future 3m with PCP    Date Provider   Last Visit   3/7/2024 Shawnee Cisneros MD   Next Visit   2/11/2025 Shawnee Cisneros MD   ED visits in past 90 days: 0        Note composed:5:10 PM 01/07/2025

## 2025-01-07 NOTE — TELEPHONE ENCOUNTER
Care Due:                  Date            Visit Type   Department     Provider  --------------------------------------------------------------------------------                                EP -                              PRIMARY      [facility] INTERNAL  Last Visit: 03-      CARE ([facility])   MEDICINE       Shawnee Cisneros                              MYCHART                              ANNUAL                              CHECKUP/PHY  [facility] INTERNAL  Next Visit: 02-      S            MEDICINE       Shawnee Cisneros                                                            Last  Test          Frequency    Reason                     Performed    Due Date  --------------------------------------------------------------------------------    CMP.........  12 months..  hydroCHLOROthiazide......  04- 04-    [facility] Embedded Care Due Messages. Reference number: [id number].   1/07/2025 12:23:19 PM CST

## 2025-01-07 NOTE — TELEPHONE ENCOUNTER
Refill Encounter    PCP Visits: Recent Visits  Date Type Provider Dept   03/07/24 Office Visit Shawnee Cisneros MD [facility] Internal Medicine   Showing recent visits within past 360 days and meeting all other requirements  Future Appointments  Date Type Provider Dept   02/11/25 Appointment Shawnee Cisneros MD [facility] Internal Medicine   Showing future appointments within next 720 days and meeting all other requirements     Last 3 Blood Pressure:   BP Readings from Last 3 Encounters:   06/28/24 107/72   05/21/24 122/76   04/12/24 123/77     Preferred Pharmacy:   [facility]/pharm[facility] #[id number] - [place] - [address]  [address]  [place] [address]  Phone: [phone] Fax: [phone]    Requested RX:  Requested Prescriptions     Pending Prescriptions Disp Refills    ondansetron (ZOFRAN-ODT) 8 MG TbDL 30 tablet 2     Sig: Take 1 tablet (8 mg total) by mouth every 12 (twelve) hours as needed (nausea).      RX Route: Normal

## 2025-02-11 ENCOUNTER — LAB VISIT (OUTPATIENT)
Dept: LAB | Facility: OTHER | Age: 50
End: 2025-02-11
Attending: INTERNAL MEDICINE
Payer: COMMERCIAL

## 2025-02-11 ENCOUNTER — OFFICE VISIT (OUTPATIENT)
Dept: INTERNAL MEDICINE | Facility: CLINIC | Age: 50
End: 2025-02-11
Attending: INTERNAL MEDICINE
Payer: COMMERCIAL

## 2025-02-11 VITALS
HEART RATE: 72 BPM | WEIGHT: 153.44 LBS | OXYGEN SATURATION: 100 % | SYSTOLIC BLOOD PRESSURE: 120 MMHG | HEIGHT: 67 IN | DIASTOLIC BLOOD PRESSURE: 72 MMHG | BODY MASS INDEX: 24.08 KG/M2

## 2025-02-11 DIAGNOSIS — R73.01 IFG (IMPAIRED FASTING GLUCOSE): ICD-10-CM

## 2025-02-11 DIAGNOSIS — D64.9 ANEMIA, UNSPECIFIED TYPE: ICD-10-CM

## 2025-02-11 DIAGNOSIS — I10 HYPERTENSION, BENIGN: ICD-10-CM

## 2025-02-11 DIAGNOSIS — Z91.89 AT HIGH RISK FOR BREAST CANCER: ICD-10-CM

## 2025-02-11 DIAGNOSIS — F41.9 ANXIETY: ICD-10-CM

## 2025-02-11 DIAGNOSIS — Z00.00 ANNUAL PHYSICAL EXAM: Primary | ICD-10-CM

## 2025-02-11 DIAGNOSIS — G43.109 MIGRAINE WITH AURA AND WITHOUT STATUS MIGRAINOSUS, NOT INTRACTABLE: ICD-10-CM

## 2025-02-11 DIAGNOSIS — G43.009 MIGRAINE WITHOUT AURA AND WITHOUT STATUS MIGRAINOSUS, NOT INTRACTABLE: ICD-10-CM

## 2025-02-11 DIAGNOSIS — R11.0 NAUSEA: ICD-10-CM

## 2025-02-11 DIAGNOSIS — F43.23 ADJUSTMENT DISORDER WITH MIXED ANXIETY AND DEPRESSED MOOD: ICD-10-CM

## 2025-02-11 DIAGNOSIS — G47.00 INSOMNIA, UNSPECIFIED TYPE: ICD-10-CM

## 2025-02-11 DIAGNOSIS — Z00.00 ANNUAL PHYSICAL EXAM: ICD-10-CM

## 2025-02-11 LAB
ALBUMIN SERPL BCP-MCNC: 4.5 G/DL (ref 3.5–5.2)
ALP SERPL-CCNC: 40 U/L (ref 40–150)
ALT SERPL W/O P-5'-P-CCNC: 16 U/L (ref 10–44)
ANION GAP SERPL CALC-SCNC: 11 MMOL/L (ref 8–16)
AST SERPL-CCNC: 18 U/L (ref 10–40)
BILIRUB SERPL-MCNC: 0.6 MG/DL (ref 0.1–1)
BUN SERPL-MCNC: 18 MG/DL (ref 6–20)
CALCIUM SERPL-MCNC: 9.6 MG/DL (ref 8.7–10.5)
CHLORIDE SERPL-SCNC: 105 MMOL/L (ref 95–110)
CHOLEST SERPL-MCNC: 181 MG/DL (ref 120–199)
CHOLEST/HDLC SERPL: 3.5 {RATIO} (ref 2–5)
CO2 SERPL-SCNC: 23 MMOL/L (ref 23–29)
CREAT SERPL-MCNC: 0.8 MG/DL (ref 0.5–1.4)
EST. GFR  (NO RACE VARIABLE): >60 ML/MIN/1.73 M^2
ESTIMATED AVG GLUCOSE: 97 MG/DL (ref 68–131)
FERRITIN SERPL-MCNC: 100 NG/ML (ref 20–300)
FOLATE SERPL-MCNC: 10.2 NG/ML (ref 4–24)
GLUCOSE SERPL-MCNC: 94 MG/DL (ref 70–110)
HBA1C MFR BLD: 5 % (ref 4–5.6)
HDLC SERPL-MCNC: 52 MG/DL (ref 40–75)
HDLC SERPL: 28.7 % (ref 20–50)
IRON SERPL-MCNC: 247 UG/DL (ref 30–160)
LDLC SERPL CALC-MCNC: 107.8 MG/DL (ref 63–159)
NONHDLC SERPL-MCNC: 129 MG/DL
POTASSIUM SERPL-SCNC: 3.8 MMOL/L (ref 3.5–5.1)
PROT SERPL-MCNC: 7.8 G/DL (ref 6–8.4)
SATURATED IRON: 72 % (ref 20–50)
SODIUM SERPL-SCNC: 139 MMOL/L (ref 136–145)
TOTAL IRON BINDING CAPACITY: 343 UG/DL (ref 250–450)
TRANSFERRIN SERPL-MCNC: 232 MG/DL (ref 200–375)
TRIGL SERPL-MCNC: 106 MG/DL (ref 30–150)
TSH SERPL DL<=0.005 MIU/L-ACNC: 1.5 UIU/ML (ref 0.4–4)
VIT B12 SERPL-MCNC: 427 PG/ML (ref 210–950)

## 2025-02-11 PROCEDURE — 82607 VITAMIN B-12: CPT | Performed by: INTERNAL MEDICINE

## 2025-02-11 PROCEDURE — 36415 COLL VENOUS BLD VENIPUNCTURE: CPT | Performed by: INTERNAL MEDICINE

## 2025-02-11 PROCEDURE — 3044F HG A1C LEVEL LT 7.0%: CPT | Mod: CPTII,S$GLB,, | Performed by: INTERNAL MEDICINE

## 2025-02-11 PROCEDURE — 82728 ASSAY OF FERRITIN: CPT | Performed by: INTERNAL MEDICINE

## 2025-02-11 PROCEDURE — 1159F MED LIST DOCD IN RCRD: CPT | Mod: CPTII,S$GLB,, | Performed by: INTERNAL MEDICINE

## 2025-02-11 PROCEDURE — 84443 ASSAY THYROID STIM HORMONE: CPT | Performed by: INTERNAL MEDICINE

## 2025-02-11 PROCEDURE — 83540 ASSAY OF IRON: CPT | Performed by: INTERNAL MEDICINE

## 2025-02-11 PROCEDURE — 1160F RVW MEDS BY RX/DR IN RCRD: CPT | Mod: CPTII,S$GLB,, | Performed by: INTERNAL MEDICINE

## 2025-02-11 PROCEDURE — 80053 COMPREHEN METABOLIC PANEL: CPT | Performed by: INTERNAL MEDICINE

## 2025-02-11 PROCEDURE — 3078F DIAST BP <80 MM HG: CPT | Mod: CPTII,S$GLB,, | Performed by: INTERNAL MEDICINE

## 2025-02-11 PROCEDURE — 80061 LIPID PANEL: CPT | Performed by: INTERNAL MEDICINE

## 2025-02-11 PROCEDURE — 82746 ASSAY OF FOLIC ACID SERUM: CPT | Performed by: INTERNAL MEDICINE

## 2025-02-11 PROCEDURE — 3008F BODY MASS INDEX DOCD: CPT | Mod: CPTII,S$GLB,, | Performed by: INTERNAL MEDICINE

## 2025-02-11 PROCEDURE — 83036 HEMOGLOBIN GLYCOSYLATED A1C: CPT | Performed by: INTERNAL MEDICINE

## 2025-02-11 PROCEDURE — 3074F SYST BP LT 130 MM HG: CPT | Mod: CPTII,S$GLB,, | Performed by: INTERNAL MEDICINE

## 2025-02-11 RX ORDER — TRAZODONE HYDROCHLORIDE 150 MG/1
150 TABLET ORAL NIGHTLY PRN
Qty: 90 TABLET | Refills: 3 | Status: SHIPPED | OUTPATIENT
Start: 2025-02-11

## 2025-02-11 RX ORDER — SERTRALINE HYDROCHLORIDE 100 MG/1
100 TABLET, FILM COATED ORAL DAILY
Qty: 90 TABLET | Refills: 3 | Status: SHIPPED | OUTPATIENT
Start: 2025-02-11 | End: 2026-02-11

## 2025-02-11 RX ORDER — HYDROCHLOROTHIAZIDE 12.5 MG/1
12.5 TABLET ORAL DAILY
Qty: 90 TABLET | Refills: 3 | Status: SHIPPED | OUTPATIENT
Start: 2025-02-11

## 2025-02-11 RX ORDER — SUMATRIPTAN SUCCINATE 100 MG/1
TABLET ORAL
Qty: 18 TABLET | Refills: 11 | Status: SHIPPED | OUTPATIENT
Start: 2025-02-11

## 2025-02-11 RX ORDER — ONDANSETRON 8 MG/1
8 TABLET, ORALLY DISINTEGRATING ORAL EVERY 12 HOURS PRN
Qty: 30 TABLET | Refills: 2 | Status: SHIPPED | OUTPATIENT
Start: 2025-02-11

## 2025-02-11 NOTE — PROGRESS NOTES
Subjective:   Patient ID: Lu Lopez is a 49 y.o. female  Chief complaint:   Chief Complaint   Patient presents with    Annual Exam     HPI    Here for annual exam   Exercising consistently     Seeing gyn this month   Gained 10 pounds   On ocps - having bt bleeding and will trigger migraines    New job 5 months ago   At times will wake up up during sleep   Vivid dreams - not waking gasping for air   Still grief counseling  No snoring   Prev:   Mom passed away last year and friend recently passed as well   Has good fam support  Poor sleep  Trazodone can be helpful  Watching tv before bed   Exercising and yoga  At [facility]:   Overwhelmed at times - Everything feels harder   Exercising 3 days per week - cardio and yoga   Feel in a rut  Mom not doing well medically - she is her caregiver   Not in counseling   Daughter is on zoloft and was on prozac for 3 years and was not helping   - no si/hi/snider  Anxiety:  - too flat on lexapro in past - manageable at this time     Migraines:   Trigger is poor sleep and period  + nausea with this at times - zofran helpful prn   Prev:   - improved after switching ocps in past  - seen by neuro and now on elavil 50mg and will only get migraine if very stressed or after a few days of poor sleep   - migraines are mild at this time     HTN:   - controlled today   - bianca med   Previously:   - isolated elevated reading in Jan was when dad used cuff  - readings ok - 2 lower readings upon review  - bp at goal   - in dig htn program  - bianca med on med card     Vit d def:  - not c/w otc vit d suppl    Abn liver us:   Seen by hep 2/2/203  To have MRI and due for f/u     High risk for breast cancer:   - mri at [facility] in 2023 and then mri at [facility] 2024 - had bx and unremarkable  Prev:  - MRI breast to be arranged at [facility]  Studies ordered through gyn at [facility]  Prev:   mmg ordered through gyn at [facility] 6/2021 and breast MRI 12/2021 - [facility]    Insomnia:  - has periods when sx wax and wane - trazodone  "helpful prn  Previously:   Taking elavil 25mg and not helpful for sleep   Tried nyquil, benadryl, melatonin and ineffective   - exercising as above     Jennifer:   Resolved  Prev:   Mild - stopped oral iron at end of last year due to constipation even with colace   - periods intermittent - spotting mild     HM:  Covid booster   Flu vaccine     Gyn: Meera   Hep:     Review of Systems    Objective:  Vitals:    02/11/25 0739   BP: 120/72   BP Location: Left arm   Patient Position: Sitting   Pulse: 72   SpO2: 100%   Weight: 69.6 kg (153 lb 7 oz)   Height: 5' 7" (1.702 m)       Body mass index is 24.03 kg/m².    Physical Exam  Vitals reviewed.   Constitutional:       Appearance: Normal appearance. She is well-developed.   HENT:      Head: Normocephalic and atraumatic.      Right Ear: Tympanic membrane, ear canal and external ear normal.      Left Ear: Tympanic membrane, ear canal and external ear normal.      Nose: no congestion present.      Mouth/Throat:      Mouth: Mucous membranes are moist.      Pharynx: Oropharynx is clear. No oropharyngeal exudate.   Eyes:      Extraocular Movements: Extraocular movements intact.      Conjunctiva/sclera: Conjunctivae normal.   Neck:      Thyroid: No thyromegaly.   Cardiovascular:      Rate and Rhythm: Normal rate and regular rhythm.      Pulses: Normal pulses.      Heart sounds: Normal heart sounds.   Pulmonary:      Effort: Pulmonary effort is normal.      Breath sounds: Normal breath sounds.   Abdominal:      General: Bowel sounds are normal.      Palpations: Abdomen is soft.   Musculoskeletal:         General: No swelling or tenderness.      Cervical back: Neck supple.   Lymphadenopathy:      Cervical: No cervical adenopathy.   Skin:     General: Skin is warm and dry.      Capillary Refill: Capillary refill takes less than 2 seconds.   Neurological:      General: No focal deficit present.      Mental Status: She is alert and oriented to person, place, and time. Mental status is at " baseline.   Psychiatric:         Behavior: Behavior normal.         Thought Content: Thought content normal.    1. Annual physical exam    2. IFG (impaired fasting glucose)    3. Anemia, unspecified type    4. Insomnia, unspecified type    5. Anxiety    6. Hypertension, benign    7. Migraine without aura and without status migrainosus, not intractable    8. Nausea    9. Migraine with aura and without status migrainosus, not intractable    10. Adjustment disorder with mixed anxiety and depressed mood    11. At high risk for breast cancer      Plan:  Lu was seen today for annual exam.    Diagnoses and all orders for this visit:    Annual physical exam  -     Comprehensive Metabolic Panel; Future  -     TSH; Future  -     Hemoglobin A1C; Future  -     Lipid Panel; Future  -     Vitamin B12; Future  -     FOLATE; Future  -     Cancel: IRON AND TIBC; Future  -     Cancel: FERRITIN; Future  -     FERRITIN; Future  -     IRON AND TIBC; Future    IFG (impaired fasting glucose)  -     Hemoglobin A1C; Future    Anemia, unspecified type  -     Vitamin B12; Future  -     FOLATE; Future  -     Cancel: IRON AND TIBC; Future  -     Cancel: FERRITIN; Future  -     FERRITIN; Future  -     IRON AND TIBC; Future    Insomnia, unspecified type  -     traZODone (DESYREL) 150 MG tablet; Take 1 tablet (150 mg total) by mouth nightly as needed for Insomnia.    Anxiety  -     sertraline (ZOLOFT) 100 MG tablet; Take 1 tablet (100 mg total) by mouth once daily.    Hypertension, benign  -     hydroCHLOROthiazide 12.5 MG Tab; Take 1 tablet (12.5 mg total) by mouth once daily.    Migraine without aura and without status migrainosus, not intractable  -     sumatriptan (IMITREX) 100 MG tablet; Do not exceed max 200mg/24h    Nausea  -     ondansetron (ZOFRAN-ODT) 8 MG TbDL; Take 1 tablet (8 mg total) by mouth every 12 (twelve) hours as needed (nausea).    Migraine with aura and without status migrainosus, not intractable    Adjustment disorder  with mixed anxiety and depressed mood    At high risk for breast cancer        Today:   Recommend daily sunscreen, cardiovascular exercise min 30 min 5 days per week. Seatbelts routinely.  Reviewed recommended health maintenance and recommended vaccines   Check/reviewed appropriate labs     Cont current meds   Review nad suppl for skin  Labs today - coffee + splenda   Fu with hep for mri     Health Maintenance   Topic Date Due    Influenza Vaccine (1) 09/01/2024    COVID-19 Vaccine (4 - 2024-25 season) 09/01/2024    Mammogram  10/22/2025    Hemoglobin A1c (Prediabetes)  02/11/2026    TETANUS VACCINE  01/02/2029    Cervical Cancer Screening  02/14/2029    Lipid Panel  02/11/2030    Colorectal Cancer Screening  03/25/2032    RSV Vaccine (Age 60+ and Pregnant patients) (1 - 1-dose 75+ series) 07/02/2051    Hepatitis C Screening  Completed    HIV Screening  Completed    Pneumococcal Vaccines (Age 0-49)  Aged Out

## 2025-02-16 ENCOUNTER — RESULTS FOLLOW-UP (OUTPATIENT)
Dept: INTERNAL MEDICINE | Facility: CLINIC | Age: 50
End: 2025-02-16

## 2025-02-16 PROBLEM — Z91.89 AT HIGH RISK FOR BREAST CANCER: Status: ACTIVE | Noted: 2025-02-16

## 2025-02-16 PROBLEM — G43.009 MIGRAINE WITHOUT AURA AND WITHOUT STATUS MIGRAINOSUS, NOT INTRACTABLE: Status: ACTIVE | Noted: 2025-02-16

## 2025-03-26 ENCOUNTER — OFFICE VISIT (OUTPATIENT)
Dept: INTERNAL MEDICINE | Facility: CLINIC | Age: 50
End: 2025-03-26
Payer: COMMERCIAL

## 2025-03-26 VITALS
DIASTOLIC BLOOD PRESSURE: 74 MMHG | HEART RATE: 70 BPM | TEMPERATURE: 98 F | BODY MASS INDEX: 24.15 KG/M2 | WEIGHT: 153.88 LBS | SYSTOLIC BLOOD PRESSURE: 120 MMHG | RESPIRATION RATE: 13 BRPM | HEIGHT: 67 IN | OXYGEN SATURATION: 98 %

## 2025-03-26 DIAGNOSIS — R50.9 FEVER: ICD-10-CM

## 2025-03-26 DIAGNOSIS — J01.40 ACUTE NON-RECURRENT PANSINUSITIS: Primary | ICD-10-CM

## 2025-03-26 DIAGNOSIS — R05.9 COUGH: ICD-10-CM

## 2025-03-26 DIAGNOSIS — Z20.822 ENCOUNTER FOR LABORATORY TESTING FOR COVID-19 VIRUS: ICD-10-CM

## 2025-03-26 LAB
INFLUENZA A MOLECULAR (OHS): NEGATIVE
INFLUENZA B MOLECULAR (OHS): NEGATIVE
SARS-COV-2 RNA RESP QL NAA+PROBE: NEGATIVE

## 2025-03-26 PROCEDURE — 3078F DIAST BP <80 MM HG: CPT | Mod: CPTII,S$GLB,, | Performed by: PHYSICIAN ASSISTANT

## 2025-03-26 PROCEDURE — 99999 PR PBB SHADOW E&M-EST. PATIENT-LVL IV: CPT | Mod: PBBFAC,,, | Performed by: PHYSICIAN ASSISTANT

## 2025-03-26 PROCEDURE — 1160F RVW MEDS BY RX/DR IN RCRD: CPT | Mod: CPTII,S$GLB,, | Performed by: PHYSICIAN ASSISTANT

## 2025-03-26 PROCEDURE — 99214 OFFICE O/P EST MOD 30 MIN: CPT | Mod: S$GLB,,, | Performed by: PHYSICIAN ASSISTANT

## 2025-03-26 PROCEDURE — 87502 INFLUENZA DNA AMP PROBE: CPT | Performed by: PHYSICIAN ASSISTANT

## 2025-03-26 PROCEDURE — 3044F HG A1C LEVEL LT 7.0%: CPT | Mod: CPTII,S$GLB,, | Performed by: PHYSICIAN ASSISTANT

## 2025-03-26 PROCEDURE — 1159F MED LIST DOCD IN RCRD: CPT | Mod: CPTII,S$GLB,, | Performed by: PHYSICIAN ASSISTANT

## 2025-03-26 PROCEDURE — 3074F SYST BP LT 130 MM HG: CPT | Mod: CPTII,S$GLB,, | Performed by: PHYSICIAN ASSISTANT

## 2025-03-26 PROCEDURE — 3008F BODY MASS INDEX DOCD: CPT | Mod: CPTII,S$GLB,, | Performed by: PHYSICIAN ASSISTANT

## 2025-03-26 PROCEDURE — 87635 SARS-COV-2 COVID-19 AMP PRB: CPT | Performed by: PHYSICIAN ASSISTANT

## 2025-03-26 RX ORDER — BENZONATATE 100 MG/1
100 CAPSULE ORAL 3 TIMES DAILY PRN
Qty: 21 CAPSULE | Refills: 0 | Status: SHIPPED | OUTPATIENT
Start: 2025-03-26

## 2025-03-26 NOTE — PROGRESS NOTES
[facility] Note    Chief Complaint      Chief Complaint   Patient presents with    Nasal Congestion    Cough    Sinus Problem       History of Present Illness      Ms. Lu Lopez is a 49 y.o. female patient of Dr. Cisneros who presents today for sinus congestion and cough.     Onset: 5 days ago  Exposure: None  No other family members with symptoms  Symptoms: Started with sore throat; gradually worsened to include low grade fever, minimal body aches, headaches, sinus congestion, aural fullness, coughing.   Home therapies include: mucinex / sudafed, sinex nasal spray, daily xyzal  Vaccines: Did not receive COVID or flu vaccine      Past Medical History:  Past Medical History:   Diagnosis Date    Endometriosis     Hypertension        Past Surgical History:  Past Surgical History:   Procedure Laterality Date    DILATION AND CURETTAGE OF UTERUS      PELVIC LAPAROSCOPY         Family History:  family history includes Alcohol abuse in her mother; Depression in her daughter and mother; Diabetes in her daughter; Fibroids in her mother; Heart disease in her father; Hyperlipidemia in her father; Hypertension in her father and mother; No Known Problems in her brother, daughter, daughter, sister, and sister.     Social History:  Social History[1]     Medications:  Encounter Medications[2]    Allergies:  Review of patient's allergies indicates:  No Known Allergies    Health Maintenance:  Health Maintenance   Topic Date Due    Influenza Vaccine (1) 09/01/2024    COVID-19 Vaccine (4 - 2024-25 season) 09/01/2024    Mammogram  10/22/2025    Hemoglobin A1c (Prediabetes)  02/11/2026    TETANUS VACCINE  01/02/2029    Cervical Cancer Screening  02/14/2029    Lipid Panel  02/11/2030    Colorectal Cancer Screening  03/25/2032    RSV Vaccine (Age 60+ and Pregnant patients) (1 - 1-dose 75+ series) 07/02/2051    Hepatitis C Screening  Completed    HIV Screening  Completed    Pneumococcal Vaccines (Age 0-49)  Aged  "Out     Health Maintenance Topics with due status: Not Due       Topic Last Completion Date    TETANUS VACCINE 01/02/2019    Colorectal Cancer Screening 03/25/2022    Cervical Cancer Screening 02/14/2024    Mammogram 10/22/2024    Hemoglobin A1c (Prediabetes) 02/11/2025    Lipid Panel 02/11/2025    RSV Vaccine (Age 60+ and Pregnant patients) Not Due       Review of Systems:  Review of Systems   Constitutional:  Positive for fever and malaise/fatigue. Negative for chills.   HENT:  Positive for congestion, ear pain, sinus pain and sore throat. Negative for hearing loss.    Respiratory:  Positive for cough. Negative for sputum production, shortness of breath and wheezing.    Cardiovascular:  Negative for chest pain and palpitations.   Gastrointestinal:  Negative for abdominal pain, constipation, diarrhea, heartburn, nausea and vomiting.   Musculoskeletal:  Positive for myalgias.   Neurological:  Positive for headaches. Negative for dizziness and weakness.       Physical Exam      Vital Signs  Temp: 98.2 °F (36.8 °C)  Temp Source: Temporal  Pulse: 70  Resp: 13  SpO2: 98 %  BP: 120/74  BP Location: Left arm  Patient Position: Sitting  Pain Score: 0-No pain  Height and Weight  Height: 5' 7" (170.2 cm)  Weight: 69.8 kg (153 lb 14.1 oz)  BSA (Calculated - sq m): 1.82 sq meters  BMI (Calculated): 24.1  Weight in (lb) to have BMI = 25: 159.3]    Physical Exam  Constitutional:       Appearance: Normal appearance.   HENT:      Right Ear: Tympanic membrane, ear canal and external ear normal. There is no impacted cerumen.      Left Ear: Tympanic membrane, ear canal and external ear normal. There is no impacted cerumen.      Nose: Congestion present.      Mouth/Throat:      Mouth: Mucous membranes are moist.      Pharynx: Posterior oropharyngeal erythema present. No oropharyngeal exudate.   Eyes:      Extraocular Movements: Extraocular movements intact.      Pupils: Pupils are equal, round, and reactive to light.   Cardiovascular: "      Rate and Rhythm: Normal rate and regular rhythm.      Heart sounds: Normal heart sounds.   Pulmonary:      Effort: Pulmonary effort is normal.      Breath sounds: Normal breath sounds.   Musculoskeletal:      Cervical back: No tenderness.   Lymphadenopathy:      Cervical: Cervical adenopathy present.   Skin:     General: Skin is warm and dry.   Neurological:      General: No focal deficit present.      Mental Status: She is alert.   Psychiatric:         Mood and Affect: Mood normal.          Laboratory:  CBC:  Recent Labs   Lab 10/02/23  1326 03/07/24  0830 04/12/24  1147   WBC 8.41 5.26 8.76   RBC 4.17 4.43 4.40   Hemoglobin 11.7 L 12.5 12.3   Hematocrit 35.9 L 38.5 38.0   Platelets 289 286 314   MCV 86 87 86   MCH 28.1 28.2 28.0   MCHC 32.6 32.5 32.4     CMP:  Recent Labs   Lab 03/07/24  0830 04/12/24  1147 02/11/25  0816   Glucose 91 99 94   Calcium 9.3 9.7 9.6   Albumin 4.4 4.4 4.5   Total Protein 7.5 7.6 7.8   Sodium 137 138 139   Potassium 4.0 3.7 3.8   CO2 25 22 L 23   Chloride 105 104 105   BUN 13 15 18   Alkaline Phosphatase 42 L 36 L 40   ALT 14 12 16   AST 18 19 18   Total Bilirubin 0.4 0.4 0.6     URINALYSIS:  Recent Labs   Lab 03/14/24  0711   Color, UA Yellow   Specific Gravity, UA 1.030   pH, UA 6.0   Protein, UA 1+ A   Bacteria Occasional   Nitrite, UA Negative   Leukocytes, UA Negative   Urobilinogen, UA Negative   Hyaline Casts, UA 1      LIPIDS:  Recent Labs   Lab 04/21/23  0940 10/02/23  1326 03/07/24  0830 02/11/25  0816   TSH 1.024 0.614 1.162 1.501   HDL 70  --  56 52   Cholesterol 185  --  184 181   Triglycerides 46  --  82 106   LDL Cholesterol 105.8  --  111.6 107.8   HDL/Cholesterol Ratio 37.8  --  30.4 28.7   Non-HDL Cholesterol 115  --  128 129   Total Cholesterol/HDL Ratio 2.6  --  3.3 3.5     TSH:  Recent Labs   Lab 10/02/23  1326 03/07/24  0830 02/11/25  0816   TSH 0.614 1.162 1.501     A1C:  Recent Labs   Lab 04/21/23  0940 03/07/24  0830 02/11/25  0816   Hemoglobin A1C 5.1 5.3  5.0       Radiology:        Assessment/Plan     Lu Lopez is a 49 y.o.female with:    1. Acute non-recurrent pansinusitis    2. Cough  -     COVID-19 Routine Screening  -     Influenza A & B by Molecular  -     benzonatate (TESSALON) 100 MG capsule; Take 1 capsule (100 mg total) by mouth 3 (three) times daily as needed for Cough.  Dispense: 21 capsule; Refill: 0    3. Fever  -     COVID-19 Routine Screening  -     Influenza A & B by Molecular    4. Encounter for laboratory testing for COVID-19 virus  -     COVID-19 Routine Screening      Plan:   - Assessed and evaluated worsening sinusitis with sore throat and cough, without any improvement from over-the-counter therapies.  - Swabbed for COVID and flu. Both are negative.   - Recommended supportive care - Drink plenty of fluids, rest, tylenol for pain and fever, OTC decongestants (Mucinex), flonase and saline nasal spray to irrigate sinuses; can do warm salt water gargles, lozenges, or warm tea with honey to help with sore throat.  - Sent prescription for Tessalon Perles to help with cough and augmentin to clear up sinusitis - Discussed medication indication, how/when to use, benefits and adverse effects of medication. After discussion, patient would like to continue with prescribed regimen.     RTC if symptoms do not improve in 1 week.    Nicole Morcos, PA-C [facility]         [1]   Social History  Socioeconomic History    Marital status:    Occupational History    Occupation: fundraising for diabetes    Tobacco Use    Smoking status: Former     Current packs/day: 0.00     Types: Cigarettes     Quit date: 10/20/1995     Years since quittin.4     Passive exposure: Past    Smokeless tobacco: Never    Tobacco comments:     social tob in college x couple of years   Substance and Sexual Activity    Alcohol use: Yes     Alcohol/week: 3.0 - 4.0 standard drinks of alcohol     Types: 3 - 4 Drinks containing 0.5 oz of alcohol per week      Comment: socially    Drug use: No    Sexual activity: Yes     Partners: Male     Comment: Birth Control Pills     Social Drivers of Health     Financial Resource Strain: Low Risk  (3/25/2025)    Overall Financial Resource Strain (CARDIA)     Difficulty of Paying Living Expenses: Not hard at all   Food Insecurity: No Food Insecurity (3/25/2025)    Hunger Vital Sign     Worried About Running Out of Food in the Last Year: Never true     Ran Out of Food in the Last Year: Never true   Transportation Needs: No Transportation Needs (3/25/2025)    PRAPARE - Transportation     Lack of Transportation (Medical): No     Lack of Transportation (Non-Medical): No   Physical Activity: Sufficiently Active (3/25/2025)    Exercise Vital Sign     Days of Exercise per Week: 3 days     Minutes of Exercise per Session: 60 min   Stress: Stress Concern Present (3/25/2025)    [employer] - Occupational Stress Questionnaire     Feeling of Stress : To some extent   Housing Stability: Low Risk  (3/25/2025)    Housing Stability Vital Sign     Unable to Pay for Housing in the Last Year: No     Homeless in the Last Year: No   [2]   Outpatient Encounter Medications as of 3/26/2025   Medication Sig Dispense Refill    hydroCHLOROthiazide 12.5 MG Tab Take 1 tablet (12.5 mg total) by mouth once daily. 90 tablet 3    norethindrone (MICRONOR) 0.35 mg tablet       ondansetron (ZOFRAN-ODT) 8 MG TbDL Take 1 tablet (8 mg total) by mouth every 12 (twelve) hours as needed (nausea). 30 tablet 2    sertraline (ZOLOFT) 100 MG tablet Take 1 tablet (100 mg total) by mouth once daily. 90 tablet 3    sumatriptan (IMITREX) 100 MG tablet Do not exceed max 200mg/24h 18 tablet 11    traZODone (DESYREL) 150 MG tablet Take 1 tablet (150 mg total) by mouth nightly as needed for Insomnia. 90 tablet 3    benzonatate (TESSALON) 100 MG capsule Take 1 capsule (100 mg total) by mouth 3 (three) times daily as needed for Cough. 21 capsule 0     norethindrone (MICRONOR) 0.35 mg tablet Take 1 tablet by mouth once daily.       No facility-administered encounter medications on file as of 3/26/2025.

## 2025-03-27 ENCOUNTER — RESULTS FOLLOW-UP (OUTPATIENT)
Dept: INTERNAL MEDICINE | Facility: CLINIC | Age: 50
End: 2025-03-27

## 2025-03-27 RX ORDER — AMOXICILLIN AND CLAVULANATE POTASSIUM 875; 125 MG/1; MG/1
1 TABLET, FILM COATED ORAL EVERY 12 HOURS
Qty: 14 TABLET | Refills: 0 | Status: SHIPPED | OUTPATIENT
Start: 2025-03-27 | End: 2025-04-03

## 2025-06-09 ENCOUNTER — PATIENT MESSAGE (OUTPATIENT)
Dept: INTERNAL MEDICINE | Facility: CLINIC | Age: 50
End: 2025-06-09
Payer: COMMERCIAL

## 2025-06-09 DIAGNOSIS — G43.009 MIGRAINE WITHOUT AURA AND WITHOUT STATUS MIGRAINOSUS, NOT INTRACTABLE: ICD-10-CM

## 2025-06-09 RX ORDER — SUMATRIPTAN SUCCINATE 100 MG/1
TABLET ORAL
Qty: 18 TABLET | Refills: 11 | Status: SHIPPED | OUTPATIENT
Start: 2025-06-09

## 2025-06-09 NOTE — PROGRESS NOTES
Subjective:   Patient ID: Lu Lopez is a 49 y.o. female  Chief complaint: No chief complaint on file.      HPI    Review of Systems    Objective:  There were no vitals filed for this visit.  There is no height or weight on file to calculate BMI.    Physical Exam    Assessment:  1. Migraine without aura and without status migrainosus, not intractable        Plan:  1. Migraine without aura and without status migrainosus, not intractable          Health Maintenance   Topic Date Due    COVID-19 Vaccine (4 - 2024-25 season) 09/01/2024    Influenza Vaccine (Season Ended) 09/01/2025    Mammogram  10/22/2025    Hemoglobin A1c (Prediabetes)  02/11/2026    TETANUS VACCINE  01/02/2029    Cervical Cancer Screening  02/14/2029    Lipid Panel  02/11/2030    Colorectal Cancer Screening  03/25/2032    RSV Vaccine (Age 60+ and Pregnant patients) (1 - 1-dose 75+ series) 07/02/2051    Hepatitis C Screening  Completed    HIV Screening  Completed    Pneumococcal Vaccines (Age 0-49)  Aged Out

## 2025-06-10 ENCOUNTER — TELEPHONE (OUTPATIENT)
Dept: INTERNAL MEDICINE | Facility: CLINIC | Age: 50
End: 2025-06-10
Payer: COMMERCIAL